# Patient Record
Sex: MALE | Race: AMERICAN INDIAN OR ALASKA NATIVE | URBAN - METROPOLITAN AREA
[De-identification: names, ages, dates, MRNs, and addresses within clinical notes are randomized per-mention and may not be internally consistent; named-entity substitution may affect disease eponyms.]

---

## 2017-06-29 ENCOUNTER — EMERGENCY (EMERGENCY)
Facility: HOSPITAL | Age: 57
LOS: 0 days | Discharge: HOME | End: 2017-06-29

## 2017-06-29 DIAGNOSIS — E11.9 TYPE 2 DIABETES MELLITUS WITHOUT COMPLICATIONS: ICD-10-CM

## 2017-06-29 DIAGNOSIS — Z79.82 LONG TERM (CURRENT) USE OF ASPIRIN: ICD-10-CM

## 2017-06-29 DIAGNOSIS — M62.830 MUSCLE SPASM OF BACK: ICD-10-CM

## 2017-06-29 DIAGNOSIS — M54.41 LUMBAGO WITH SCIATICA, RIGHT SIDE: ICD-10-CM

## 2017-06-29 DIAGNOSIS — I11.0 HYPERTENSIVE HEART DISEASE WITH HEART FAILURE: ICD-10-CM

## 2017-06-29 DIAGNOSIS — Z95.5 PRESENCE OF CORONARY ANGIOPLASTY IMPLANT AND GRAFT: ICD-10-CM

## 2017-06-29 DIAGNOSIS — I51.9 HEART DISEASE, UNSPECIFIED: ICD-10-CM

## 2017-11-16 ENCOUNTER — OUTPATIENT (OUTPATIENT)
Dept: OUTPATIENT SERVICES | Facility: HOSPITAL | Age: 57
LOS: 1 days | Discharge: HOME | End: 2017-11-16

## 2017-11-16 DIAGNOSIS — I25.10 ATHEROSCLEROTIC HEART DISEASE OF NATIVE CORONARY ARTERY WITHOUT ANGINA PECTORIS: ICD-10-CM

## 2017-11-16 DIAGNOSIS — E03.9 HYPOTHYROIDISM, UNSPECIFIED: ICD-10-CM

## 2017-11-16 DIAGNOSIS — Z01.810 ENCOUNTER FOR PREPROCEDURAL CARDIOVASCULAR EXAMINATION: ICD-10-CM

## 2017-11-16 DIAGNOSIS — E78.00 PURE HYPERCHOLESTEROLEMIA, UNSPECIFIED: ICD-10-CM

## 2017-11-16 DIAGNOSIS — E11.9 TYPE 2 DIABETES MELLITUS WITHOUT COMPLICATIONS: ICD-10-CM

## 2020-02-21 ENCOUNTER — APPOINTMENT (OUTPATIENT)
Dept: CARDIOLOGY | Facility: CLINIC | Age: 60
End: 2020-02-21
Payer: COMMERCIAL

## 2020-02-21 PROCEDURE — 93000 ELECTROCARDIOGRAM COMPLETE: CPT

## 2020-02-21 PROCEDURE — 99214 OFFICE O/P EST MOD 30 MIN: CPT

## 2020-02-24 ENCOUNTER — APPOINTMENT (OUTPATIENT)
Dept: CARDIOLOGY | Facility: CLINIC | Age: 60
End: 2020-02-24

## 2020-03-04 ENCOUNTER — OUTPATIENT (OUTPATIENT)
Dept: OUTPATIENT SERVICES | Facility: HOSPITAL | Age: 60
LOS: 1 days | Discharge: HOME | End: 2020-03-04
Payer: COMMERCIAL

## 2020-03-04 DIAGNOSIS — R07.9 CHEST PAIN, UNSPECIFIED: ICD-10-CM

## 2020-03-04 PROCEDURE — 78452 HT MUSCLE IMAGE SPECT MULT: CPT | Mod: 26

## 2020-03-10 ENCOUNTER — APPOINTMENT (OUTPATIENT)
Dept: CARDIOLOGY | Facility: CLINIC | Age: 60
End: 2020-03-10
Payer: COMMERCIAL

## 2020-03-10 PROCEDURE — 99214 OFFICE O/P EST MOD 30 MIN: CPT

## 2020-03-10 PROCEDURE — 93000 ELECTROCARDIOGRAM COMPLETE: CPT

## 2020-03-11 ENCOUNTER — APPOINTMENT (OUTPATIENT)
Dept: CARDIOLOGY | Facility: CLINIC | Age: 60
End: 2020-03-11
Payer: COMMERCIAL

## 2020-03-11 PROCEDURE — 93306 TTE W/DOPPLER COMPLETE: CPT

## 2020-03-13 ENCOUNTER — APPOINTMENT (OUTPATIENT)
Dept: CARDIOLOGY | Facility: CLINIC | Age: 60
End: 2020-03-13

## 2020-03-16 ENCOUNTER — INPATIENT (INPATIENT)
Facility: HOSPITAL | Age: 60
LOS: 6 days | Discharge: HOME HEALTH | End: 2020-03-23
Attending: THORACIC SURGERY (CARDIOTHORACIC VASCULAR SURGERY) | Admitting: THORACIC SURGERY (CARDIOTHORACIC VASCULAR SURGERY)
Payer: COMMERCIAL

## 2020-03-16 ENCOUNTER — APPOINTMENT (OUTPATIENT)
Dept: CARDIOLOGY | Facility: CLINIC | Age: 60
End: 2020-03-16

## 2020-03-16 VITALS
OXYGEN SATURATION: 98 % | TEMPERATURE: 97 F | RESPIRATION RATE: 20 BRPM | HEIGHT: 70 IN | WEIGHT: 182.1 LBS | DIASTOLIC BLOOD PRESSURE: 88 MMHG | SYSTOLIC BLOOD PRESSURE: 150 MMHG | HEART RATE: 79 BPM
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
## 2020-03-16 DIAGNOSIS — I25.2 OLD MYOCARDIAL INFARCTION: ICD-10-CM

## 2020-03-16 DIAGNOSIS — Z87.891 PERSONAL HISTORY OF NICOTINE DEPENDENCE: ICD-10-CM

## 2020-03-16 DIAGNOSIS — Z79.02 LONG TERM (CURRENT) USE OF ANTITHROMBOTICS/ANTIPLATELETS: ICD-10-CM

## 2020-03-16 DIAGNOSIS — Z79.82 LONG TERM (CURRENT) USE OF ASPIRIN: ICD-10-CM

## 2020-03-16 DIAGNOSIS — D62 ACUTE POSTHEMORRHAGIC ANEMIA: ICD-10-CM

## 2020-03-16 DIAGNOSIS — I66.21 OCCLUSION AND STENOSIS OF RIGHT POSTERIOR CEREBRAL ARTERY: ICD-10-CM

## 2020-03-16 DIAGNOSIS — I25.82 CHRONIC TOTAL OCCLUSION OF CORONARY ARTERY: ICD-10-CM

## 2020-03-16 DIAGNOSIS — I95.81 POSTPROCEDURAL HYPOTENSION: ICD-10-CM
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
## 2020-03-16 DIAGNOSIS — G45.9 TRANSIENT CEREBRAL ISCHEMIC ATTACK, UNSPECIFIED: ICD-10-CM

## 2020-03-16 DIAGNOSIS — R29.705 NIHSS SCORE 5: ICD-10-CM

## 2020-03-16 DIAGNOSIS — G89.12 ACUTE POST-THORACOTOMY PAIN: ICD-10-CM
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
## 2020-03-16 DIAGNOSIS — Z90.89 ACQUIRED ABSENCE OF OTHER ORGANS: Chronic | ICD-10-CM

## 2020-03-16 DIAGNOSIS — R94.39 ABNORMAL RESULT OF OTHER CARDIOVASCULAR FUNCTION STUDY: ICD-10-CM
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
## 2020-03-16 DIAGNOSIS — I66.01 OCCLUSION AND STENOSIS OF RIGHT MIDDLE CEREBRAL ARTERY: ICD-10-CM

## 2020-03-16 DIAGNOSIS — I25.110 ATHEROSCLEROTIC HEART DISEASE OF NATIVE CORONARY ARTERY WITH UNSTABLE ANGINA PECTORIS: ICD-10-CM

## 2020-03-16 DIAGNOSIS — E11.65 TYPE 2 DIABETES MELLITUS WITH HYPERGLYCEMIA: ICD-10-CM

## 2020-03-16 DIAGNOSIS — Z79.84 LONG TERM (CURRENT) USE OF ORAL HYPOGLYCEMIC DRUGS: ICD-10-CM

## 2020-03-16 DIAGNOSIS — I10 ESSENTIAL (PRIMARY) HYPERTENSION: ICD-10-CM

## 2020-03-16 DIAGNOSIS — H53.461 HOMONYMOUS BILATERAL FIELD DEFECTS, RIGHT SIDE: ICD-10-CM

## 2020-03-16 DIAGNOSIS — E87.70 FLUID OVERLOAD, UNSPECIFIED: ICD-10-CM

## 2020-03-16 DIAGNOSIS — E78.5 HYPERLIPIDEMIA, UNSPECIFIED: ICD-10-CM

## 2020-03-16 DIAGNOSIS — T82.855A STENOSIS OF CORONARY ARTERY STENT, INITIAL ENCOUNTER: ICD-10-CM

## 2020-03-16 DIAGNOSIS — Z98.890 OTHER SPECIFIED POSTPROCEDURAL STATES: Chronic | ICD-10-CM

## 2020-03-16 LAB
ALBUMIN SERPL ELPH-MCNC: 4.3 G/DL — SIGNIFICANT CHANGE UP (ref 3.5–5.2)
ALP SERPL-CCNC: 83 U/L — SIGNIFICANT CHANGE UP (ref 30–115)
ALT FLD-CCNC: 25 U/L — SIGNIFICANT CHANGE UP (ref 0–41)
ANION GAP SERPL CALC-SCNC: 10 MMOL/L — SIGNIFICANT CHANGE UP (ref 7–14)
ANION GAP SERPL CALC-SCNC: 13 MMOL/L — SIGNIFICANT CHANGE UP (ref 7–14)
APPEARANCE UR: CLEAR — SIGNIFICANT CHANGE UP
APTT BLD: 30.2 SEC — SIGNIFICANT CHANGE UP (ref 27–39.2)
APTT BLD: 61.6 SEC — HIGH (ref 27–39.2)
AST SERPL-CCNC: 19 U/L — SIGNIFICANT CHANGE UP (ref 0–41)
BASOPHILS # BLD AUTO: 0.06 K/UL — SIGNIFICANT CHANGE UP (ref 0–0.2)
BASOPHILS NFR BLD AUTO: 1 % — SIGNIFICANT CHANGE UP (ref 0–1)
BILIRUB SERPL-MCNC: 1.4 MG/DL — HIGH (ref 0.2–1.2)
BILIRUB UR-MCNC: NEGATIVE — SIGNIFICANT CHANGE UP
BUN SERPL-MCNC: 10 MG/DL — SIGNIFICANT CHANGE UP (ref 10–20)
BUN SERPL-MCNC: 11 MG/DL — SIGNIFICANT CHANGE UP (ref 10–20)
CALCIUM SERPL-MCNC: 9.4 MG/DL — SIGNIFICANT CHANGE UP (ref 8.5–10.1)
CALCIUM SERPL-MCNC: 9.8 MG/DL — SIGNIFICANT CHANGE UP (ref 8.5–10.1)
CHLORIDE SERPL-SCNC: 100 MMOL/L — SIGNIFICANT CHANGE UP (ref 98–110)
CHLORIDE SERPL-SCNC: 100 MMOL/L — SIGNIFICANT CHANGE UP (ref 98–110)
CHOLEST SERPL-MCNC: 153 MG/DL — SIGNIFICANT CHANGE UP (ref 100–200)
CO2 SERPL-SCNC: 26 MMOL/L — SIGNIFICANT CHANGE UP (ref 17–32)
CO2 SERPL-SCNC: 27 MMOL/L — SIGNIFICANT CHANGE UP (ref 17–32)
COLOR SPEC: SIGNIFICANT CHANGE UP
CREAT SERPL-MCNC: 0.8 MG/DL — SIGNIFICANT CHANGE UP (ref 0.7–1.5)
CREAT SERPL-MCNC: 0.8 MG/DL — SIGNIFICANT CHANGE UP (ref 0.7–1.5)
DIFF PNL FLD: NEGATIVE — SIGNIFICANT CHANGE UP
EOSINOPHIL # BLD AUTO: 0.13 K/UL — SIGNIFICANT CHANGE UP (ref 0–0.7)
EOSINOPHIL NFR BLD AUTO: 2.1 % — SIGNIFICANT CHANGE UP (ref 0–8)
ESTIMATED AVERAGE GLUCOSE: 214 MG/DL — HIGH (ref 68–114)
GLUCOSE BLDC GLUCOMTR-MCNC: 224 MG/DL — HIGH (ref 70–99)
GLUCOSE BLDC GLUCOMTR-MCNC: 237 MG/DL — HIGH (ref 70–99)
GLUCOSE SERPL-MCNC: 248 MG/DL — HIGH (ref 70–99)
GLUCOSE SERPL-MCNC: 257 MG/DL — HIGH (ref 70–99)
GLUCOSE UR QL: ABNORMAL
HBA1C BLD-MCNC: 9.1 % — HIGH (ref 4–5.6)
HCT VFR BLD CALC: 40.6 % — LOW (ref 42–52)
HCT VFR BLD CALC: 43.3 % — SIGNIFICANT CHANGE UP (ref 42–52)
HDLC SERPL-MCNC: 39 MG/DL — LOW
HGB BLD-MCNC: 14.4 G/DL — SIGNIFICANT CHANGE UP (ref 14–18)
HGB BLD-MCNC: 15.4 G/DL — SIGNIFICANT CHANGE UP (ref 14–18)
IMM GRANULOCYTES NFR BLD AUTO: 0.5 % — HIGH (ref 0.1–0.3)
INR BLD: 0.91 RATIO — SIGNIFICANT CHANGE UP (ref 0.65–1.3)
KETONES UR-MCNC: NEGATIVE — SIGNIFICANT CHANGE UP
LEUKOCYTE ESTERASE UR-ACNC: NEGATIVE — SIGNIFICANT CHANGE UP
LIPID PNL WITH DIRECT LDL SERPL: 85 MG/DL — SIGNIFICANT CHANGE UP (ref 4–129)
LYMPHOCYTES # BLD AUTO: 2.61 K/UL — SIGNIFICANT CHANGE UP (ref 1.2–3.4)
LYMPHOCYTES # BLD AUTO: 41.8 % — SIGNIFICANT CHANGE UP (ref 20.5–51.1)
MCHC RBC-ENTMCNC: 31.1 PG — HIGH (ref 27–31)
MCHC RBC-ENTMCNC: 31.2 PG — HIGH (ref 27–31)
MCHC RBC-ENTMCNC: 35.5 G/DL — SIGNIFICANT CHANGE UP (ref 32–37)
MCHC RBC-ENTMCNC: 35.6 G/DL — SIGNIFICANT CHANGE UP (ref 32–37)
MCV RBC AUTO: 87.5 FL — SIGNIFICANT CHANGE UP (ref 80–94)
MCV RBC AUTO: 87.9 FL — SIGNIFICANT CHANGE UP (ref 80–94)
MONOCYTES # BLD AUTO: 0.42 K/UL — SIGNIFICANT CHANGE UP (ref 0.1–0.6)
MONOCYTES NFR BLD AUTO: 6.7 % — SIGNIFICANT CHANGE UP (ref 1.7–9.3)
MRSA PCR RESULT.: NEGATIVE — SIGNIFICANT CHANGE UP
NEUTROPHILS # BLD AUTO: 2.99 K/UL — SIGNIFICANT CHANGE UP (ref 1.4–6.5)
NEUTROPHILS NFR BLD AUTO: 47.9 % — SIGNIFICANT CHANGE UP (ref 42.2–75.2)
NITRITE UR-MCNC: NEGATIVE — SIGNIFICANT CHANGE UP
NRBC # BLD: 0 /100 WBCS — SIGNIFICANT CHANGE UP (ref 0–0)
NRBC # BLD: 0 /100 WBCS — SIGNIFICANT CHANGE UP (ref 0–0)
NT-PROBNP SERPL-SCNC: 20 PG/ML — SIGNIFICANT CHANGE UP (ref 0–300)
PH UR: 6.5 — SIGNIFICANT CHANGE UP (ref 5–8)
PLATELET # BLD AUTO: 182 K/UL — SIGNIFICANT CHANGE UP (ref 130–400)
PLATELET # BLD AUTO: 193 K/UL — SIGNIFICANT CHANGE UP (ref 130–400)
POTASSIUM SERPL-MCNC: 4.3 MMOL/L — SIGNIFICANT CHANGE UP (ref 3.5–5)
POTASSIUM SERPL-MCNC: 4.4 MMOL/L — SIGNIFICANT CHANGE UP (ref 3.5–5)
POTASSIUM SERPL-SCNC: 4.3 MMOL/L — SIGNIFICANT CHANGE UP (ref 3.5–5)
POTASSIUM SERPL-SCNC: 4.4 MMOL/L — SIGNIFICANT CHANGE UP (ref 3.5–5)
PROT SERPL-MCNC: 6.4 G/DL — SIGNIFICANT CHANGE UP (ref 6–8)
PROT UR-MCNC: NEGATIVE — SIGNIFICANT CHANGE UP
PROTHROM AB SERPL-ACNC: 10.5 SEC — SIGNIFICANT CHANGE UP (ref 9.95–12.87)
RBC # BLD: 4.62 M/UL — LOW (ref 4.7–6.1)
RBC # BLD: 4.95 M/UL — SIGNIFICANT CHANGE UP (ref 4.7–6.1)
RBC # FLD: 12 % — SIGNIFICANT CHANGE UP (ref 11.5–14.5)
RBC # FLD: 12 % — SIGNIFICANT CHANGE UP (ref 11.5–14.5)
SODIUM SERPL-SCNC: 136 MMOL/L — SIGNIFICANT CHANGE UP (ref 135–146)
SODIUM SERPL-SCNC: 140 MMOL/L — SIGNIFICANT CHANGE UP (ref 135–146)
SP GR SPEC: 1.01 — SIGNIFICANT CHANGE UP (ref 1.01–1.02)
TOTAL CHOLESTEROL/HDL RATIO MEASUREMENT: 3.9 RATIO — LOW (ref 4–5.5)
TRIGL SERPL-MCNC: 333 MG/DL — HIGH (ref 10–149)
UROBILINOGEN FLD QL: SIGNIFICANT CHANGE UP
WBC # BLD: 6.24 K/UL — SIGNIFICANT CHANGE UP (ref 4.8–10.8)
WBC # BLD: 8.32 K/UL — SIGNIFICANT CHANGE UP (ref 4.8–10.8)
WBC # FLD AUTO: 6.24 K/UL — SIGNIFICANT CHANGE UP (ref 4.8–10.8)
WBC # FLD AUTO: 8.32 K/UL — SIGNIFICANT CHANGE UP (ref 4.8–10.8)

## 2020-03-16 PROCEDURE — 71045 X-RAY EXAM CHEST 1 VIEW: CPT | Mod: 26

## 2020-03-16 PROCEDURE — 99222 1ST HOSP IP/OBS MODERATE 55: CPT

## 2020-03-16 PROCEDURE — 71250 CT THORAX DX C-: CPT | Mod: 26

## 2020-03-16 PROCEDURE — 93306 TTE W/DOPPLER COMPLETE: CPT | Mod: 26

## 2020-03-16 PROCEDURE — 93454 CORONARY ARTERY ANGIO S&I: CPT | Mod: 26

## 2020-03-16 PROCEDURE — 93880 EXTRACRANIAL BILAT STUDY: CPT | Mod: 26

## 2020-03-16 RX ORDER — INSULIN LISPRO 100/ML
VIAL (ML) SUBCUTANEOUS
Refills: 0 | Status: DISCONTINUED | OUTPATIENT
Start: 2020-03-16 | End: 2020-03-19

## 2020-03-16 RX ORDER — METFORMIN HYDROCHLORIDE 850 MG/1
1 TABLET ORAL
Qty: 0 | Refills: 0 | DISCHARGE

## 2020-03-16 RX ORDER — ATORVASTATIN CALCIUM 80 MG/1
40 TABLET, FILM COATED ORAL AT BEDTIME
Refills: 0 | Status: DISCONTINUED | OUTPATIENT
Start: 2020-03-16 | End: 2020-03-23

## 2020-03-16 RX ORDER — TEMAZEPAM 15 MG/1
15 CAPSULE ORAL ONCE
Refills: 0 | Status: DISCONTINUED | OUTPATIENT
Start: 2020-03-16 | End: 2020-03-17

## 2020-03-16 RX ORDER — DEXTROSE 50 % IN WATER 50 %
12.5 SYRINGE (ML) INTRAVENOUS ONCE
Refills: 0 | Status: DISCONTINUED | OUTPATIENT
Start: 2020-03-16 | End: 2020-03-19

## 2020-03-16 RX ORDER — SODIUM CHLORIDE 9 MG/ML
1000 INJECTION INTRAMUSCULAR; INTRAVENOUS; SUBCUTANEOUS
Refills: 0 | Status: DISCONTINUED | OUTPATIENT
Start: 2020-03-16 | End: 2020-03-17

## 2020-03-16 RX ORDER — GLUCAGON INJECTION, SOLUTION 0.5 MG/.1ML
1 INJECTION, SOLUTION SUBCUTANEOUS ONCE
Refills: 0 | Status: DISCONTINUED | OUTPATIENT
Start: 2020-03-16 | End: 2020-03-23

## 2020-03-16 RX ORDER — INFLUENZA VIRUS VACCINE 15; 15; 15; 15 UG/.5ML; UG/.5ML; UG/.5ML; UG/.5ML
0.5 SUSPENSION INTRAMUSCULAR ONCE
Refills: 0 | Status: COMPLETED | OUTPATIENT
Start: 2020-03-16 | End: 2020-03-23

## 2020-03-16 RX ORDER — DEXTROSE 50 % IN WATER 50 %
25 SYRINGE (ML) INTRAVENOUS ONCE
Refills: 0 | Status: DISCONTINUED | OUTPATIENT
Start: 2020-03-16 | End: 2020-03-19

## 2020-03-16 RX ORDER — HEPARIN SODIUM 5000 [USP'U]/ML
850 INJECTION INTRAVENOUS; SUBCUTANEOUS
Qty: 25000 | Refills: 0 | Status: DISCONTINUED | OUTPATIENT
Start: 2020-03-16 | End: 2020-03-17

## 2020-03-16 RX ORDER — CHLORHEXIDINE GLUCONATE 213 G/1000ML
1 SOLUTION TOPICAL ONCE
Refills: 0 | Status: COMPLETED | OUTPATIENT
Start: 2020-03-16 | End: 2020-03-16

## 2020-03-16 RX ORDER — CHLORHEXIDINE GLUCONATE 213 G/1000ML
15 SOLUTION TOPICAL ONCE
Refills: 0 | Status: DISCONTINUED | OUTPATIENT
Start: 2020-03-16 | End: 2020-03-17

## 2020-03-16 RX ORDER — METOPROLOL TARTRATE 50 MG
25 TABLET ORAL ONCE
Refills: 0 | Status: COMPLETED | OUTPATIENT
Start: 2020-03-16 | End: 2020-03-16

## 2020-03-16 RX ORDER — SODIUM CHLORIDE 9 MG/ML
1000 INJECTION, SOLUTION INTRAVENOUS
Refills: 0 | Status: DISCONTINUED | OUTPATIENT
Start: 2020-03-16 | End: 2020-03-19

## 2020-03-16 RX ORDER — DEXTROSE 50 % IN WATER 50 %
15 SYRINGE (ML) INTRAVENOUS ONCE
Refills: 0 | Status: DISCONTINUED | OUTPATIENT
Start: 2020-03-16 | End: 2020-03-19

## 2020-03-16 RX ORDER — INSULIN HUMAN 100 [IU]/ML
2 INJECTION, SOLUTION SUBCUTANEOUS
Qty: 100 | Refills: 0 | Status: DISCONTINUED | OUTPATIENT
Start: 2020-03-16 | End: 2020-03-19

## 2020-03-16 RX ADMIN — CHLORHEXIDINE GLUCONATE 1 APPLICATION(S): 213 SOLUTION TOPICAL at 20:30

## 2020-03-16 RX ADMIN — ATORVASTATIN CALCIUM 40 MILLIGRAM(S): 80 TABLET, FILM COATED ORAL at 22:13

## 2020-03-16 RX ADMIN — Medication 25 MILLIGRAM(S): at 22:13

## 2020-03-16 RX ADMIN — HEPARIN SODIUM 8.5 UNIT(S)/HR: 5000 INJECTION INTRAVENOUS; SUBCUTANEOUS at 19:05

## 2020-03-16 NOTE — CONSULT NOTE ADULT - ATTENDING COMMENTS
has crtical Cx , LAD and diag stenosis. Agree that CABG is the best option. explained risks of surgery to patient and family including bleeding, death, stroke, infection etc. He is agreeable. will admit and start heparin. echo, carotid duplex etc.

## 2020-03-16 NOTE — PRE-ANESTHESIA EVALUATION ADULT - NSRADCARDRESULTSFT_GEN_ALL_CORE
NM stress test  Impression:  1. Positive stress test for exercise induced ischemia with respect to symptoms at a moderate workload.  2. Negative stress test for exercise induced ischemia with respect to electrocardiographic changes at a moderate workload.  3. Myocardial imaging reveals moderate in size and severity inferolateral defect with near complete reversibility consistent with infarct and superimposed ischemia distribution mid to distal left circumflex coronary artery as described above  4. Gated imaging reveals mild hypokinesia and decreased thickening of the inferolateral wall with ejection fraction within normal limits  Recommendation:  Therapy.  Risk factor modification.  Compared to previous coronary arteriography as well as interventional reports.  If clinically indicated consider angiography    LEFT HEART CATHETERIZATION   3/16/20                               Left main: mil disease  LAD: 80% lesion in prox segment at the bifurcation with Diag  Dia % stenosis in ostium, large vessel  Left Circumflex: in stent total occlusion in OM , supplied distally by colalterals from left and right  Right Coronary Artery: mild disease  RI : severe atherosclerosis      POST-OP DIAGNOSIS  2 vessels CAD. NM stress test  Impression:  1. Positive stress test for exercise induced ischemia with respect to symptoms at a moderate workload.  2. Negative stress test for exercise induced ischemia with respect to electrocardiographic changes at a moderate workload.  3. Myocardial imaging reveals moderate in size and severity inferolateral defect with near complete reversibility consistent with infarct and superimposed ischemia distribution mid to distal left circumflex coronary artery as described above  4. Gated imaging reveals mild hypokinesia and decreased thickening of the inferolateral wall with ejection fraction within normal limits  Recommendation:  Therapy.  Risk factor modification.  Compared to previous coronary arteriography as well as interventional reports.  If clinically indicated consider angiography    LEFT HEART CATHETERIZATION   3/16/20                               Left main: mil disease  LAD: 80% lesion in prox segment at the bifurcation with Diag  Dia % stenosis in ostium, large vessel  Left Circumflex: in stent total occlusion in OM , supplied distally by colalterals from left and right  Right Coronary Artery: mild disease  RI : severe atherosclerosis      POST-OP DIAGNOSIS  2 vessels CAD.    Echo pending.

## 2020-03-16 NOTE — ASU PATIENT PROFILE, ADULT - PMH
CAD (coronary artery disease)  s/p PTCA WITH 2 STENTS  DM2 (diabetes mellitus, type 2)    H/O hyperlipidemia    H/O: HTN (hypertension)

## 2020-03-16 NOTE — CHART NOTE - NSCHARTNOTEFT_GEN_A_CORE
PRE-OP DIAGNOSIS: abnormal stress test    PROCEDURE: [facility] with coronary angiography    Physician: Dr Paez  Assistant: Valeria    ANESTHESIA TYPE:  [  ]General Anesthesia  [ x ] Sedation  [  ] Local/Regional    ESTIMATED BLOOD LOSS:    10   mL    CONDITION  [  ] Critical  [  ] Serious  [  ]Fair  [ x ]Good    IV CONTRAST: 40  mL    FINDINGS  Left Heart Catheterization:  LVEF%:  LVEDP:  [ ] Normal Coronary Arteries  [ ] Luminal Irregularities  [ ] Non-obstructive CAD    ACCESS:    [x ] right radial artery  [ ] right femoral artery    LEFT HEART CATHETERIZATION                                    Left main: mil disease  LAD: 80% lesion in prox segment at the bifurcation with Diag  Dia % stenosis in ostium  Left Circumflex: in stent total occlusion in distal circ , supplied distally by colalterals from left and right  Right Coronary Artery: mild disease  RI : severe atherosclerosis      POST-OP DIAGNOSIS  2 vessels CAD.        PLAN OF CARE  [ x] D/C Home today  revascularisation option will be discussed with the patient. PRE-OP DIAGNOSIS: abnormal stress test    PROCEDURE: [facility] with coronary angiography    Physician: Dr Paez  Assistant: Valeria    ANESTHESIA TYPE:  [  ]General Anesthesia  [ x ] Sedation  [  ] Local/Regional    ESTIMATED BLOOD LOSS:    10   mL    CONDITION  [  ] Critical  [  ] Serious  [  ]Fair  [ x ]Good    IV CONTRAST: 40  mL    FINDINGS  Left Heart Catheterization:  LVEF%:  LVEDP:  [ ] Normal Coronary Arteries  [ ] Luminal Irregularities  [ ] Non-obstructive CAD    ACCESS:    [x ] right radial artery  [ ] right femoral artery    LEFT HEART CATHETERIZATION                                    Left main: mil disease  LAD: 80% lesion in prox segment at the bifurcation with Diag  Dia % stenosis in ostium, large vessel  Left Circumflex: in stent total occlusion in OM , supplied distally by colalterals from left and right  Right Coronary Artery: mild disease  RI : severe atherosclerosis      POST-OP DIAGNOSIS  2 vessels CAD.        PLAN OF CARE  [ x] D/C Home today  CABG.

## 2020-03-16 NOTE — CONSULT NOTE ADULT - SUBJECTIVE AND OBJECTIVE BOX
Surgeon: /Gorge/ Keyur    Consult requesting by: Dr. Paez    Cardio: Dr Paez  PMD: Afshan RIVERA Sultan    HISTORY OF PRESENT ILLNESS:  59y Male with PMH of DM, HTN, CAD, MI 10 years ago, s/p PCI stent x 2, with chest pressure and SOB at night for last 3-4 months, had abnormal stress test, now presents for elective cardiac cath which revealed 2vCAD.   Pt had abnormal stress test.    NYHA functional class    [ ] Class I (no limitation) [ ] Class II (slight limitation) [ ] Class III (marked limitation) [ ] Class IV (symptoms at rest)    PAST MEDICAL & SURGICAL HISTORY:  CAD (coronary artery disease): s/p PTCA WITH 2 STENTS  H/O hyperlipidemia  H/O: HTN (hypertension)  DM2 (diabetes mellitus, type 2)  History of tonsillectomy  H/O knee surgery      MEDICATIONS  (STANDING):    MEDICATIONS  (PRN):    Antiplatelet therapy:                           Last dose/amt:    Allergies    No Known Allergies    Intolerances        SOCIAL HISTORY:  Smoker: [ ] Yes  [ ] No        PACK YEARS:                         WHEN QUIT?  ETOH use: [ ] Yes  [ ] No              FREQUENCY / QUANTITY:  Ilicit Drug use:  [ ] Yes  [ ] No  Occupation:  Lives with:  Assisted device use:  5 meter walk test: 1____sec, 2____sec, 3___sec  FAMILY HISTORY:      Review of Systems  CONSTITUTIONAL:  Fevers[ ] chills[ ] sweats[ ] fatigue[ ] weight loss[ ] weight gain [ ]                                     NEGATIVE [X ]   NEURO:  parathesias[ ] seizures [ ]  syncope [ ]  confusion [ ]                                                                                NEGATIVE[ X]   EYES: glasses[ ]  blurry vision[ ]  discharge[ ] pain[ ] glaucoma [ ]                                                                          NEGATIVE[X ]   ENMT:  difficulty hearing [ ]  vertigo[ ]  dysphagia[ ] epistaxis[ ] recent dental work [ ]                                    NEGATIVE[ X]   CV:  chest pain[ ] palpitations[ ] ASHLEY [ ] diaphoresis [ ]                                                                                           NEGATIVE[ X]   RESPIRATORY:  wheezing[ ] SOB[ ] cough [ ] sputum[ ] hemoptysis[ ]                                                                  NEGATIVE[ ]   GI:  nausea[ ]  vommiting [ ]  diarrhea[ ] constipation [ ] melena [ ]                                                                      NEGATIVE[ X]   : hematuria[ ]  dysuria[ ] urgency[ ] incontinence[ ]                                                                                            NEGATIVE[ X]   MUSKULOSKELETAL:  arthritis[ ]  joint swelling [ ] muscle weakness [ ] Hx vein stripping [ ]                             NEGATIVE[X ]   SKIN/BREAST:  rash[ ] itching [ ]  hair loss[ ] masses[ ]                                                                                              NEGATIVE[ X]   PSYCH:  dementia [ ] depresion [ ] anxiety[ ]                                                                                                               NEGATIVE[X ]   HEME/LYMPH:  bruises easily[ ] enlarged lymph nodes[ ] tender lymph nodes[ ]                                               NEGATIVE[ X]   ENDOCRINE:  cold intolerance[ ] heat intolerance[ ] polydipsia[ ]                                                                          NEGATIVE[ X]     PHYSICAL EXAM  Vital Signs Last 24 Hrs  T(C): --  T(F): --  HR: --  BP: --  BP(mean): --  RR: --  SpO2: --  Right arm bp: Left arm bp;    CONSTITUTIONAL:                                                                          WNL[ ]   Neuro: WNL[ ] Normal exam oriented to person/place & time with no focal motor or sensory  deficits. Other                     Eyes: WNL[ ]   Normal exam of conjunctiva & lids, pupils equally reactive. Other     ENT: WNL[ ]    Normal exam of nasal/oral mucosa with absence of cyanosis. Other  Neck: WNL[ ]  Normal exam of jugular veins, trachea & thyroid. Other  Chest: WNL[ ] Normal lung exam with good air movement absence of wheezes, rales, or rhonchi: Other                                                                                CV:  Auscultation: normal [ ] S3[ ] S4[ ] Irregular [ ] Rub[ ] Clicks[ ]    Murmurs none:[ ]systolic [ ]  diastolic [ ] holosystolic [ ]  Carotids: No Bruits[ ] Other                 Abdominal Aorta: normal [ ] nonpalpable[ ]Other                                                                                      GI:           WNL[ ] Normal exam of abdomen, liver & spleen with no noted masses or tenderness. Other                                                                                                        Extremities: WNL[ ] Normal no evidence of cyanosis or deformity Edema: none[ ]trace[ ]1+[ ]2+[ ]3+[ ]4+[ ]  Lower Extremity Pulses: Right[ ] Left[ ]Varicosities[ ]  SKIN :WNL[ ] Normal exam to inspection & palation. Other:                                                          LABS:                        15.4   8.32  )-----------( 193      ( 16 Mar 2020 10:21 )             43.3     03-16    140  |  100  |  10  ----------------------------<  248<H>  4.4   |  27  |  0.8    Ca    9.8      16 Mar 2020 10:21                  Cardiac Cath:    TTE / MARANDA:    Recommendation: (Procedures/Evaluations)  CT HEAD Nonn-Contrast:[  ]  CT Chest with /without contrast [ ]  Carotid Duplex :[ ]  DYLAN/PVR: [ ]  PFT : Simple PFT [ ]  Full [ ]  Renal Consult [ ]  Pulmonary Consult: [ ]   Vascular Consult [ ]    Dental Consult [ ]   Hem-Onc Consult [ ]   GI Consult [ ]   Other Consultations :    STS Score:     Impression:    CAD [ ]  Valvular  disease [ ]   Aortic Disease [ ]   DWAYNE: Yes[ ] No [ ]   CKD Stage I [ ] , Stage II [ ] , Stage III [ ], Stage IV [ ]   Anemia: Yes [ ], No [ ]  Diabetes :Yes [ ], No [ ]  Acute MI: Yes [ ], No [ ]   Heart Failure: Yes [ ] , No [ ] HFpEF [ ], HFrEF [ ]        Assessment/ Plan: Surgeon: /Gorge/ Keyur    Consult requesting by: Dr. Paez    Cardio: Dr Paez  PMD: Afshan RIVERA Sultan    HISTORY OF PRESENT ILLNESS:  59y Male with PMH of DM, HTN, CAD, MI 10 years ago, s/p PCI stent x 2, with chest pressure and SOB at night for last 3-4 months, had abnormal stress test, now presents for elective cardiac cath which revealed 2vCAD.       NYHA functional class    [ ] Class I (no limitation) [ ] Class II (slight limitation) [ ] Class III (marked limitation) [ ] Class IV (symptoms at rest)    PAST MEDICAL & SURGICAL HISTORY:  CAD (coronary artery disease): s/p PTCA WITH 2 STENTS  H/O hyperlipidemia  H/O: HTN (hypertension)  DM2 (diabetes mellitus, type 2)  History of tonsillectomy  H/O knee surgery    Home Medications:  aspirin 81 mg oral tablet: 1 tab(s) orally once a day (16 Mar 2020 10:33)  Lipitor 40 mg oral tablet: 1 tab(s) orally once a day (16 Mar 2020 10:33)  metFORMIN 500 mg oral tablet: 1 tab(s) orally 2 times a day (16 Mar 2020 10:33)  metoprolol succinate 25 mg oral tablet, extended release: 1 tab(s) orally once a day (16 Mar 2020 10:33)  Plavix 75 mg oral tablet: 1 tab(s) orally once a day (16 Mar 2020 10:33)    MEDICATIONS  (STANDING):    MEDICATIONS  (PRN):    Antiplatelet therapy:    plavix                       Last dose/amt: 75mg 3/15/2020    Allergies    No Known Allergies    Intolerances        SOCIAL HISTORY:  Smoker: [ ] Yes  [x ] No        PACK YEARS:                         WHEN QUIT?  ETOH use: [ ] Yes  [ x] No              FREQUENCY / QUANTITY:  Ilicit Drug use:  [ ] Yes  [ x] No  Occupation: business  Lives with: wife and kids  Assisted device use: none  5 meter walk test: 1____sec, 2____sec, 3___sec, just cath  FAMILY HISTORY:  No known family hx of CAD    Review of Systems  CONSTITUTIONAL:  Fevers[ ] chills[ ] sweats[ ] fatigue[ ] weight loss[ ] weight gain [ ]                                     NEGATIVE [X ]   NEURO:  parathesias[ ] seizures [ ]  syncope [ ]  confusion [ ]                                                                                NEGATIVE[ X]   EYES: glasses[ ]  blurry vision[ ]  discharge[ ] pain[ ] glaucoma [ ]                                                                          NEGATIVE[X ]   ENMT:  difficulty hearing [ ]  vertigo[ ]  dysphagia[ ] epistaxis[ ] recent dental work [ ]                                    NEGATIVE[ X]   CV:  chest pain[ ] palpitations[ ] ASHLEY [ ] diaphoresis [ ]                                                                                           NEGATIVE[ X]   RESPIRATORY:  wheezing[ ] SOB[ ] cough [ ] sputum[ ] hemoptysis[ ]                                                                  NEGATIVE[ ]   GI:  nausea[ ]  vommiting [ ]  diarrhea[ ] constipation [ ] melena [ ]                                                                      NEGATIVE[ X]   : hematuria[ ]  dysuria[ ] urgency[ ] incontinence[ ]                                                                                            NEGATIVE[ X]   MUSKULOSKELETAL:  arthritis[ ]  joint swelling [ ] muscle weakness [ ] Hx vein stripping [ ]                             NEGATIVE[X ]   SKIN/BREAST:  rash[ ] itching [ ]  hair loss[ ] masses[ ]                                                                                              NEGATIVE[ X]   PSYCH:  dementia [ ] depresion [ ] anxiety[ ]                                                                                                               NEGATIVE[X ]   HEME/LYMPH:  bruises easily[ ] enlarged lymph nodes[ ] tender lymph nodes[ ]                                               NEGATIVE[ X]   ENDOCRINE:  cold intolerance[ ] heat intolerance[ ] polydipsia[ ]                                                                          NEGATIVE[ X]     PHYSICAL EXAM  Vital Signs Last 24 Hrs  T(C): --  T(F): --  HR: --  BP: --  BP(mean): --  RR: --  SpO2: --  Right arm bp: Left arm bp;    CONSTITUTIONAL:                                                                          WNL[ x]   Neuro: WNL[x ] Normal exam oriented to person/place & time with no focal motor or sensory  deficits. Other                     Eyes: WNL[x ]   Normal exam of conjunctiva & lids, pupils equally reactive. Other     ENT: WNL[ x]    Normal exam of nasal/oral mucosa with absence of cyanosis. Other  Neck: WNL[ x]  Normal exam of jugular veins, trachea & thyroid. Other  Chest: WNL[ x] Normal lung exam with good air movement absence of wheezes, rales, or rhonchi: Other                                                                                CV:  Auscultation: normal [x ] S3[ ] S4[ ] Irregular [ ] Rub[ ] Clicks[ ]    Murmurs none:[ x]systolic [ ]  diastolic [ ] holosystolic [ ]  Carotids: No Bruits[ x] Other                 Abdominal Aorta: normal [ ] nonpalpable[ ]Other                                                                                      GI:           WNL[x ] Normal exam of abdomen, liver & spleen with no noted masses or tenderness. Other                                                                                                        Extremities: WNL[ x] Normal no evidence of cyanosis or deformity Edema: none[ ]trace[ ]1+[ ]2+[ ]3+[ ]4+[ ]  Lower Extremity Pulses: Right[ 2+] Left[ 2+]Varicosities[ ]  SKIN :WNL[ x] Normal exam to inspection & palation. Other:                                                          LABS:                        15.4   8.32  )-----------( 193      ( 16 Mar 2020 10:21 )             43.3     03-16    140  |  100  |  10  ----------------------------<  248<H>  4.4   |  27  |  0.8    Ca    9.8      16 Mar 2020 10:21      Cardiac Cath: Left main: mil disease  LAD: 80% lesion in prox segment at the bifurcation with Diag  Dia % stenosis in ostium, large vessel  Left Circumflex: in stent total occlusion in OM , supplied distally by colalterals from left and right  Right Coronary Artery: mild disease  RI : severe atherosclerosis      TTE / MARANDA:    Recommendation: (Procedures/Evaluations)  CT HEAD Nonn-Contrast:[  ]  CT Chest without contrast [ x]  Carotid Duplex :[ x]  DYLAN/PVR: [ ]  PFT : Simple PFT [x ]  Full [ ]  Renal Consult [ ]  Pulmonary Consult: [ ]   Vascular Consult [ ]    Dental Consult [ ]   Hem-Onc Consult [ ]   GI Consult [ ]   Other Consultations :    STS Score: Risk of Mortality:  0.290%  Renal Failure:  0.331%  Permanent Stroke:  0.583%  Prolonged Ventilation:  2.181%  DSW Infection:  0.081%  Reoperation:  3.069%  Morbidity or Mortality:  3.963%  Short Length of Stay:  73.798%  Long Length of Stay:  0.893%      Impression:    CAD [x ]  Valvular  disease [ ]   Aortic Disease [ ]   DWAYNE: Yes[ ] No [ ]   CKD Stage I [ ] , Stage II [ ] , Stage III [ ], Stage IV [ ]   Anemia: Yes [ ], No [ ]  Diabetes :Yes [ ], No [ ]  Acute MI: Yes [ ], No [ ]   Heart Failure: Yes [ ] , No [ ] HFpEF [ ], HFrEF [ ]        Assessment/ Plan:  59 year-old male with PMH as above presented for elective cath after having abnormal stress test, has 2vCAD  -pre-op for cabg  -will check verify now being patient was on plavix  -pre-op work up in progress

## 2020-03-16 NOTE — PRE-ANESTHESIA EVALUATION ADULT - NSANTHPMHFT_GEN_ALL_CORE
59y Male with PMH of DM, HTN, CAD, MI 10 years ago, s/p PCI stent x 2, with chest pressure and SOB at night for last 3-4 months, had abnormal stress test, now presents for elective cardiac cath which revealed 2vCAD.

## 2020-03-16 NOTE — H&P CARDIOLOGY - HISTORY OF PRESENT ILLNESS
59 yrs old with Hx of CAD , DM , DL , HTN is here for elective lHC.  pt had abnormal stress test. 59 yrs old with Hx of CAD, D , DL, HTN is here for elective LHC. Patient c/o progressive chest pain on exertion.  Pt had abnormal stress test.

## 2020-03-17 LAB
A1C WITH ESTIMATED AVERAGE GLUCOSE RESULT: 9.1 % — HIGH (ref 4–5.6)
ABO RH CONFIRMATION: SIGNIFICANT CHANGE UP
ALBUMIN SERPL ELPH-MCNC: 4.4 G/DL — SIGNIFICANT CHANGE UP (ref 3.5–5.2)
ALP SERPL-CCNC: 80 U/L — SIGNIFICANT CHANGE UP (ref 30–115)
ALT FLD-CCNC: 26 U/L — SIGNIFICANT CHANGE UP (ref 0–41)
ANION GAP SERPL CALC-SCNC: 13 MMOL/L — SIGNIFICANT CHANGE UP (ref 7–14)
APTT BLD: 44.8 SEC — HIGH (ref 27–39.2)
APTT BLD: 48.6 SEC — HIGH (ref 27–39.2)
APTT BLD: 55.1 SEC — HIGH (ref 27–39.2)
AST SERPL-CCNC: 25 U/L — SIGNIFICANT CHANGE UP (ref 0–41)
BILIRUB SERPL-MCNC: 1.3 MG/DL — HIGH (ref 0.2–1.2)
BLD GP AB SCN SERPL QL: SIGNIFICANT CHANGE UP
BUN SERPL-MCNC: 12 MG/DL — SIGNIFICANT CHANGE UP (ref 10–20)
CALCIUM SERPL-MCNC: 9.1 MG/DL — SIGNIFICANT CHANGE UP (ref 8.5–10.1)
CHLORIDE SERPL-SCNC: 104 MMOL/L — SIGNIFICANT CHANGE UP (ref 98–110)
CO2 SERPL-SCNC: 25 MMOL/L — SIGNIFICANT CHANGE UP (ref 17–32)
CREAT SERPL-MCNC: 0.7 MG/DL — SIGNIFICANT CHANGE UP (ref 0.7–1.5)
ESTIMATED AVERAGE GLUCOSE: 214 MG/DL — HIGH (ref 68–114)
GLUCOSE BLDC GLUCOMTR-MCNC: 108 MG/DL — HIGH (ref 70–99)
GLUCOSE BLDC GLUCOMTR-MCNC: 113 MG/DL — HIGH (ref 70–99)
GLUCOSE BLDC GLUCOMTR-MCNC: 114 MG/DL — HIGH (ref 70–99)
GLUCOSE BLDC GLUCOMTR-MCNC: 165 MG/DL — HIGH (ref 70–99)
GLUCOSE BLDC GLUCOMTR-MCNC: 165 MG/DL — HIGH (ref 70–99)
GLUCOSE BLDC GLUCOMTR-MCNC: 167 MG/DL — HIGH (ref 70–99)
GLUCOSE BLDC GLUCOMTR-MCNC: 169 MG/DL — HIGH (ref 70–99)
GLUCOSE BLDC GLUCOMTR-MCNC: 170 MG/DL — HIGH (ref 70–99)
GLUCOSE BLDC GLUCOMTR-MCNC: 172 MG/DL — HIGH (ref 70–99)
GLUCOSE BLDC GLUCOMTR-MCNC: 185 MG/DL — HIGH (ref 70–99)
GLUCOSE BLDC GLUCOMTR-MCNC: 198 MG/DL — HIGH (ref 70–99)
GLUCOSE BLDC GLUCOMTR-MCNC: 211 MG/DL — HIGH (ref 70–99)
GLUCOSE BLDC GLUCOMTR-MCNC: 225 MG/DL — HIGH (ref 70–99)
GLUCOSE BLDC GLUCOMTR-MCNC: 230 MG/DL — HIGH (ref 70–99)
GLUCOSE BLDC GLUCOMTR-MCNC: 234 MG/DL — HIGH (ref 70–99)
GLUCOSE BLDC GLUCOMTR-MCNC: 243 MG/DL — HIGH (ref 70–99)
GLUCOSE BLDC GLUCOMTR-MCNC: 290 MG/DL — HIGH (ref 70–99)
GLUCOSE BLDC GLUCOMTR-MCNC: 94 MG/DL — SIGNIFICANT CHANGE UP (ref 70–99)
GLUCOSE BLDC GLUCOMTR-MCNC: 97 MG/DL — SIGNIFICANT CHANGE UP (ref 70–99)
GLUCOSE BLDC GLUCOMTR-MCNC: 98 MG/DL — SIGNIFICANT CHANGE UP (ref 70–99)
GLUCOSE SERPL-MCNC: 202 MG/DL — HIGH (ref 70–99)
POTASSIUM SERPL-MCNC: 3.6 MMOL/L — SIGNIFICANT CHANGE UP (ref 3.5–5)
POTASSIUM SERPL-SCNC: 3.6 MMOL/L — SIGNIFICANT CHANGE UP (ref 3.5–5)
PROT SERPL-MCNC: 6.3 G/DL — SIGNIFICANT CHANGE UP (ref 6–8)
SODIUM SERPL-SCNC: 142 MMOL/L — SIGNIFICANT CHANGE UP (ref 135–146)

## 2020-03-17 PROCEDURE — 99232 SBSQ HOSP IP/OBS MODERATE 35: CPT | Mod: 57

## 2020-03-17 PROCEDURE — 99232 SBSQ HOSP IP/OBS MODERATE 35: CPT

## 2020-03-17 RX ORDER — POTASSIUM CHLORIDE 20 MEQ
10 PACKET (EA) ORAL
Refills: 0 | Status: DISCONTINUED | OUTPATIENT
Start: 2020-03-17 | End: 2020-03-17

## 2020-03-17 RX ORDER — ALBUMIN HUMAN 25 %
3000 VIAL (ML) INTRAVENOUS ONCE
Refills: 0 | Status: COMPLETED | OUTPATIENT
Start: 2020-03-18 | End: 2020-03-18

## 2020-03-17 RX ORDER — METOPROLOL TARTRATE 50 MG
25 TABLET ORAL
Refills: 0 | Status: COMPLETED | OUTPATIENT
Start: 2020-03-17 | End: 2020-03-17

## 2020-03-17 RX ORDER — CHLORHEXIDINE GLUCONATE 213 G/1000ML
1 SOLUTION TOPICAL ONCE
Refills: 0 | Status: COMPLETED | OUTPATIENT
Start: 2020-03-17 | End: 2020-03-17

## 2020-03-17 RX ORDER — HEPARIN SODIUM 5000 [USP'U]/ML
900 INJECTION INTRAVENOUS; SUBCUTANEOUS
Qty: 25000 | Refills: 0 | Status: DISCONTINUED | OUTPATIENT
Start: 2020-03-17 | End: 2020-03-19

## 2020-03-17 RX ORDER — CHLORHEXIDINE GLUCONATE 213 G/1000ML
15 SOLUTION TOPICAL ONCE
Refills: 0 | Status: COMPLETED | OUTPATIENT
Start: 2020-03-17 | End: 2020-03-18

## 2020-03-17 RX ORDER — TEMAZEPAM 15 MG/1
15 CAPSULE ORAL ONCE
Refills: 0 | Status: DISCONTINUED | OUTPATIENT
Start: 2020-03-17 | End: 2020-03-17

## 2020-03-17 RX ADMIN — ATORVASTATIN CALCIUM 40 MILLIGRAM(S): 80 TABLET, FILM COATED ORAL at 21:51

## 2020-03-17 RX ADMIN — Medication 25 MILLIGRAM(S): at 18:19

## 2020-03-17 RX ADMIN — TEMAZEPAM 15 MILLIGRAM(S): 15 CAPSULE ORAL at 23:49

## 2020-03-17 RX ADMIN — TEMAZEPAM 15 MILLIGRAM(S): 15 CAPSULE ORAL at 01:15

## 2020-03-17 RX ADMIN — CHLORHEXIDINE GLUCONATE 1 APPLICATION(S): 213 SOLUTION TOPICAL at 21:45

## 2020-03-17 RX ADMIN — Medication 25 MILLIGRAM(S): at 11:03

## 2020-03-17 RX ADMIN — HEPARIN SODIUM 9 UNIT(S)/HR: 5000 INJECTION INTRAVENOUS; SUBCUTANEOUS at 21:45

## 2020-03-17 RX ADMIN — INSULIN HUMAN 2 UNIT(S)/HR: 100 INJECTION, SOLUTION SUBCUTANEOUS at 01:12

## 2020-03-17 RX ADMIN — INSULIN HUMAN 2 UNIT(S)/HR: 100 INJECTION, SOLUTION SUBCUTANEOUS at 21:45

## 2020-03-17 NOTE — PROGRESS NOTE ADULT - SUBJECTIVE AND OBJECTIVE BOX
CTU Attending Progress Daily Note     17 Mar 2020 10:31    Patient seen as pre-op critical care follow-up    HPI:  59 yrs old with Hx of CAD, D , DL, HTN is here for elective LHC. Patient c/o progressive chest pain on exertion.  Pt had abnormal stress test. (16 Mar 2020 10:28)    See preop testing chart H&P    Interval event for past 24 hr:  FATIMAH EASTMAN  59y had no event.     Current Complains:  FATIMAH EASTMAN has no new complaints    REVIEW OF SYSTEMS:  CONSTITUTIONAL:  [-] weakness, [-] fevers, [-] chills  EYES/ENT: [-] visual changes, [-] vertigo, [-] throat pain   NECK: [-] pain, [-] stiffness  RESPIRATORY: [-] cough, [-] wheezing, [-] hemoptysis, [-] shortness of breath  CARDIOVASCULAR: [-] chest pain, [-] palpitations, [-] orthopnea  GASTROINTESTINAL:    [-]abdominal pain, [-] nausea, [-] vomiting, [-] hematemesis, [-] diarrhea, [-] constipation, [-] melena, [-] hematochezia.  GENITOURINARY: [-] dysuria, [-] frequency, [-] hematuria  NEUROLOGICAL: [-] numbness, [-] weakness  SKIN: [-] itching, [-] burning, [-] rashes, [-] lesions   All other review of systems is negative unless indicated above.    [  ] Unable to assess ROS because :    OBJECTIVE:  ICU Vital Signs Last 24 Hrs  T(C): 36.8 (17 Mar 2020 08:00), Max: 36.8 (17 Mar 2020 08:00)  T(F): 98.2 (17 Mar 2020 08:00), Max: 98.2 (17 Mar 2020 08:00)  HR: 75 (17 Mar 2020 09:00) (65 - 79)  BP: 124/75 (17 Mar 2020 09:00) (124/75 - 158/85)  BP(mean): 94 (17 Mar 2020 09:00) (94 - 120)  ABP: --  ABP(mean): --  RR: 17 (17 Mar 2020 09:00) (14 - 29)  SpO2: 96% (17 Mar 2020 09:00) (96% - 99%)      I&O's Summary    16 Mar 2020 07:  -  17 Mar 2020 07:00  --------------------------------------------------------  IN: 299.5 mL / OUT: 1150 mL / NET: -850.5 mL    17 Mar 2020 07:01  -  17 Mar 2020 10:31  --------------------------------------------------------  IN: 240 mL / OUT: 500 mL / NET: -260 mL      PHYSICAL EXAM:  General: WN/WD NAD    HEENT:     [+] NCAT  [+] EOMI  [-] Conjuctival edema   [-] Icterus   [-] Thrush   [-] ETT  [-] NGT/OGT    Neck:         [+] FROM   [-] JVD     [-] Nodes     [-] Masses    [+] Mid-line trachea    [-] Tracheostomy    Chest:        [-] SubQ emphysema    Lungs:          [+] CTA   [-] Rhonchi   [-] Rales    [-] Wheezing    [-] Decreased BS    [-] Dullness R L    Cardiac:       [+] S1 [+] S2    [+] RRR   [-] Irregular   [-] S3   [-] S4    [-] Murmurs    [-] Rub    Abdomen:    [+] BS    [+] Soft    [+] Non-tender     [-] Distended    [-] Organomegaly  [-] PEG    Extremities:   [-] Cyanosis U/L   [-] Clubbing  U/L  [-] LE/UE Edema   [+] Capillary refill    [+] Pulses     Neuro:        [+] Awake   [+]  Alert   [-] Confused   [-] Lethargic   [-] Sedated   [-] Generalized Weakness    Skin:        [-] Rashes    [-] Erythema    [+] IV sites intact   [-] Sacral decubitus      LINES:    CAPILLARY BLOOD GLUCOSE      POCT Blood Glucose.: 290 mg/dL (17 Mar 2020 10:23)    CAPILLARY BLOOD GLUCOSE      POCT Blood Glucose.: 290 mg/dL (17 Mar 2020 10:23)  POCT Blood Glucose.: 98 mg/dL (17 Mar 2020 08:10)  POCT Blood Glucose.: 114 mg/dL (17 Mar 2020 06:26)  POCT Blood Glucose.: 172 mg/dL (17 Mar 2020 05:25)  POCT Blood Glucose.: 170 mg/dL (17 Mar 2020 04:25)  POCT Blood Glucose.: 165 mg/dL (17 Mar 2020 03:22)  POCT Blood Glucose.: 185 mg/dL (17 Mar 2020 01:55)  POCT Blood Glucose.: 198 mg/dL (17 Mar 2020 01:05)  POCT Blood Glucose.: 237 mg/dL (16 Mar 2020 23:17)      HOSPITAL MEDICATIONS:  MEDICATIONS  (STANDING):  atorvastatin 40 milliGRAM(s) Oral at bedtime  chlorhexidine 0.12% Liquid 15 milliLiter(s) Swish and Spit once  dextrose 5%. 1000 milliLiter(s) (50 mL/Hr) IV Continuous <Continuous>  dextrose 50% Injectable 12.5 Gram(s) IV Push once  dextrose 50% Injectable 25 Gram(s) IV Push once  dextrose 50% Injectable 25 Gram(s) IV Push once  influenza   Vaccine 0.5 milliLiter(s) IntraMuscular once  insulin lispro (HumaLOG) corrective regimen sliding scale   SubCutaneous three times a day before meals  insulin regular Infusion 2 Unit(s)/Hr (2 mL/Hr) IV Continuous <Continuous>  sodium chloride 0.9%. 1000 milliLiter(s) (55 mL/Hr) IV Continuous <Continuous>    MEDICATIONS  (PRN):  dextrose 40% Gel 15 Gram(s) Oral once PRN Blood Glucose LESS THAN 70 milliGRAM(s)/deciliter  glucagon  Injectable 1 milliGRAM(s) IntraMuscular once PRN Glucose LESS THAN 70 milligrams/deciliter      LABS:                          14.4   6.24  )-----------( 182      ( 16 Mar 2020 14:55 )             40.6     03-17    142  |  104  |  12  ----------------------------<  202<H>  3.6   |  25  |  0.7    Ca    9.1      17 Mar 2020 01:30    TPro  6.3  /  Alb  4.4  /  TBili  1.3<H>  /  DBili  x   /  AST  25  /  ALT  26  /  AlkPhos  80      PT/INR - ( 16 Mar 2020 14:55 )   PT: 10.50 sec;   INR: 0.91 ratio         PTT - ( 17 Mar 2020 01:30 )  PTT:55.1 sec  Urinalysis Basic - ( 16 Mar 2020 22:50 )    Color: Light Yellow / Appearance: Clear / S.014 / pH: x  Gluc: x / Ketone: Negative  / Bili: Negative / Urobili: <2 mg/dL   Blood: x / Protein: Negative / Nitrite: Negative   Leuk Esterase: Negative / RBC: x / WBC x   Sq Epi: x / Non Sq Epi: x / Bacteria: x          RADIOLOGY:  Reviewed and interpreted by me  CXR from 20 shows [+] mild congestion, [-] pneumothorax, [-] R/L effusion, [-] cardiomegaly,       ECG:  Reviewed and interpreted by me:   QTC:    preop workup results    echo:  < from: Transthoracic Echocardiogram (20 @ 14:48) >  Summary:   1. Mid and apical anterior septum and mid and apical inferior septum are abnormal as described above.   2. LV Ejection Fraction by Melendrez's Method with a biplane EF of 57 %.   3. Normal left ventricular size and wall thicknesses, with normal systolic and diastolic function.   4. The mean global longitudinal peak strain by speckle tracking is-17.6% which is borderline reduced.   5. LA volume Index is 14.3 ml/m² ml/m2.    < end of copied text >    carotids:  < from: VA Duplex Carotid, Bilat (20 @ 16:46) >  Impression:    20-39% stenosis of the right internal carotid artery.  20-39% stenosis of the left internal carotid artery.    < end of copied text >    CT chest:  < from: CT Chest No Cont (20 @ 23:51) >  IMPRESSION:      No CT evidence for acute intrathoracic pathology.    < end of copied text >    UA:  Urinalysis (20 @ 22:50)    pH Urine: 6.5    Glucose Qualitative, Urine: >= 1000 mg/dL    Blood, Urine: Negative    Color: Light Yellow    Urine Appearance: Clear    Bilirubin: Negative    Ketone - Urine: Negative    Specific Gravity: 1.014    Protein, Urine: Negative    Urobilinogen: <2 mg/dL    Nitrite: Negative    Leukocyte Esterase Concentration: Negative      MRSA nasal swab:  MRSA/MSSA PCR (20 @ 21:00)    MRSA PCR Result.: Negative: By: Real-Time PCR (Polymerase Reaction Method)      A1C:  Hemoglobin A1C with Mean Plasma Glucose (20 @ 14:55)    Hemoglobin A1C, Whole Blood: 9.1: Method: Immunoassay       Reference Range                4.0-5.6%       High risk (prediabetic)        5.7-6.4%       Diabetic, diagnostic             >=6.5%       ADA diabetic treatment goal       <7.0%  The Hemoglobin A1c testing is NGSP-certified.Reference ranges are based  upon the 2010 recommendations of  the American Diabetes Association.  Interpretation may vary for children  and adolescents. %    Mean Plasma Glucose: 214 mg/dL      Verify Now:  153    Assessment:  CAD Preop CABG  DM with hyperglycemia    PAST MEDICAL & SURGICAL HISTORY:  CAD (coronary artery disease): s/p PTCA WITH 2 STENTS  H/O hyperlipidemia  H/O: HTN (hypertension)  DM2 (diabetes mellitus, type 2)  History of tonsillectomy  H/O knee surgery      PLAN:  OR planning per CTS  Pulm: Encourage coughing, deep breathing and use of incentive spirometry.   Cardio: Monitor telemetry/alarms. Continue cardiac meds  GI: Tolerating diet. Continue stool softeners. Continue GI prophylaxis  Renal: monitor urine output, supplement electrolytes as needed  Vasc: Heparin SC/SCDs for DVT prophylaxis  Heme: Monitor H/H.   ID: Off antibiotics. Stable.  Endocrine: Monitor finger stick blood sugar and control hyperglycemia with insulin  Physical Therapy: OOB/ambulate        Discussed with Cardiothoracic Team at AM rounds.

## 2020-03-17 NOTE — PROGRESS NOTE ADULT - SUBJECTIVE AND OBJECTIVE BOX
OPERATIVE PROCEDURE(s):                pre-op CABG                       59yMale  SURGEON(s): TONY Paez  SUBJECTIVE ASSESSMENT: waiting for surgery  Vital Signs Last 24 Hrs  T(F): 98.3 (17 Mar 2020 12:00), Max: 98.3 (17 Mar 2020 12:00)  HR: 67 (17 Mar 2020 15:00) (65 - 82)  BP: 109/67 (17 Mar 2020 15:00) (99/63 - 158/85)  BP(mean): 83 (17 Mar 2020 15:00) (76 - 120)  RR: 19 (17 Mar 2020 15:00) (14 - 29)  SpO2: 97% (17 Mar 2020 15:00) (96% - 99%)    I&O's Detail    16 Mar 2020 07:01  -  17 Mar 2020 07:00  --------------------------------------------------------  IN:    heparin Infusion: 76.5 mL    insulin regular Infusion: 23 mL    Oral Fluid: 200 mL  Total IN: 299.5 mL    OUT:    Voided: 1150 mL  Total OUT: 1150 mL    Net:   I&O's Detail    16 Mar 2020 07:01  -  17 Mar 2020 07:00  --------------------------------------------------------  Total NET: -850.5 mL    CAPILLARY BLOOD GLUCOSE      POCT Blood Glucose.: 94 mg/dL (17 Mar 2020 15:12)  POCT Blood Glucose.: 97 mg/dL (17 Mar 2020 14:14)  POCT Blood Glucose.: 225 mg/dL (17 Mar 2020 13:20)  POCT Blood Glucose.: 165 mg/dL (17 Mar 2020 12:23)  POCT Blood Glucose.: 234 mg/dL (17 Mar 2020 11:05)  POCT Blood Glucose.: 290 mg/dL (17 Mar 2020 10:23)  POCT Blood Glucose.: 98 mg/dL (17 Mar 2020 08:10)  POCT Blood Glucose.: 114 mg/dL (17 Mar 2020 06:26)  POCT Blood Glucose.: 172 mg/dL (17 Mar 2020 05:25)  POCT Blood Glucose.: 170 mg/dL (17 Mar 2020 04:25)  POCT Blood Glucose.: 165 mg/dL (17 Mar 2020 03:22)  POCT Blood Glucose.: 185 mg/dL (17 Mar 2020 01:55)  POCT Blood Glucose.: 198 mg/dL (17 Mar 2020 01:05)  POCT Blood Glucose.: 237 mg/dL (16 Mar 2020 23:17)      LABS:                        14.4   6.24  )-----------( 182      ( 16 Mar 2020 14:55 )             40.6<L>                        15.4   8.32  )-----------( 193      ( 16 Mar 2020 10:21 )             43.3     03-17    142  |  104  |  12  ----------------------------<  202<H>  3.6   |  25  |  0.7  -16    136  |  100  |  11  ----------------------------<  257<H>  4.3   |  26  |  0.8    Ca    9.1      17 Mar 2020 01:30    TPro  6.3 [6.0 - 8.0]  /  Alb  4.4 [3.5 - 5.2]  /  TBili  1.3<H> [0.2 - 1.2]  /  DBili  x   /  AST  25 [0 - 41]  /  ALT  26 [0 - 41]  /  AlkPhos  80 [30 - 115]  -17    PT/INR - ( 16 Mar 2020 14:55 )   PT: ;   INR: 0.91 ratio         PTT - ( 17 Mar 2020 14:40 )  PTT:44.8 sec, PTT - ( 17 Mar 2020 01:30 )  PTT:55.1 sec  Urinalysis Basic - ( 16 Mar 2020 22:50 )    Color: Light Yellow / Appearance: Clear / S.014 / pH: x  Gluc: x / Ketone: Negative  / Bili: Negative / Urobili: <2 mg/dL   Blood: x / Protein: Negative / Nitrite: Negative   Leuk Esterase: Negative / RBC: x / WBC x   Sq Epi: x / Non Sq Epi: x / Bacteria: x    MEDICATIONS  (STANDING):  atorvastatin 40 milliGRAM(s) Oral at bedtime  chlorhexidine 0.12% Liquid 15 milliLiter(s) Swish and Spit once  chlorhexidine 0.12% Liquid 15 milliLiter(s) Swish and Spit once  chlorhexidine 4% Liquid 1 Application(s) Topical once  dextrose 5%. 1000 milliLiter(s) (50 mL/Hr) IV Continuous <Continuous>  dextrose 50% Injectable 12.5 Gram(s) IV Push once  dextrose 50% Injectable 25 Gram(s) IV Push once  dextrose 50% Injectable 25 Gram(s) IV Push once  heparin  Infusion 900 Unit(s)/Hr (9 mL/Hr) IV Continuous <Continuous>  influenza   Vaccine 0.5 milliLiter(s) IntraMuscular once  insulin lispro (HumaLOG) corrective regimen sliding scale   SubCutaneous three times a day before meals  insulin regular Infusion 2 Unit(s)/Hr (2 mL/Hr) IV Continuous <Continuous>  metoprolol tartrate 25 milliGRAM(s) Oral two times a day    MEDICATIONS  (PRN):  dextrose 40% Gel 15 Gram(s) Oral once PRN Blood Glucose LESS THAN 70 milliGRAM(s)/deciliter  glucagon  Injectable 1 milliGRAM(s) IntraMuscular once PRN Glucose LESS THAN 70 milligrams/deciliter    Allergies    No Known Allergies    Intolerances      Ambulation/Activity Status:    Assessment/Plan:  59y Male pre-op CABG  - Case and plan discussed with CTU Intensivist and CT Surgeon - Dr. Yadav/Keyur/Gorge   - Continue CTU supportive care    - Continue DVT prophylaxis  - Incentive Spirometry 10 times an hour  - Verify today lower than yesterday, surgery postponed today  - pre-op for 2020  - stop heparin at 5 AM  - NPO after MN    Social Service Disposition:  for OR tomorrow OPERATIVE PROCEDURE(s):                pre-op CABG                       59yMale  SURGEON(s): TONY Paez  SUBJECTIVE ASSESSMENT: waiting for surgery  Vital Signs Last 24 Hrs  T(F): 98.3 (17 Mar 2020 12:00), Max: 98.3 (17 Mar 2020 12:00)  HR: 67 (17 Mar 2020 15:00) (65 - 82)  BP: 109/67 (17 Mar 2020 15:00) (99/63 - 158/85)  BP(mean): 83 (17 Mar 2020 15:00) (76 - 120)  RR: 19 (17 Mar 2020 15:00) (14 - 29)  SpO2: 97% (17 Mar 2020 15:00) (96% - 99%)    PE- alert and oriented x 3  chest cta bl  heart s1s2  abd pos bs soft nt  extrem- no edema            I&O's Detail    16 Mar 2020 07:01  -  17 Mar 2020 07:00  --------------------------------------------------------  IN:    heparin Infusion: 76.5 mL    insulin regular Infusion: 23 mL    Oral Fluid: 200 mL  Total IN: 299.5 mL    OUT:    Voided: 1150 mL  Total OUT: 1150 mL    Net:   I&O's Detail    16 Mar 2020 07:01  -  17 Mar 2020 07:00  --------------------------------------------------------  Total NET: -850.5 mL    CAPILLARY BLOOD GLUCOSE      POCT Blood Glucose.: 94 mg/dL (17 Mar 2020 15:12)  POCT Blood Glucose.: 97 mg/dL (17 Mar 2020 14:14)  POCT Blood Glucose.: 225 mg/dL (17 Mar 2020 13:20)  POCT Blood Glucose.: 165 mg/dL (17 Mar 2020 12:23)  POCT Blood Glucose.: 234 mg/dL (17 Mar 2020 11:05)  POCT Blood Glucose.: 290 mg/dL (17 Mar 2020 10:23)  POCT Blood Glucose.: 98 mg/dL (17 Mar 2020 08:10)  POCT Blood Glucose.: 114 mg/dL (17 Mar 2020 06:26)  POCT Blood Glucose.: 172 mg/dL (17 Mar 2020 05:25)  POCT Blood Glucose.: 170 mg/dL (17 Mar 2020 04:25)  POCT Blood Glucose.: 165 mg/dL (17 Mar 2020 03:22)  POCT Blood Glucose.: 185 mg/dL (17 Mar 2020 01:55)  POCT Blood Glucose.: 198 mg/dL (17 Mar 2020 01:05)  POCT Blood Glucose.: 237 mg/dL (16 Mar 2020 23:17)      LABS:                        14.4   6.24  )-----------( 182      ( 16 Mar 2020 14:55 )             40.6<L>                        15.4   8.32  )-----------( 193      ( 16 Mar 2020 10:21 )             43.3     03-17    142  |  104  |  12  ----------------------------<  202<H>  3.6   |  25  |  0.7  03-16    136  |  100  |  11  ----------------------------<  257<H>  4.3   |  26  |  0.8    Ca    9.1      17 Mar 2020 01:30    TPro  6.3 [6.0 - 8.0]  /  Alb  4.4 [3.5 - 5.2]  /  TBili  1.3<H> [0.2 - 1.2]  /  DBili  x   /  AST  25 [0 - 41]  /  ALT  26 [0 - 41]  /  AlkPhos  80 [30 - 115]  -17    PT/INR - ( 16 Mar 2020 14:55 )   PT: ;   INR: 0.91 ratio         PTT - ( 17 Mar 2020 14:40 )  PTT:44.8 sec, PTT - ( 17 Mar 2020 01:30 )  PTT:55.1 sec  Urinalysis Basic - ( 16 Mar 2020 22:50 )    Color: Light Yellow / Appearance: Clear / S.014 / pH: x  Gluc: x / Ketone: Negative  / Bili: Negative / Urobili: <2 mg/dL   Blood: x / Protein: Negative / Nitrite: Negative   Leuk Esterase: Negative / RBC: x / WBC x   Sq Epi: x / Non Sq Epi: x / Bacteria: x    MEDICATIONS  (STANDING):  atorvastatin 40 milliGRAM(s) Oral at bedtime  chlorhexidine 0.12% Liquid 15 milliLiter(s) Swish and Spit once  chlorhexidine 0.12% Liquid 15 milliLiter(s) Swish and Spit once  chlorhexidine 4% Liquid 1 Application(s) Topical once  dextrose 5%. 1000 milliLiter(s) (50 mL/Hr) IV Continuous <Continuous>  dextrose 50% Injectable 12.5 Gram(s) IV Push once  dextrose 50% Injectable 25 Gram(s) IV Push once  dextrose 50% Injectable 25 Gram(s) IV Push once  heparin  Infusion 900 Unit(s)/Hr (9 mL/Hr) IV Continuous <Continuous>  influenza   Vaccine 0.5 milliLiter(s) IntraMuscular once  insulin lispro (HumaLOG) corrective regimen sliding scale   SubCutaneous three times a day before meals  insulin regular Infusion 2 Unit(s)/Hr (2 mL/Hr) IV Continuous <Continuous>  metoprolol tartrate 25 milliGRAM(s) Oral two times a day    MEDICATIONS  (PRN):  dextrose 40% Gel 15 Gram(s) Oral once PRN Blood Glucose LESS THAN 70 milliGRAM(s)/deciliter  glucagon  Injectable 1 milliGRAM(s) IntraMuscular once PRN Glucose LESS THAN 70 milligrams/deciliter    Allergies    No Known Allergies    Intolerances      Ambulation/Activity Status:    Assessment/Plan:  59y Male pre-op CABG  - Case and plan discussed with CTU Intensivist and CT Surgeon - Dr. Yadav/Keyur/Gorge   - Continue CTU supportive care    - Continue DVT prophylaxis  - Incentive Spirometry 10 times an hour  - Verify today lower than yesterday, surgery postponed today  - pre-op for 2020  - stop heparin at 5 AM  - NPO after MN    Social Service Disposition:  for OR tomorrow OPERATIVE PROCEDURE(s):                pre-op CABG                       59yMale  SURGEON(s): TONY Paez  SUBJECTIVE ASSESSMENT: waiting for surgery  Vital Signs Last 24 Hrs  T(F): 98.3 (17 Mar 2020 12:00), Max: 98.3 (17 Mar 2020 12:00)  HR: 67 (17 Mar 2020 15:00) (65 - 82)  BP: 109/67 (17 Mar 2020 15:00) (99/63 - 158/85)  BP: RUE: 109/67    LUE: 110/72     RR: 19 (17 Mar 2020 15:00) (14 - 29)  SpO2: 97% (17 Mar 2020 15:00) (96% - 99%)      PE- alert and oriented x 3  chest cta bl  heart s1s2  abd pos bs soft nt  extrem- no edema            I&O's Detail    16 Mar 2020 07:01  -  17 Mar 2020 07:00  --------------------------------------------------------  IN:    heparin Infusion: 76.5 mL    insulin regular Infusion: 23 mL    Oral Fluid: 200 mL  Total IN: 299.5 mL    OUT:    Voided: 1150 mL  Total OUT: 1150 mL    Net:   I&O's Detail    16 Mar 2020 07:01  -  17 Mar 2020 07:00  --------------------------------------------------------  Total NET: -850.5 mL    CAPILLARY BLOOD GLUCOSE      POCT Blood Glucose.: 94 mg/dL (17 Mar 2020 15:12)  POCT Blood Glucose.: 97 mg/dL (17 Mar 2020 14:14)  POCT Blood Glucose.: 225 mg/dL (17 Mar 2020 13:20)  POCT Blood Glucose.: 165 mg/dL (17 Mar 2020 12:23)  POCT Blood Glucose.: 234 mg/dL (17 Mar 2020 11:05)  POCT Blood Glucose.: 290 mg/dL (17 Mar 2020 10:23)  POCT Blood Glucose.: 98 mg/dL (17 Mar 2020 08:10)  POCT Blood Glucose.: 114 mg/dL (17 Mar 2020 06:26)  POCT Blood Glucose.: 172 mg/dL (17 Mar 2020 05:25)  POCT Blood Glucose.: 170 mg/dL (17 Mar 2020 04:25)  POCT Blood Glucose.: 165 mg/dL (17 Mar 2020 03:22)  POCT Blood Glucose.: 185 mg/dL (17 Mar 2020 01:55)  POCT Blood Glucose.: 198 mg/dL (17 Mar 2020 01:05)  POCT Blood Glucose.: 237 mg/dL (16 Mar 2020 23:17)      LABS:                        14.4   6.24  )-----------( 182      ( 16 Mar 2020 14:55 )             40.6<L>                        15.4   8.32  )-----------( 193      ( 16 Mar 2020 10:21 )             43.3     03-17    142  |  104  |  12  ----------------------------<  202<H>  3.6   |  25  |  0.7  03-16    136  |  100  |  11  ----------------------------<  257<H>  4.3   |  26  |  0.8    Ca    9.1      17 Mar 2020 01:30    TPro  6.3 [6.0 - 8.0]  /  Alb  4.4 [3.5 - 5.2]  /  TBili  1.3<H> [0.2 - 1.2]  /  DBili  x   /  AST  25 [0 - 41]  /  ALT  26 [0 - 41]  /  AlkPhos  80 [30 - 115]  -17    PT/INR - ( 16 Mar 2020 14:55 )   PT: ;   INR: 0.91 ratio         PTT - ( 17 Mar 2020 14:40 )  PTT:44.8 sec, PTT - ( 17 Mar 2020 01:30 )  PTT:55.1 sec  Urinalysis Basic - ( 16 Mar 2020 22:50 )    Color: Light Yellow / Appearance: Clear / S.014 / pH: x  Gluc: x / Ketone: Negative  / Bili: Negative / Urobili: <2 mg/dL   Blood: x / Protein: Negative / Nitrite: Negative   Leuk Esterase: Negative / RBC: x / WBC x   Sq Epi: x / Non Sq Epi: x / Bacteria: x    MEDICATIONS  (STANDING):  atorvastatin 40 milliGRAM(s) Oral at bedtime  chlorhexidine 0.12% Liquid 15 milliLiter(s) Swish and Spit once  chlorhexidine 0.12% Liquid 15 milliLiter(s) Swish and Spit once  chlorhexidine 4% Liquid 1 Application(s) Topical once  dextrose 5%. 1000 milliLiter(s) (50 mL/Hr) IV Continuous <Continuous>  dextrose 50% Injectable 12.5 Gram(s) IV Push once  dextrose 50% Injectable 25 Gram(s) IV Push once  dextrose 50% Injectable 25 Gram(s) IV Push once  heparin  Infusion 900 Unit(s)/Hr (9 mL/Hr) IV Continuous <Continuous>  influenza   Vaccine 0.5 milliLiter(s) IntraMuscular once  insulin lispro (HumaLOG) corrective regimen sliding scale   SubCutaneous three times a day before meals  insulin regular Infusion 2 Unit(s)/Hr (2 mL/Hr) IV Continuous <Continuous>  metoprolol tartrate 25 milliGRAM(s) Oral two times a day    MEDICATIONS  (PRN):  dextrose 40% Gel 15 Gram(s) Oral once PRN Blood Glucose LESS THAN 70 milliGRAM(s)/deciliter  glucagon  Injectable 1 milliGRAM(s) IntraMuscular once PRN Glucose LESS THAN 70 milligrams/deciliter    Allergies    No Known Allergies    Intolerances      Ambulation/Activity Status:    Assessment/Plan:  59y Male pre-op CABG  - Case and plan discussed with CTU Intensivist and CT Surgeon - Dr. Yadav/Keyur/Gorge   - Continue CTU supportive care    - Continue DVT prophylaxis  - Incentive Spirometry 10 times an hour  - Verify today lower than yesterday, surgery postponed today  - pre-op for 2020  - stop heparin at 5 AM  - NPO after MN    Social Service Disposition:  for OR tomorrow

## 2020-03-17 NOTE — CHART NOTE - NSCHARTNOTEFT_GEN_A_CORE
Feels well. Denies chest pain. Lungs clear. Heart RRR. Verify now markedly abnormal today. Will hold for CABG today because risk of bleeding very high. Will ollow closely in CTU. Check verify now xander am and if better CABG tomorrow or else the following day.

## 2020-03-18 ENCOUNTER — TRANSCRIPTION ENCOUNTER (OUTPATIENT)
Age: 60
End: 2020-03-18

## 2020-03-18 LAB
ALBUMIN SERPL ELPH-MCNC: 4.5 G/DL — SIGNIFICANT CHANGE UP (ref 3.5–5.2)
ALP SERPL-CCNC: 38 U/L — SIGNIFICANT CHANGE UP (ref 30–115)
ALT FLD-CCNC: 23 U/L — SIGNIFICANT CHANGE UP (ref 0–41)
ANION GAP SERPL CALC-SCNC: 12 MMOL/L — SIGNIFICANT CHANGE UP (ref 7–14)
APTT BLD: 25 SEC — LOW (ref 27–39.2)
AST SERPL-CCNC: 34 U/L — SIGNIFICANT CHANGE UP (ref 0–41)
BASOPHILS # BLD AUTO: 0.04 K/UL — SIGNIFICANT CHANGE UP (ref 0–0.2)
BASOPHILS # BLD AUTO: 0.05 K/UL — SIGNIFICANT CHANGE UP (ref 0–0.2)
BASOPHILS NFR BLD AUTO: 0.4 % — SIGNIFICANT CHANGE UP (ref 0–1)
BASOPHILS NFR BLD AUTO: 0.6 % — SIGNIFICANT CHANGE UP (ref 0–1)
BILIRUB SERPL-MCNC: 1.2 MG/DL — SIGNIFICANT CHANGE UP (ref 0.2–1.2)
BUN SERPL-MCNC: 9 MG/DL — LOW (ref 10–20)
CALCIUM SERPL-MCNC: 8.3 MG/DL — LOW (ref 8.5–10.1)
CHLORIDE SERPL-SCNC: 109 MMOL/L — SIGNIFICANT CHANGE UP (ref 98–110)
CO2 SERPL-SCNC: 24 MMOL/L — SIGNIFICANT CHANGE UP (ref 17–32)
CREAT SERPL-MCNC: 0.9 MG/DL — SIGNIFICANT CHANGE UP (ref 0.7–1.5)
EOSINOPHIL # BLD AUTO: 0.12 K/UL — SIGNIFICANT CHANGE UP (ref 0–0.7)
EOSINOPHIL # BLD AUTO: 0.15 K/UL — SIGNIFICANT CHANGE UP (ref 0–0.7)
EOSINOPHIL NFR BLD AUTO: 1.3 % — SIGNIFICANT CHANGE UP (ref 0–8)
EOSINOPHIL NFR BLD AUTO: 1.7 % — SIGNIFICANT CHANGE UP (ref 0–8)
GAS PNL BLDA: SIGNIFICANT CHANGE UP
GAS PNL BLDA: SIGNIFICANT CHANGE UP
GLUCOSE BLDC GLUCOMTR-MCNC: 104 MG/DL — HIGH (ref 70–99)
GLUCOSE BLDC GLUCOMTR-MCNC: 105 MG/DL — HIGH (ref 70–99)
GLUCOSE BLDC GLUCOMTR-MCNC: 116 MG/DL — HIGH (ref 70–99)
GLUCOSE BLDC GLUCOMTR-MCNC: 118 MG/DL — HIGH (ref 70–99)
GLUCOSE BLDC GLUCOMTR-MCNC: 118 MG/DL — HIGH (ref 70–99)
GLUCOSE BLDC GLUCOMTR-MCNC: 121 MG/DL — HIGH (ref 70–99)
GLUCOSE BLDC GLUCOMTR-MCNC: 163 MG/DL — HIGH (ref 70–99)
GLUCOSE BLDC GLUCOMTR-MCNC: 58 MG/DL — LOW (ref 70–99)
GLUCOSE BLDC GLUCOMTR-MCNC: 79 MG/DL — SIGNIFICANT CHANGE UP (ref 70–99)
GLUCOSE SERPL-MCNC: 133 MG/DL — HIGH (ref 70–99)
HCT VFR BLD CALC: 30 % — LOW (ref 42–52)
HCT VFR BLD CALC: 30.5 % — LOW (ref 42–52)
HGB BLD-MCNC: 10.5 G/DL — LOW (ref 14–18)
HGB BLD-MCNC: 10.9 G/DL — LOW (ref 14–18)
IMM GRANULOCYTES NFR BLD AUTO: 0.9 % — HIGH (ref 0.1–0.3)
IMM GRANULOCYTES NFR BLD AUTO: 1.1 % — HIGH (ref 0.1–0.3)
INR BLD: 1.16 RATIO — SIGNIFICANT CHANGE UP (ref 0.65–1.3)
LYMPHOCYTES # BLD AUTO: 1.78 K/UL — SIGNIFICANT CHANGE UP (ref 1.2–3.4)
LYMPHOCYTES # BLD AUTO: 20 % — LOW (ref 20.5–51.1)
LYMPHOCYTES # BLD AUTO: 3.19 K/UL — SIGNIFICANT CHANGE UP (ref 1.2–3.4)
LYMPHOCYTES # BLD AUTO: 35.1 % — SIGNIFICANT CHANGE UP (ref 20.5–51.1)
MAGNESIUM SERPL-MCNC: 3.1 MG/DL — CRITICAL HIGH (ref 1.8–2.4)
MCHC RBC-ENTMCNC: 31.1 PG — HIGH (ref 27–31)
MCHC RBC-ENTMCNC: 31.4 PG — HIGH (ref 27–31)
MCHC RBC-ENTMCNC: 35 G/DL — SIGNIFICANT CHANGE UP (ref 32–37)
MCHC RBC-ENTMCNC: 35.7 G/DL — SIGNIFICANT CHANGE UP (ref 32–37)
MCV RBC AUTO: 87.9 FL — SIGNIFICANT CHANGE UP (ref 80–94)
MCV RBC AUTO: 88.8 FL — SIGNIFICANT CHANGE UP (ref 80–94)
MONOCYTES # BLD AUTO: 0.37 K/UL — SIGNIFICANT CHANGE UP (ref 0.1–0.6)
MONOCYTES # BLD AUTO: 0.47 K/UL — SIGNIFICANT CHANGE UP (ref 0.1–0.6)
MONOCYTES NFR BLD AUTO: 4.1 % — SIGNIFICANT CHANGE UP (ref 1.7–9.3)
MONOCYTES NFR BLD AUTO: 5.3 % — SIGNIFICANT CHANGE UP (ref 1.7–9.3)
NEUTROPHILS # BLD AUTO: 5.22 K/UL — SIGNIFICANT CHANGE UP (ref 1.4–6.5)
NEUTROPHILS # BLD AUTO: 6.43 K/UL — SIGNIFICANT CHANGE UP (ref 1.4–6.5)
NEUTROPHILS NFR BLD AUTO: 57.4 % — SIGNIFICANT CHANGE UP (ref 42.2–75.2)
NEUTROPHILS NFR BLD AUTO: 72.1 % — SIGNIFICANT CHANGE UP (ref 42.2–75.2)
NRBC # BLD: 0 /100 WBCS — SIGNIFICANT CHANGE UP (ref 0–0)
NRBC # BLD: 0 /100 WBCS — SIGNIFICANT CHANGE UP (ref 0–0)
PLATELET # BLD AUTO: 101 K/UL — LOW (ref 130–400)
PLATELET # BLD AUTO: 157 K/UL — SIGNIFICANT CHANGE UP (ref 130–400)
POTASSIUM SERPL-MCNC: 3.5 MMOL/L — SIGNIFICANT CHANGE UP (ref 3.5–5)
POTASSIUM SERPL-SCNC: 3.5 MMOL/L — SIGNIFICANT CHANGE UP (ref 3.5–5)
PROT SERPL-MCNC: 5.6 G/DL — LOW (ref 6–8)
PROTHROM AB SERPL-ACNC: 13.3 SEC — HIGH (ref 9.95–12.87)
RBC # BLD: 3.38 M/UL — LOW (ref 4.7–6.1)
RBC # BLD: 3.47 M/UL — LOW (ref 4.7–6.1)
RBC # FLD: 12 % — SIGNIFICANT CHANGE UP (ref 11.5–14.5)
RBC # FLD: 12.1 % — SIGNIFICANT CHANGE UP (ref 11.5–14.5)
SODIUM SERPL-SCNC: 145 MMOL/L — SIGNIFICANT CHANGE UP (ref 135–146)
TSH RECEP AB FLD-ACNC: <1.1 IU/L — SIGNIFICANT CHANGE UP (ref 0–1.75)
WBC # BLD: 8.92 K/UL — SIGNIFICANT CHANGE UP (ref 4.8–10.8)
WBC # BLD: 9.08 K/UL — SIGNIFICANT CHANGE UP (ref 4.8–10.8)
WBC # FLD AUTO: 8.92 K/UL — SIGNIFICANT CHANGE UP (ref 4.8–10.8)
WBC # FLD AUTO: 9.08 K/UL — SIGNIFICANT CHANGE UP (ref 4.8–10.8)

## 2020-03-18 PROCEDURE — 33517 CABG ARTERY-VEIN SINGLE: CPT | Mod: AS

## 2020-03-18 PROCEDURE — 35600 OPEN HRV UXTR ART 1 SGM CAB: CPT | Mod: AS

## 2020-03-18 PROCEDURE — 33508 ENDOSCOPIC VEIN HARVEST: CPT | Mod: AS

## 2020-03-18 PROCEDURE — 33534 CABG ARTERIAL TWO: CPT | Mod: AS

## 2020-03-18 PROCEDURE — 93010 ELECTROCARDIOGRAM REPORT: CPT

## 2020-03-18 PROCEDURE — 71045 X-RAY EXAM CHEST 1 VIEW: CPT | Mod: 26

## 2020-03-18 PROCEDURE — 33534 CABG ARTERIAL TWO: CPT

## 2020-03-18 PROCEDURE — 33508 ENDOSCOPIC VEIN HARVEST: CPT

## 2020-03-18 PROCEDURE — 35600 OPEN HRV UXTR ART 1 SGM CAB: CPT

## 2020-03-18 PROCEDURE — 33517 CABG ARTERY-VEIN SINGLE: CPT

## 2020-03-18 RX ORDER — DEXTROSE 50 % IN WATER 50 %
25 SYRINGE (ML) INTRAVENOUS
Refills: 0 | Status: DISCONTINUED | OUTPATIENT
Start: 2020-03-18 | End: 2020-03-23

## 2020-03-18 RX ORDER — OXYCODONE HYDROCHLORIDE 5 MG/1
10 TABLET ORAL EVERY 4 HOURS
Refills: 0 | Status: DISCONTINUED | OUTPATIENT
Start: 2020-03-18 | End: 2020-03-23

## 2020-03-18 RX ORDER — ONDANSETRON 8 MG/1
4 TABLET, FILM COATED ORAL ONCE
Refills: 0 | Status: DISCONTINUED | OUTPATIENT
Start: 2020-03-18 | End: 2020-03-22

## 2020-03-18 RX ORDER — POTASSIUM CHLORIDE 20 MEQ
20 PACKET (EA) ORAL ONCE
Refills: 0 | Status: COMPLETED | OUTPATIENT
Start: 2020-03-18 | End: 2020-03-18

## 2020-03-18 RX ORDER — MEPERIDINE HYDROCHLORIDE 50 MG/ML
25 INJECTION INTRAMUSCULAR; INTRAVENOUS; SUBCUTANEOUS ONCE
Refills: 0 | Status: DISCONTINUED | OUTPATIENT
Start: 2020-03-18 | End: 2020-03-19

## 2020-03-18 RX ORDER — KETOROLAC TROMETHAMINE 30 MG/ML
30 SYRINGE (ML) INJECTION ONCE
Refills: 0 | Status: DISCONTINUED | OUTPATIENT
Start: 2020-03-18 | End: 2020-03-18

## 2020-03-18 RX ORDER — DEXMEDETOMIDINE HYDROCHLORIDE IN 0.9% SODIUM CHLORIDE 4 UG/ML
0.25 INJECTION INTRAVENOUS
Qty: 200 | Refills: 0 | Status: DISCONTINUED | OUTPATIENT
Start: 2020-03-18 | End: 2020-03-19

## 2020-03-18 RX ORDER — NOREPINEPHRINE BITARTRATE/D5W 8 MG/250ML
0.05 PLASTIC BAG, INJECTION (ML) INTRAVENOUS
Qty: 8 | Refills: 0 | Status: DISCONTINUED | OUTPATIENT
Start: 2020-03-18 | End: 2020-03-20

## 2020-03-18 RX ORDER — KETOROLAC TROMETHAMINE 30 MG/ML
30 SYRINGE (ML) INJECTION EVERY 8 HOURS
Refills: 0 | Status: DISCONTINUED | OUTPATIENT
Start: 2020-03-18 | End: 2020-03-19

## 2020-03-18 RX ORDER — ACETAMINOPHEN 500 MG
1000 TABLET ORAL ONCE
Refills: 0 | Status: COMPLETED | OUTPATIENT
Start: 2020-03-18 | End: 2020-03-18

## 2020-03-18 RX ORDER — POLYETHYLENE GLYCOL 3350 17 G/17G
17 POWDER, FOR SOLUTION ORAL DAILY
Refills: 0 | Status: DISCONTINUED | OUTPATIENT
Start: 2020-03-18 | End: 2020-03-21

## 2020-03-18 RX ORDER — OXYCODONE HYDROCHLORIDE 5 MG/1
5 TABLET ORAL EVERY 4 HOURS
Refills: 0 | Status: DISCONTINUED | OUTPATIENT
Start: 2020-03-18 | End: 2020-03-23

## 2020-03-18 RX ORDER — FENTANYL CITRATE 50 UG/ML
25 INJECTION INTRAVENOUS ONCE
Refills: 0 | Status: DISCONTINUED | OUTPATIENT
Start: 2020-03-18 | End: 2020-03-18

## 2020-03-18 RX ORDER — MAGNESIUM SULFATE 500 MG/ML
1 VIAL (ML) INJECTION EVERY 12 HOURS
Refills: 0 | Status: DISCONTINUED | OUTPATIENT
Start: 2020-03-18 | End: 2020-03-23

## 2020-03-18 RX ORDER — ALBUMIN HUMAN 25 %
500 VIAL (ML) INTRAVENOUS ONCE
Refills: 0 | Status: COMPLETED | OUTPATIENT
Start: 2020-03-18 | End: 2020-03-18

## 2020-03-18 RX ORDER — NICARDIPINE HYDROCHLORIDE 30 MG/1
5 CAPSULE, EXTENDED RELEASE ORAL
Qty: 40 | Refills: 0 | Status: DISCONTINUED | OUTPATIENT
Start: 2020-03-18 | End: 2020-03-19

## 2020-03-18 RX ORDER — FAMOTIDINE 10 MG/ML
20 INJECTION INTRAVENOUS EVERY 12 HOURS
Refills: 0 | Status: DISCONTINUED | OUTPATIENT
Start: 2020-03-18 | End: 2020-03-19

## 2020-03-18 RX ORDER — ALBUTEROL 90 UG/1
2 AEROSOL, METERED ORAL EVERY 6 HOURS
Refills: 0 | Status: DISCONTINUED | OUTPATIENT
Start: 2020-03-18 | End: 2020-03-19

## 2020-03-18 RX ORDER — CHLORHEXIDINE GLUCONATE 213 G/1000ML
1 SOLUTION TOPICAL
Refills: 0 | Status: DISCONTINUED | OUTPATIENT
Start: 2020-03-18 | End: 2020-03-23

## 2020-03-18 RX ORDER — CHLORHEXIDINE GLUCONATE 213 G/1000ML
5 SOLUTION TOPICAL EVERY 4 HOURS
Refills: 0 | Status: DISCONTINUED | OUTPATIENT
Start: 2020-03-18 | End: 2020-03-19

## 2020-03-18 RX ORDER — CEFAZOLIN SODIUM 1 G
1000 VIAL (EA) INJECTION EVERY 8 HOURS
Refills: 0 | Status: COMPLETED | OUTPATIENT
Start: 2020-03-18 | End: 2020-03-19

## 2020-03-18 RX ORDER — NITROGLYCERIN 6.5 MG
5 CAPSULE, EXTENDED RELEASE ORAL
Qty: 50 | Refills: 0 | Status: DISCONTINUED | OUTPATIENT
Start: 2020-03-18 | End: 2020-03-19

## 2020-03-18 RX ORDER — PROPOFOL 10 MG/ML
10 INJECTION, EMULSION INTRAVENOUS
Qty: 1000 | Refills: 0 | Status: DISCONTINUED | OUTPATIENT
Start: 2020-03-18 | End: 2020-03-19

## 2020-03-18 RX ORDER — SODIUM CHLORIDE 9 MG/ML
1000 INJECTION INTRAMUSCULAR; INTRAVENOUS; SUBCUTANEOUS
Refills: 0 | Status: DISCONTINUED | OUTPATIENT
Start: 2020-03-18 | End: 2020-03-20

## 2020-03-18 RX ORDER — INSULIN HUMAN 100 [IU]/ML
1 INJECTION, SOLUTION SUBCUTANEOUS
Qty: 100 | Refills: 0 | Status: DISCONTINUED | OUTPATIENT
Start: 2020-03-18 | End: 2020-03-21

## 2020-03-18 RX ORDER — DEXTROSE 50 % IN WATER 50 %
50 SYRINGE (ML) INTRAVENOUS
Refills: 0 | Status: DISCONTINUED | OUTPATIENT
Start: 2020-03-18 | End: 2020-03-23

## 2020-03-18 RX ORDER — IPRATROPIUM BROMIDE 0.2 MG/ML
2 SOLUTION, NON-ORAL INHALATION EVERY 6 HOURS
Refills: 0 | Status: DISCONTINUED | OUTPATIENT
Start: 2020-03-18 | End: 2020-03-19

## 2020-03-18 RX ORDER — CHLORHEXIDINE GLUCONATE 213 G/1000ML
15 SOLUTION TOPICAL EVERY 12 HOURS
Refills: 0 | Status: DISCONTINUED | OUTPATIENT
Start: 2020-03-18 | End: 2020-03-19

## 2020-03-18 RX ADMIN — FENTANYL CITRATE 25 MICROGRAM(S): 50 INJECTION INTRAVENOUS at 17:50

## 2020-03-18 RX ADMIN — Medication 30 MILLIGRAM(S): at 22:20

## 2020-03-18 RX ADMIN — Medication 30 MILLIGRAM(S): at 14:23

## 2020-03-18 RX ADMIN — Medication 30 MILLIGRAM(S): at 23:44

## 2020-03-18 RX ADMIN — ATORVASTATIN CALCIUM 40 MILLIGRAM(S): 80 TABLET, FILM COATED ORAL at 22:20

## 2020-03-18 RX ADMIN — Medication 1500 MILLILITER(S): at 14:54

## 2020-03-18 RX ADMIN — Medication 250 MILLILITER(S): at 23:45

## 2020-03-18 RX ADMIN — CHLORHEXIDINE GLUCONATE 1 APPLICATION(S): 213 SOLUTION TOPICAL at 22:20

## 2020-03-18 RX ADMIN — Medication 30 MILLIGRAM(S): at 14:40

## 2020-03-18 RX ADMIN — Medication 100 MILLIEQUIVALENT(S): at 14:15

## 2020-03-18 RX ADMIN — Medication 100 MILLIGRAM(S): at 23:44

## 2020-03-18 RX ADMIN — FAMOTIDINE 20 MILLIGRAM(S): 10 INJECTION INTRAVENOUS at 17:42

## 2020-03-18 RX ADMIN — Medication 1000 MILLIGRAM(S): at 20:05

## 2020-03-18 RX ADMIN — CHLORHEXIDINE GLUCONATE 5 MILLILITER(S): 213 SOLUTION TOPICAL at 17:43

## 2020-03-18 RX ADMIN — FENTANYL CITRATE 25 MICROGRAM(S): 50 INJECTION INTRAVENOUS at 17:20

## 2020-03-18 RX ADMIN — Medication 400 MILLIGRAM(S): at 18:30

## 2020-03-18 RX ADMIN — CHLORHEXIDINE GLUCONATE 5 MILLILITER(S): 213 SOLUTION TOPICAL at 17:26

## 2020-03-18 RX ADMIN — OXYCODONE HYDROCHLORIDE 10 MILLIGRAM(S): 5 TABLET ORAL at 23:45

## 2020-03-18 RX ADMIN — Medication 250 MILLILITER(S): at 17:00

## 2020-03-18 RX ADMIN — Medication 100 MILLIGRAM(S): at 17:38

## 2020-03-18 RX ADMIN — CHLORHEXIDINE GLUCONATE 5 MILLILITER(S): 213 SOLUTION TOPICAL at 22:21

## 2020-03-18 RX ADMIN — CHLORHEXIDINE GLUCONATE 15 MILLILITER(S): 213 SOLUTION TOPICAL at 17:43

## 2020-03-18 RX ADMIN — CHLORHEXIDINE GLUCONATE 15 MILLILITER(S): 213 SOLUTION TOPICAL at 04:50

## 2020-03-18 RX ADMIN — INSULIN HUMAN 1 UNIT(S)/HR: 100 INJECTION, SOLUTION SUBCUTANEOUS at 20:06

## 2020-03-18 NOTE — DISCHARGE NOTE PROVIDER - CARE PROVIDERS DIRECT ADDRESSES
,DirectAddress_Unknown,[email or web address],DirectAddress_Unknown

## 2020-03-18 NOTE — BRIEF OPERATIVE NOTE - NSICDXBRIEFPROCEDURE_GEN_ALL_CORE_FT
PROCEDURES:  CABG, using radial artery graft 18-Mar-2020 14:27:51 LIMA to LAD, RSVG to DIAG, LRAD to August Lara

## 2020-03-18 NOTE — DISCHARGE NOTE PROVIDER - HOSPITAL COURSE
59y Male with PMH of DM, HTN, CAD, MI 10 years ago, s/p PCI stent x 2, with chest pressure and SOB at night for last 3-4 months, and an abnormal stress test, now presented for elective cardiac cath which revealed 2vCAD. He was admitted post catheterization for myocardial revascularization via CABG, which was performed on 3/18/20. His post op course was 59y Male with PMH of DM, HTN, CAD, MI 10 years ago, s/p PCI stent x 2, with chest pressure and SOB at night for last 3-4 months, and an abnormal stress test, now presented for elective cardiac cath which revealed 2vCAD. He was admitted post catheterization for myocardial revascularization via CABG, which was performed on 3/18/20. Post-operatively the patient had headache, altered mental status, and right homonymous hemianopsia. A Stroke Code was called and initial NIHSS 5 STAT CT head reports of no intracranial pathology and the patient's symptoms resolved.  The patient had an otherwise uncomplicated course.  The patient was started on DAPT as per Neuro recommendations for Mild stenosis of the right MCA proximal M1 segment and Moderate stenosis of the right PCA distal P2 segment as seen on CT head with contrast.  The patient was also started on Imdur for radial artery graft patency.  The patient will follow-up with Neurology as an outpatient with possible MRI Brain as an outpatient if recommended by Neuro.

## 2020-03-18 NOTE — DISCHARGE NOTE PROVIDER - NSDCCPCAREPLAN_GEN_ALL_CORE_FT
PRINCIPAL DISCHARGE DIAGNOSIS  Diagnosis: Coronary artery disease involving native heart with unstable angina pectoris  Assessment and Plan of Treatment:

## 2020-03-18 NOTE — DISCHARGE NOTE PROVIDER - NSDCFUADDAPPT_GEN_ALL_CORE_FT
follow up with Dr. Yadav on March   at Follow-up with Opthomology as an outpatient for further visual field testing

## 2020-03-18 NOTE — DISCHARGE NOTE PROVIDER - NSDCACTIVITY_GEN_ALL_CORE
Walking - Indoors allowed/Showering allowed/Do not make important decisions/Do not drive or operate machinery/Stairs allowed/No heavy lifting/straining/Walking - Outdoors allowed

## 2020-03-18 NOTE — PACU DISCHARGE NOTE - COMMENTS
s/p CABG x3 on pump. Post-op vitals bp 128/64, hr 93, rr 12, 100% O2, temp 97.4. PA 37/23, CVP 19, CO- 8.8, CI 4.4

## 2020-03-18 NOTE — DISCHARGE NOTE PROVIDER - PROVIDER TOKENS
FREE:[LAST:[[place]],FIRST:[[place]],PHONE:[phone]],FAX:[(   )    -],ADDRESS:[[address] [place] [address]],FOLLOWUP:[1 month]],PROVIDER:[TOKEN:[[id number]],FOLLOWUP:[1 month]],PROVIDER:[TOKEN:[[id number]],FOLLOWUP:[1 week]] PROVIDER:[TOKEN:[[id number]],FOLLOWUP:[2 weeks]],PROVIDER:[TOKEN:[[id number]],SCHEDULEDAPPT:[03/27/2020],SCHEDULEDAPPTTIME:[01:00 PM]],PROVIDER:[TOKEN:[[id number]],FOLLOWUP:[2 weeks]],FREE:[LAST:[[place]],FIRST:[[place]],PHONE:[phone]],FAX:[(   )    -],ADDRESS:[[address] [place] [address]  [place] [address]],FOLLOWUP:[1 month]]

## 2020-03-18 NOTE — DISCHARGE NOTE PROVIDER - NSDCMRMEDTOKEN_GEN_ALL_CORE_FT
aspirin 81 mg oral tablet: 1 tab(s) orally once a day  Lipitor 40 mg oral tablet: 1 tab(s) orally once a day  metFORMIN 500 mg oral tablet: 1 tab(s) orally 2 times a day  metoprolol succinate 25 mg oral tablet, extended release: 1 tab(s) orally once a day  Plavix 75 mg oral tablet: 1 tab(s) orally once a day acetaminophen 325 mg oral tablet: 2 tab(s) orally every 6 hours, As needed, Temp greater or equal to 38C (100.4F), Moderate Pain (4 - 6)  alcohol swabs : Apply topically to affected area 4 times a day   aspirin 325 mg oral tablet: 1 tab(s) orally once a day  atorvastatin 40 mg oral tablet: 1 tab(s) orally once a day (at bedtime)  famotidine 20 mg oral tablet: 1 tab(s) orally 2 times a day  glucometer (per patient&#x27;s insurance): Test blood sugars four times a day. Dispense #1 glucometer.  glucose tablets: 1 tab(s) chewed once a day, As Needed for low blood sugar  Insulin Pen Needles, 4mm: 1 application subcutaneously 4 times a day. ** Use with insulin pen **   isosorbide mononitrate 30 mg oral tablet, extended release: 1 tab(s) orally once a day  K-Tab 20 mEq oral tablet, extended release: 1 tab(s) orally once a day   lancets: 1 application subcutaneously 4 times a day   Lantus Solostar Pen 100 units/mL subcutaneous solution: 30 unit(s) subcutaneous once a day   Lasix 40 mg oral tablet: 1 tab(s) orally once a day   melatonin 3 mg oral tablet: 1 tab(s) orally once a day (at bedtime)  metFORMIN 500 mg oral tablet: 1 tab(s) orally 2 times a day  metoprolol tartrate 25 mg oral tablet: 1 tab(s) orally 2 times a day  NovoLOG FlexPen 100 units/mL injectable solution: 10 unit(s) subcutaneous 3 times a day (with meals)   oxycodone-acetaminophen 5 mg-325 mg oral tablet: 2 tab(s) orally every 6 hours, As Needed -for moderate pain MDD:8   Plavix 75 mg oral tablet: 1 tab(s) orally once a day  tamsulosin 0.4 mg oral capsule: 1 cap(s) orally once a day (at bedtime)  test strips (per patient&#x27;s insurance): 1 application subcutaneously 4 times a day. ** Compatible with patient&#x27;s glucometer **

## 2020-03-18 NOTE — DISCHARGE NOTE PROVIDER - CARE PROVIDER_API CALL
Sultan Afshan  [address] [facility]  [facility] [address]  Phone: [phone]  Fax: (   )    -  Follow Up Time: 1 month    Edward Paez)  Cardiovascular Disease; Nuclear Cardiology  [address] [place] [address], [address]  [place] [address]  Phone: [phone]  Fax: [phone]  Follow Up Time: 1 month    Fredi Yadav)  Thoracic and Cardiac Surgery  [address] [place] [address], [address]  [place] [address]  Phone: [phone]  Fax: [phone]  Follow Up Time: 1 week Edward Paez)  Cardiovascular Disease; Nuclear Cardiology  [address] [facility], [address]  [place] [address]  Phone: [phone]  Fax: [phone]  Follow Up Time: 2 weeks    Fredi Yadav)  Thoracic and Cardiac Surgery  [address] [facility], [address]  [place] [address]  Phone: [phone]  Fax: [phone]  Scheduled Appointment: 03/27/2020 01:00 PM    Nikhil Ann)  Neurology  [address] [place] [address], [address]  [place] [address]  Phone: [phone]  Fax: [phone]  Follow Up Time: 2 weeks    Sultan Afshan  [address] [place] [address]  [facility] [address]  Phone: [phone]  Fax: (   )    -  Follow Up Time: 1 month

## 2020-03-19 LAB
ALBUMIN SERPL ELPH-MCNC: 4.8 G/DL — SIGNIFICANT CHANGE UP (ref 3.5–5.2)
ALP SERPL-CCNC: 28 U/L — LOW (ref 30–115)
ALT FLD-CCNC: 30 U/L — SIGNIFICANT CHANGE UP (ref 0–41)
ANION GAP SERPL CALC-SCNC: 13 MMOL/L — SIGNIFICANT CHANGE UP (ref 7–14)
APTT BLD: 32.6 SEC — SIGNIFICANT CHANGE UP (ref 27–39.2)
AST SERPL-CCNC: 49 U/L — HIGH (ref 0–41)
BASOPHILS # BLD AUTO: 0.01 K/UL — SIGNIFICANT CHANGE UP (ref 0–0.2)
BASOPHILS NFR BLD AUTO: 0.1 % — SIGNIFICANT CHANGE UP (ref 0–1)
BILIRUB SERPL-MCNC: 2.9 MG/DL — HIGH (ref 0.2–1.2)
BUN SERPL-MCNC: 10 MG/DL — SIGNIFICANT CHANGE UP (ref 10–20)
CALCIUM SERPL-MCNC: 8 MG/DL — LOW (ref 8.5–10.1)
CHLORIDE SERPL-SCNC: 108 MMOL/L — SIGNIFICANT CHANGE UP (ref 98–110)
CO2 SERPL-SCNC: 20 MMOL/L — SIGNIFICANT CHANGE UP (ref 17–32)
CREAT SERPL-MCNC: 0.9 MG/DL — SIGNIFICANT CHANGE UP (ref 0.7–1.5)
EOSINOPHIL # BLD AUTO: 0.02 K/UL — SIGNIFICANT CHANGE UP (ref 0–0.7)
EOSINOPHIL NFR BLD AUTO: 0.3 % — SIGNIFICANT CHANGE UP (ref 0–8)
GLUCOSE BLDC GLUCOMTR-MCNC: 119 MG/DL — HIGH (ref 70–99)
GLUCOSE BLDC GLUCOMTR-MCNC: 119 MG/DL — HIGH (ref 70–99)
GLUCOSE BLDC GLUCOMTR-MCNC: 121 MG/DL — HIGH (ref 70–99)
GLUCOSE BLDC GLUCOMTR-MCNC: 129 MG/DL — HIGH (ref 70–99)
GLUCOSE BLDC GLUCOMTR-MCNC: 129 MG/DL — HIGH (ref 70–99)
GLUCOSE BLDC GLUCOMTR-MCNC: 136 MG/DL — HIGH (ref 70–99)
GLUCOSE BLDC GLUCOMTR-MCNC: 137 MG/DL — HIGH (ref 70–99)
GLUCOSE BLDC GLUCOMTR-MCNC: 158 MG/DL — HIGH (ref 70–99)
GLUCOSE BLDC GLUCOMTR-MCNC: 162 MG/DL — HIGH (ref 70–99)
GLUCOSE BLDC GLUCOMTR-MCNC: 170 MG/DL — HIGH (ref 70–99)
GLUCOSE BLDC GLUCOMTR-MCNC: 97 MG/DL — SIGNIFICANT CHANGE UP (ref 70–99)
GLUCOSE SERPL-MCNC: 130 MG/DL — HIGH (ref 70–99)
HCT VFR BLD CALC: 27.7 % — LOW (ref 42–52)
HGB BLD-MCNC: 9.3 G/DL — LOW (ref 14–18)
IMM GRANULOCYTES NFR BLD AUTO: 0.4 % — HIGH (ref 0.1–0.3)
INR BLD: 1.22 RATIO — SIGNIFICANT CHANGE UP (ref 0.65–1.3)
LYMPHOCYTES # BLD AUTO: 1.32 K/UL — SIGNIFICANT CHANGE UP (ref 1.2–3.4)
LYMPHOCYTES # BLD AUTO: 18.5 % — LOW (ref 20.5–51.1)
MAGNESIUM SERPL-MCNC: 2.1 MG/DL — SIGNIFICANT CHANGE UP (ref 1.8–2.4)
MCHC RBC-ENTMCNC: 30.1 PG — SIGNIFICANT CHANGE UP (ref 27–31)
MCHC RBC-ENTMCNC: 33.6 G/DL — SIGNIFICANT CHANGE UP (ref 32–37)
MCV RBC AUTO: 89.6 FL — SIGNIFICANT CHANGE UP (ref 80–94)
MONOCYTES # BLD AUTO: 0.57 K/UL — SIGNIFICANT CHANGE UP (ref 0.1–0.6)
MONOCYTES NFR BLD AUTO: 8 % — SIGNIFICANT CHANGE UP (ref 1.7–9.3)
NEUTROPHILS # BLD AUTO: 5.19 K/UL — SIGNIFICANT CHANGE UP (ref 1.4–6.5)
NEUTROPHILS NFR BLD AUTO: 72.7 % — SIGNIFICANT CHANGE UP (ref 42.2–75.2)
NRBC # BLD: 0 /100 WBCS — SIGNIFICANT CHANGE UP (ref 0–0)
PLATELET # BLD AUTO: 94 K/UL — LOW (ref 130–400)
POTASSIUM SERPL-MCNC: 4.4 MMOL/L — SIGNIFICANT CHANGE UP (ref 3.5–5)
POTASSIUM SERPL-SCNC: 4.4 MMOL/L — SIGNIFICANT CHANGE UP (ref 3.5–5)
PROT SERPL-MCNC: 5.8 G/DL — LOW (ref 6–8)
PROTHROM AB SERPL-ACNC: 14 SEC — HIGH (ref 9.95–12.87)
RBC # BLD: 3.09 M/UL — LOW (ref 4.7–6.1)
RBC # FLD: 12.8 % — SIGNIFICANT CHANGE UP (ref 11.5–14.5)
SODIUM SERPL-SCNC: 141 MMOL/L — SIGNIFICANT CHANGE UP (ref 135–146)
WBC # BLD: 7.14 K/UL — SIGNIFICANT CHANGE UP (ref 4.8–10.8)
WBC # FLD AUTO: 7.14 K/UL — SIGNIFICANT CHANGE UP (ref 4.8–10.8)

## 2020-03-19 PROCEDURE — 71045 X-RAY EXAM CHEST 1 VIEW: CPT | Mod: 26,77

## 2020-03-19 PROCEDURE — 99291 CRITICAL CARE FIRST HOUR: CPT

## 2020-03-19 PROCEDURE — 93010 ELECTROCARDIOGRAM REPORT: CPT

## 2020-03-19 PROCEDURE — 71045 X-RAY EXAM CHEST 1 VIEW: CPT | Mod: 26

## 2020-03-19 RX ORDER — FUROSEMIDE 40 MG
40 TABLET ORAL ONCE
Refills: 0 | Status: COMPLETED | OUTPATIENT
Start: 2020-03-19 | End: 2020-03-19

## 2020-03-19 RX ORDER — IPRATROPIUM/ALBUTEROL SULFATE 18-103MCG
3 AEROSOL WITH ADAPTER (GRAM) INHALATION EVERY 6 HOURS
Refills: 0 | Status: DISCONTINUED | OUTPATIENT
Start: 2020-03-19 | End: 2020-03-23

## 2020-03-19 RX ORDER — ALBUMIN HUMAN 25 %
500 VIAL (ML) INTRAVENOUS ONCE
Refills: 0 | Status: COMPLETED | OUTPATIENT
Start: 2020-03-19 | End: 2020-03-19

## 2020-03-19 RX ORDER — FAMOTIDINE 10 MG/ML
20 INJECTION INTRAVENOUS
Refills: 0 | Status: DISCONTINUED | OUTPATIENT
Start: 2020-03-19 | End: 2020-03-23

## 2020-03-19 RX ORDER — VASOPRESSIN 20 [USP'U]/ML
0.03 INJECTION INTRAVENOUS
Qty: 50 | Refills: 0 | Status: DISCONTINUED | OUTPATIENT
Start: 2020-03-19 | End: 2020-03-20

## 2020-03-19 RX ORDER — FENTANYL CITRATE 50 UG/ML
25 INJECTION INTRAVENOUS ONCE
Refills: 0 | Status: DISCONTINUED | OUTPATIENT
Start: 2020-03-19 | End: 2020-03-19

## 2020-03-19 RX ORDER — ASPIRIN/CALCIUM CARB/MAGNESIUM 324 MG
325 TABLET ORAL DAILY
Refills: 0 | Status: DISCONTINUED | OUTPATIENT
Start: 2020-03-19 | End: 2020-03-20

## 2020-03-19 RX ORDER — HEPARIN SODIUM 5000 [USP'U]/ML
5000 INJECTION INTRAVENOUS; SUBCUTANEOUS EVERY 12 HOURS
Refills: 0 | Status: DISCONTINUED | OUTPATIENT
Start: 2020-03-19 | End: 2020-03-22

## 2020-03-19 RX ORDER — BENZOCAINE 10 %
1 GEL (GRAM) MUCOUS MEMBRANE EVERY 4 HOURS
Refills: 0 | Status: DISCONTINUED | OUTPATIENT
Start: 2020-03-19 | End: 2020-03-21

## 2020-03-19 RX ORDER — ACETAMINOPHEN 500 MG
650 TABLET ORAL ONCE
Refills: 0 | Status: COMPLETED | OUTPATIENT
Start: 2020-03-19 | End: 2020-03-19

## 2020-03-19 RX ADMIN — OXYCODONE HYDROCHLORIDE 10 MILLIGRAM(S): 5 TABLET ORAL at 23:00

## 2020-03-19 RX ADMIN — FENTANYL CITRATE 25 MICROGRAM(S): 50 INJECTION INTRAVENOUS at 11:35

## 2020-03-19 RX ADMIN — OXYCODONE HYDROCHLORIDE 10 MILLIGRAM(S): 5 TABLET ORAL at 08:30

## 2020-03-19 RX ADMIN — Medication 30 MILLIGRAM(S): at 05:53

## 2020-03-19 RX ADMIN — FENTANYL CITRATE 25 MICROGRAM(S): 50 INJECTION INTRAVENOUS at 11:20

## 2020-03-19 RX ADMIN — FAMOTIDINE 20 MILLIGRAM(S): 10 INJECTION INTRAVENOUS at 05:53

## 2020-03-19 RX ADMIN — INSULIN HUMAN 1 UNIT(S)/HR: 100 INJECTION, SOLUTION SUBCUTANEOUS at 05:37

## 2020-03-19 RX ADMIN — OXYCODONE HYDROCHLORIDE 10 MILLIGRAM(S): 5 TABLET ORAL at 00:41

## 2020-03-19 RX ADMIN — Medication 30 MILLIGRAM(S): at 13:10

## 2020-03-19 RX ADMIN — Medication 650 MILLIGRAM(S): at 21:00

## 2020-03-19 RX ADMIN — Medication 3 MILLILITER(S): at 14:09

## 2020-03-19 RX ADMIN — CHLORHEXIDINE GLUCONATE 15 MILLILITER(S): 213 SOLUTION TOPICAL at 05:38

## 2020-03-19 RX ADMIN — Medication 100 GRAM(S): at 05:53

## 2020-03-19 RX ADMIN — HEPARIN SODIUM 5000 UNIT(S): 5000 INJECTION INTRAVENOUS; SUBCUTANEOUS at 17:31

## 2020-03-19 RX ADMIN — OXYCODONE HYDROCHLORIDE 10 MILLIGRAM(S): 5 TABLET ORAL at 03:56

## 2020-03-19 RX ADMIN — Medication 600 MILLIGRAM(S): at 17:33

## 2020-03-19 RX ADMIN — Medication 100 GRAM(S): at 17:32

## 2020-03-19 RX ADMIN — FAMOTIDINE 20 MILLIGRAM(S): 10 INJECTION INTRAVENOUS at 17:31

## 2020-03-19 RX ADMIN — ATORVASTATIN CALCIUM 40 MILLIGRAM(S): 80 TABLET, FILM COATED ORAL at 22:01

## 2020-03-19 RX ADMIN — Medication 30 MILLIGRAM(S): at 13:25

## 2020-03-19 RX ADMIN — OXYCODONE HYDROCHLORIDE 10 MILLIGRAM(S): 5 TABLET ORAL at 18:00

## 2020-03-19 RX ADMIN — Medication 1 SPRAY(S): at 20:52

## 2020-03-19 RX ADMIN — OXYCODONE HYDROCHLORIDE 10 MILLIGRAM(S): 5 TABLET ORAL at 22:01

## 2020-03-19 RX ADMIN — Medication 30 MILLIGRAM(S): at 06:30

## 2020-03-19 RX ADMIN — POLYETHYLENE GLYCOL 3350 17 GRAM(S): 17 POWDER, FOR SOLUTION ORAL at 11:23

## 2020-03-19 RX ADMIN — OXYCODONE HYDROCHLORIDE 10 MILLIGRAM(S): 5 TABLET ORAL at 08:00

## 2020-03-19 RX ADMIN — OXYCODONE HYDROCHLORIDE 10 MILLIGRAM(S): 5 TABLET ORAL at 17:31

## 2020-03-19 RX ADMIN — Medication 40 MILLIGRAM(S): at 09:00

## 2020-03-19 RX ADMIN — CHLORHEXIDINE GLUCONATE 1 APPLICATION(S): 213 SOLUTION TOPICAL at 23:00

## 2020-03-19 RX ADMIN — Medication 1 SPRAY(S): at 23:24

## 2020-03-19 RX ADMIN — OXYCODONE HYDROCHLORIDE 10 MILLIGRAM(S): 5 TABLET ORAL at 04:30

## 2020-03-19 RX ADMIN — Medication 650 MILLIGRAM(S): at 20:00

## 2020-03-19 RX ADMIN — Medication 325 MILLIGRAM(S): at 11:23

## 2020-03-19 RX ADMIN — Medication 100 MILLIGRAM(S): at 08:43

## 2020-03-19 RX ADMIN — Medication 250 MILLILITER(S): at 03:57

## 2020-03-19 RX ADMIN — VASOPRESSIN 2 UNIT(S)/MIN: 20 INJECTION INTRAVENOUS at 05:37

## 2020-03-19 NOTE — PHYSICAL THERAPY INITIAL EVALUATION ADULT - GENERAL OBSERVATIONS, REHAB EVAL
pt encountered supine in bed in NAD, nursing staff helping, pt agreeable to PT IE, + telemetry, webber, 2 chest tubes, pulse ox, 3 Lo2, IV

## 2020-03-19 NOTE — PHYSICAL THERAPY INITIAL EVALUATION ADULT - LEVEL OF INDEPENDENCE: GAIT, REHAB EVAL
pt ambulates with a slow, shuffling, anxious, gait pattern - verbal cues for proper performance/contact guard

## 2020-03-19 NOTE — PROGRESS NOTE ADULT - SUBJECTIVE AND OBJECTIVE BOX
OPERATIVE PROCEDURE(s):     CABG3 left radial harvest           POD #  1                     SURGEON(s): TONY Paez  SUBJECTIVE ASSESSMENT: 59y Male patient seen and examined at bedside. Patient denies any acute complaints at this time.     Vital Signs Last 24 Hrs  T(F): 99.1 (19 Mar 2020 12:00), Max: 100.7 (18 Mar 2020 20:00)  HR: 84 (19 Mar 2020 13:00) (77 - 93)  BP: 127/74 (19 Mar 2020 07:00) (100/61 - 134/82)  BP(mean): 95 (19 Mar 2020 07:00) (74 - 103)  ABP: 100/53 (19 Mar 2020 13:00) (85/47 - 137/80)  ABP(mean): 70 (19 Mar 2020 13:00)  RR: 19 (19 Mar 2020 13:00) (0 - 46)  SpO2: 99% (19 Mar 2020 13:00) (94% - 100%)  CVP(mm Hg): 15 (19 Mar 2020 09:00)  CVP(cm H2O): --  CO: 6.9 (19 Mar 2020 08:00)  CI: 3.4 (19 Mar 2020 08:00)  PA: 45/13 (19 Mar 2020 09:00)  SVR: 752 (19 Mar 2020 08:00)  Mode: CPAP with PS  FiO2: 40  PEEP: 5  PS: 5  MAP: 7.4    I&O's Detail    18 Mar 2020 07:01  -  19 Mar 2020 07:00  --------------------------------------------------------  IN:    Albumin 5%  - 500 mL: 2500 mL    dexmedetomidine Infusion: 41 mL    insulin regular Infusion: 40 mL    IV PiggyBack: 300 mL    niCARdipine Infusion: 30 mL    nitroglycerin  Infusion: 84 mL    norepinephrine Infusion: 45 mL    Oral Fluid: 1200 mL    sodium chloride 0.9%.: 340 mL    vasopressin Infusion: 6 mL  Total IN: 4586 mL    OUT:    Chest Tube: 83 mL    Chest Tube: 530 mL    Indwelling Catheter - Urethral: 1160 mL  Total OUT: 1773 mL    Net: I&O's Detail    17 Mar 2020 07:01  -  18 Mar 2020 07:00  --------------------------------------------------------  Total NET: -259 mL    18 Mar 2020 07:01  -  19 Mar 2020 07:00  --------------------------------------------------------  Total NET: 2813 mL      CAPILLARY BLOOD GLUCOSE  162 (19 Mar 2020 12:00)  170 (19 Mar 2020 10:00)  136 (19 Mar 2020 08:00)  124 (18 Mar 2020 20:00)  POCT Blood Glucose.: 162 mg/dL (19 Mar 2020 12:21)  POCT Blood Glucose.: 170 mg/dL (19 Mar 2020 10:11)  POCT Blood Glucose.: 136 mg/dL (19 Mar 2020 07:58)  POCT Blood Glucose.: 137 mg/dL (19 Mar 2020 06:03)  POCT Blood Glucose.: 119 mg/dL (19 Mar 2020 04:00)  POCT Blood Glucose.: 129 mg/dL (19 Mar 2020 02:08)  POCT Blood Glucose.: 105 mg/dL (18 Mar 2020 23:52)  POCT Blood Glucose.: 118 mg/dL (18 Mar 2020 21:41)  POCT Blood Glucose.: 121 mg/dL (18 Mar 2020 18:02)  POCT Blood Glucose.: 79 mg/dL (18 Mar 2020 15:50)  POCT Blood Glucose.: 58 mg/dL (18 Mar 2020 15:08)  POCT Blood Glucose.: 104 mg/dL (18 Mar 2020 13:46)      Physical Exam:  General: NAD; A&Ox3  Cardiac: S1/S2, RRR, no murmur, no rubs  Lungs: unlabored respirations, CTA b/l, no wheeze, no rales, no crackles  Abdomen: Soft/NT/ND; positive bowel sounds x 4  Sternum: Intact, no click, incision healing well with no drainage  Incisions: Incisions clean/dry/intact  Extremities: No edema b/l lower extremities; good capillary refill; no cyanosis; palpable 1+ pedal pulses b/l    Central Venous Catheter: Yes[x]  No[] , If Yes indication:  IV Access                   Day # 1  Spencer Catheter: Yes  [x] , No  [] , If yes indication: Monitor strict in/out              Day # 1  EPICARDIAL WIRES:  [x] YES [] NO                                                            Day #  BOWEL MOVEMENT:  [] YES [x] NO, If No, Timing since last BM Day #  CHEST TUBE(Left/Right):  [x] YES [] NO, If yes -  AIR LEAKS:  [] YES [x] NO        LABS:                        9.3<L>  7.14  )-----------( 94<L>    ( 19 Mar 2020 02:00 )             27.7<L>                        10.5<L>  8.92  )-----------( 101<L>    ( 18 Mar 2020 13:25 )             30.0<L>    03-19    141  |  108  |  10  ----------------------------<  130<H>  4.4   |  20  |  0.9  03-18    145  |  109  |  9<L>  ----------------------------<  133<H>  3.5   |  24  |  0.9    Ca    8.0<L>      19 Mar 2020 02:00  Mg     2.1     03-19    TPro  5.8<L> [6.0 - 8.0]  /  Alb  4.8 [3.5 - 5.2]  /  TBili  2.9<H> [0.2 - 1.2]  /  DBili  x   /  AST  49<H> [0 - 41]  /  ALT  30 [0 - 41]  /  AlkPhos  28<L> [30 - 115]  03-19    PT/INR - ( 19 Mar 2020 02:00 )   PT: ;   INR: 1.22 ratio       PT/INR - ( 18 Mar 2020 13:25 )   PT: ;   INR: 1.16 ratio       PTT - ( 19 Mar 2020 02:00 )  PTT:32.6 sec, PTT - ( 18 Mar 2020 13:25 )  PTT:25.0 sec    ABG - ( 19 Mar 2020 04:27 )  pH: 7.36  /  pCO2: 38    /  pO2: 86    / HCO3: 22    / Base Excess: -3.5  /  SaO2: 98    /  LA: 0.7        RADIOLOGY & ADDITIONAL TESTS:  CXR: < from: Xray Chest 1 View- PORTABLE-Routine (03.19.20 @ 05:46) >  Impression:  Left basilar opacity/effusion  < end of copied text >    EKG: < from: 12 Lead ECG (03.19.20 @ 08:14) >  Ventricular Rate 84 BPM  Atrial Rate 84 BPM  P-R Interval 146 ms  QRS Duration 140 ms  Q-T Interval 376 ms  QTC Calculation(Bezet) 444 ms  P Axis 46 degrees  R Axis 69 degrees  T Axis 8 degrees  Diagnosis Line Normal sinus rhythm  Right bundle branch block  Abnormal ECG  Confirmed by Isaías Miller ([id number]) on 3/19/2020 9:20:29 AM  < end of copied text >      Allergies  No Known Allergies  Intolerances      MEDICATIONS  (STANDING):  albuterol/ipratropium for Nebulization 3 milliLiter(s) Nebulizer every 6 hours  aspirin 325 milliGRAM(s) Oral daily  atorvastatin 40 milliGRAM(s) Oral at bedtime  chlorhexidine 4% Liquid 1 Application(s) Topical <User Schedule>  dextrose 50% Injectable 50 milliLiter(s) IV Push every 15 minutes  dextrose 50% Injectable 25 milliLiter(s) IV Push every 15 minutes  famotidine    Tablet 20 milliGRAM(s) Oral two times a day  guaiFENesin  milliGRAM(s) Oral every 12 hours  heparin  Injectable 5000 Unit(s) SubCutaneous every 12 hours  influenza   Vaccine 0.5 milliLiter(s) IntraMuscular once  insulin regular Infusion 1 Unit(s)/Hr (1 mL/Hr) IV Continuous <Continuous>  ketorolac   Injectable 30 milliGRAM(s) IV Push every 8 hours  magnesium sulfate  IVPB 1 Gram(s) IV Intermittent every 12 hours  norepinephrine Infusion 0.05 MICROgram(s)/kG/Min (7.74 mL/Hr) IV Continuous <Continuous>  polyethylene glycol 3350 17 Gram(s) Oral daily  sodium chloride 0.9%. 1000 milliLiter(s) (20 mL/Hr) IV Continuous <Continuous>  vasopressin Infusion 0.033 Unit(s)/Min (2 mL/Hr) IV Continuous <Continuous>    MEDICATIONS  (PRN):  glucagon  Injectable 1 milliGRAM(s) IntraMuscular once PRN Glucose LESS THAN 70 milligrams/deciliter  ondansetron  IVPB 4 milliGRAM(s) IV Intermittent once PRN Nausea and/or Vomiting  oxyCODONE    IR 10 milliGRAM(s) Oral every 4 hours PRN Severe Pain (7 - 10)  oxyCODONE    IR 5 milliGRAM(s) Oral every 4 hours PRN Moderate Pain (4 - 6)    Home Medications:  aspirin 81 mg oral tablet: 1 tab(s) orally once a day (16 Mar 2020 10:33)  Lipitor 40 mg oral tablet: 1 tab(s) orally once a day (16 Mar 2020 10:33)  metFORMIN 500 mg oral tablet: 1 tab(s) orally 2 times a day (16 Mar 2020 10:33)  metoprolol succinate 25 mg oral tablet, extended release: 1 tab(s) orally once a day (16 Mar 2020 10:33)  Plavix 75 mg oral tablet: 1 tab(s) orally once a day (16 Mar 2020 10:33)    Post-Op Beta-Blockers: [] Yes, [x] No: contraindication: symptomatic hypotension on levo/vaso  Post-Op Nitrate: [] Yes, [x] No: contraindication: symptomatic hypotension on levo/vaso  Post-Op Aspirin:  [x] Yes, [] No: contraindication:  Post-Op Statin:  [x] Yes, [] No: contraindication:    Ambulation/Activity Status: OOB/AMB    Assessment/Plan:  59y Male status-post  - Case and plan discussed with CTU Intensivist and CT Surgeon - Dr. Yadav/Keyur/Gorge   - Continue CTU supportive care and ongoing plan of care as per continuing CTU rounds.   - Continue DVT/GI prophylaxis  - Incentive Spirometry 10 times an hour  - Continue to advance physical activity as tolerated and continue PT/OT as directed  1. CAD: Continue ASA and statin. BB and nitrate on hold for symptomatic hypotension on levo/vaso    Family member (sister) spoken to at length about patient's improving condition and plan of care by CT surgeon.

## 2020-03-19 NOTE — PHYSICAL THERAPY INITIAL EVALUATION ADULT - DID THE PATIENT HAVE SURGERY?
yes/CABG, using radial artery graft 18-Mar-2020 14:27:51 LIMA to LAD, RSVG to DIAG, LRAD to OM August Crump

## 2020-03-19 NOTE — PROGRESS NOTE ADULT - SUBJECTIVE AND OBJECTIVE BOX
CTU Attending Progress Daily Note     19 Mar 2020 08:39    Procedure:               CABG                                   POD#               2    Patient seen as post-op critical care follow-up    HPI:  59 yrs old with Hx of CAD, D , DL, HTN is here for elective LHC. Patient c/o progressive chest pain on exertion.  Pt had abnormal stress test. (16 Mar 2020 10:28)    See preop testing chart H&P    Interval event for past 24 hr:  FATIMAH EASTMAN  59y had hypotension on pressors    Current Complains:  FATIMAH EASTMAN has no new complaints    REVIEW OF SYSTEMS:  CONSTITUTIONAL:  [-] weakness, [-] fevers, [-] chills  EYES/ENT: [-] visual changes, [-] vertigo, [-] throat pain   NECK: [-] pain, [-] stiffness  RESPIRATORY: [-] cough, [-] wheezing, [-] hemoptysis, [-] shortness of breath  CARDIOVASCULAR: [-] chest pain, [-] palpitations, [-] orthopnea  GASTROINTESTINAL:    [-]abdominal pain, [-] nausea, [-] vomiting, [-] hematemesis, [-] diarrhea, [-] constipation, [-] melena, [-] hematochezia.  GENITOURINARY: [-] dysuria, [-] frequency, [-] hematuria  NEUROLOGICAL: [-] numbness, [-] weakness  SKIN: [-] itching, [-] burning, [-] rashes, [-] lesions   All other review of systems is negative unless indicated above.    [  ] Unable to assess ROS because :    OBJECTIVE:  ICU Vital Signs Last 24 Hrs  T(C): 37.7 (19 Mar 2020 08:00), Max: 38.2 (18 Mar 2020 20:00)  T(F): 99.9 (19 Mar 2020 08:00), Max: 100.7 (18 Mar 2020 20:00)  HR: 81 (19 Mar 2020 08:30) (77 - 93)  BP: 127/74 (19 Mar 2020 07:00) (100/61 - 134/82)  BP(mean): 95 (19 Mar 2020 07:00) (74 - 103)  ABP: 104/60 (19 Mar 2020 08:30) (85/47 - 141/67)  ABP(mean): 78 (19 Mar 2020 08:30) (60 - 103)  RR: 29 (19 Mar 2020 08:15) (0 - 46)  SpO2: 96% (19 Mar 2020 08:30) (94% - 100%)      I&O's Summary    18 Mar 2020 07:01  -  19 Mar 2020 07:00  --------------------------------------------------------  IN: 4586 mL / OUT: 1773 mL / NET: 2813 mL    19 Mar 2020 07:01  -  19 Mar 2020 08:39  --------------------------------------------------------  IN: 27 mL / OUT: 34 mL / NET: -7 mL      Adult Advanced Hemodynamics Last 24 Hrs  CVP(mm Hg): 15 (19 Mar 2020 08:30) (9 - 26)  CVP(cm H2O): --  CO: 6.9 (19 Mar 2020 08:00) (5.5 - 9.2)  CI: 3.4 (19 Mar 2020 08:00) (2.7 - 4.5)  PA: 40/17 (19 Mar 2020 08:30) (28/17 - 64/31)  PA(mean): 27 (19 Mar 2020 08:30) (21 - 66)  PCWP: --  SVR: 752 (19 Mar 2020 08:00) (626 - 1053)  SVRI: 1527 (19 Mar 2020 08:00) (1252 - 2172)  PVR: --  PVRI: --  Mode: CPAP with PS  FiO2: 40  PEEP: 5  PS: 5  ITime: 1  MAP: 7.4  PIP: 11      PHYSICAL EXAM:  General: WN/WD NAD    HEENT:     [+] NCAT  [+] EOMI  [-] Conjuctival edema   [-] Icterus   [-] Thrush   [-] ETT  [-] NGT/OGT    Neck:         [+] FROM   [-] JVD     [-] Nodes     [-] Masses    [+] Mid-line trachea    [-] Tracheostomy    Chest:         [-] Sternal click   [-] Sternal drainage   [+] Pacing wires   [+] Chest tubes   [-] SubQ emphysema    Lungs:          [+] CTA   [-] Rhonchi   [-] Rales    [-] Wheezing    [-] Decreased BS    [-] Dullness R L    Cardiac:       [+] S1 [+] S2    [+] RRR   [-] Irregular   [-] S3   [-] S4    [-] Murmurs    [-] Rub    Abdomen:    [+] BS    [+] Soft    [+] Non-tender     [-] Distended    [-] Organomegaly  [-] PEG    Extremities:   [-] Cyanosis U/L   [-] Clubbing  U/L  [-] LE/UE Edema   [+] Capillary refill    [+] Pulses     Neuro:        [+] Awake   [+]  Alert   [-] Confused   [-] Lethargic   [-] Sedated   [-] Generalized Weakness    Skin:        [-] Rashes    [-] Erythema   [+] Normal incisions   [+] IV sites intact   [-] Sacral decubitus    Tubes:  LINES:    CAPILLARY BLOOD GLUCOSE  136 (19 Mar 2020 08:00)      POCT Blood Glucose.: 136 mg/dL (19 Mar 2020 07:58)    CAPILLARY BLOOD GLUCOSE  136 (19 Mar 2020 08:00)  124 (18 Mar 2020 20:00)      POCT Blood Glucose.: 136 mg/dL (19 Mar 2020 07:58)  POCT Blood Glucose.: 137 mg/dL (19 Mar 2020 06:03)  POCT Blood Glucose.: 119 mg/dL (19 Mar 2020 04:00)  POCT Blood Glucose.: 129 mg/dL (19 Mar 2020 02:08)  POCT Blood Glucose.: 105 mg/dL (18 Mar 2020 23:52)  POCT Blood Glucose.: 118 mg/dL (18 Mar 2020 21:41)  POCT Blood Glucose.: 121 mg/dL (18 Mar 2020 18:02)  POCT Blood Glucose.: 79 mg/dL (18 Mar 2020 15:50)  POCT Blood Glucose.: 58 mg/dL (18 Mar 2020 15:08)  POCT Blood Glucose.: 104 mg/dL (18 Mar 2020 13:46)      HOSPITAL MEDICATIONS:  MEDICATIONS  (STANDING):  ALBUTerol    90 MICROgram(s) HFA Inhaler 2 Puff(s) Inhalation every 6 hours  atorvastatin 40 milliGRAM(s) Oral at bedtime  ceFAZolin   IVPB 1000 milliGRAM(s) IV Intermittent every 8 hours  chlorhexidine 0.12% Liquid 5 milliLiter(s) Oral Mucosa every 4 hours  chlorhexidine 0.12% Liquid 15 milliLiter(s) Oral Mucosa every 12 hours  chlorhexidine 4% Liquid 1 Application(s) Topical <User Schedule>  dexMEDEtomidine Infusion 0.25 MICROgram(s)/kG/Hr (5.16 mL/Hr) IV Continuous <Continuous>  dextrose 5%. 1000 milliLiter(s) (50 mL/Hr) IV Continuous <Continuous>  dextrose 50% Injectable 12.5 Gram(s) IV Push once  dextrose 50% Injectable 25 Gram(s) IV Push once  dextrose 50% Injectable 25 Gram(s) IV Push once  dextrose 50% Injectable 50 milliLiter(s) IV Push every 15 minutes  dextrose 50% Injectable 25 milliLiter(s) IV Push every 15 minutes  famotidine Injectable 20 milliGRAM(s) IV Push every 12 hours  heparin  Infusion 900 Unit(s)/Hr (9 mL/Hr) IV Continuous <Continuous>  influenza   Vaccine 0.5 milliLiter(s) IntraMuscular once  insulin lispro (HumaLOG) corrective regimen sliding scale   SubCutaneous three times a day before meals  insulin regular Infusion 2 Unit(s)/Hr (2 mL/Hr) IV Continuous <Continuous>  insulin regular Infusion 1 Unit(s)/Hr (1 mL/Hr) IV Continuous <Continuous>  ipratropium 17 MICROgram(s) HFA Inhaler 2 Puff(s) Inhalation every 6 hours  ketorolac   Injectable 30 milliGRAM(s) IV Push every 8 hours  magnesium sulfate  IVPB 1 Gram(s) IV Intermittent every 12 hours  meperidine     Injectable 25 milliGRAM(s) IV Push once  niCARdipine Infusion 5 mG/Hr (25 mL/Hr) IV Continuous <Continuous>  nitroglycerin  Infusion 5 MICROgram(s)/Min (1.5 mL/Hr) IV Continuous <Continuous>  norepinephrine Infusion 0.05 MICROgram(s)/kG/Min (7.74 mL/Hr) IV Continuous <Continuous>  polyethylene glycol 3350 17 Gram(s) Oral daily  propofol Infusion 10 MICROgram(s)/kG/Min (4.96 mL/Hr) IV Continuous <Continuous>  sodium chloride 0.9%. 1000 milliLiter(s) (20 mL/Hr) IV Continuous <Continuous>  vasopressin Infusion 0.033 Unit(s)/Min (2 mL/Hr) IV Continuous <Continuous>    MEDICATIONS  (PRN):  dextrose 40% Gel 15 Gram(s) Oral once PRN Blood Glucose LESS THAN 70 milliGRAM(s)/deciliter  glucagon  Injectable 1 milliGRAM(s) IntraMuscular once PRN Glucose LESS THAN 70 milligrams/deciliter  ondansetron  IVPB 4 milliGRAM(s) IV Intermittent once PRN Nausea and/or Vomiting  oxyCODONE    IR 10 milliGRAM(s) Oral every 4 hours PRN Severe Pain (7 - 10)  oxyCODONE    IR 5 milliGRAM(s) Oral every 4 hours PRN Moderate Pain (4 - 6)      LABS:  ABG - ( 19 Mar 2020 04:27 )  pH, Arterial: 7.36  pH, Blood: x     /  pCO2: 38    /  pO2: 86    / HCO3: 22    / Base Excess: -3.5  /  SaO2: 98                                      9.3    7.14  )-----------( 94       ( 19 Mar 2020 02:00 )             27.7     03-19    141  |  108  |  10  ----------------------------<  130<H>  4.4   |  20  |  0.9    Ca    8.0<L>      19 Mar 2020 02:00  Mg     2.1     03-19    TPro  5.8<L>  /  Alb  4.8  /  TBili  2.9<H>  /  DBili  x   /  AST  49<H>  /  ALT  30  /  AlkPhos  28<L>  03-19    PT/INR - ( 19 Mar 2020 02:00 )   PT: 14.00 sec;   INR: 1.22 ratio         PTT - ( 19 Mar 2020 02:00 )  PTT:32.6 sec        RADIOLOGY:  Reviewed and interpreted by me  CXR from 03-19-20 shows [+] mild congestion, [-] pneumothorax, [-] R/L effusion, [-] cardiomegaly,   S-G Catheter in place, R/L TLC in place, R/L Chest Tubes in place    ECG:  Reviewed and interpreted by me:   QTC:    Assessment:  CAD SP CABG  Acute blood loss anemia  post-op hypotension on pressors    PAST MEDICAL & SURGICAL HISTORY:  CAD (coronary artery disease): s/p PTCA WITH 2 STENTS  H/O hyperlipidemia  H/O: HTN (hypertension)  DM2 (diabetes mellitus, type 2)  History of tonsillectomy  H/O knee surgery      PLAN:  Neuro: Pain control  Pulm: Encourage coughing, deep breathing and use of incentive spirometry. Wean off supplemental oxygen as able. Daily CXR.   Cardio: Monitor telemetry/alarms. Continue cardiac meds, wean pressors  GI: Tolerating diet. Continue stool softeners. Continue GI prophylaxis  Renal: monitor urine output, supplement electrolytes as needed  Vasc: Heparin SC/SCDs for DVT prophylaxis  Heme: Monitor H/H.   ID: Off antibiotics. Stable.  Endocrine: Monitor finger stick blood sugar and control hyperglycemia with insulin  Physical Therapy: OOB/ambulate  Tubes: Monitor Chest tube output      Discussed with Cardiothoracic Team at AM rounds.    45 minutes of critical care time spent providing medical care for patient's acute illness/conditions that impairs at least one vital organ system and/or poses a high risk of imminent or life threatening deterioration in the patient's condition. It includes time spent evaluating and treating the patient's acute illness as well as time spent reviewing labs, radiology, discussing goals of care with patient and/or patient's family, and discussing the case with a multidisciplinary team in an effort to prevent further life threatening deterioration or end organ damage. This time is independent of any procedures performed.

## 2020-03-19 NOTE — PHYSICAL THERAPY INITIAL EVALUATION ADULT - PERTINENT HX OF CURRENT PROBLEM, REHAB EVAL
59 yrs old with Hx of CAD, D , DL, HTN is here for elective LHC. Patient c/o progressive chest pain on exertion.

## 2020-03-20 LAB
ALBUMIN SERPL ELPH-MCNC: 4.7 G/DL — SIGNIFICANT CHANGE UP (ref 3.5–5.2)
ALP SERPL-CCNC: 34 U/L — SIGNIFICANT CHANGE UP (ref 30–115)
ALT FLD-CCNC: 27 U/L — SIGNIFICANT CHANGE UP (ref 0–41)
ANION GAP SERPL CALC-SCNC: 13 MMOL/L — SIGNIFICANT CHANGE UP (ref 7–14)
ANION GAP SERPL CALC-SCNC: 14 MMOL/L — SIGNIFICANT CHANGE UP (ref 7–14)
ANION GAP SERPL CALC-SCNC: 14 MMOL/L — SIGNIFICANT CHANGE UP (ref 7–14)
APTT BLD: 32.7 SEC — SIGNIFICANT CHANGE UP (ref 27–39.2)
AST SERPL-CCNC: 40 U/L — SIGNIFICANT CHANGE UP (ref 0–41)
BASE EXCESS BLDA CALC-SCNC: -3.8 MMOL/L — LOW (ref -2–2)
BASOPHILS # BLD AUTO: 0.06 K/UL — SIGNIFICANT CHANGE UP (ref 0–0.2)
BASOPHILS NFR BLD AUTO: 0.6 % — SIGNIFICANT CHANGE UP (ref 0–1)
BILIRUB SERPL-MCNC: 4.2 MG/DL — HIGH (ref 0.2–1.2)
BUN SERPL-MCNC: 10 MG/DL — SIGNIFICANT CHANGE UP (ref 10–20)
BUN SERPL-MCNC: 12 MG/DL — SIGNIFICANT CHANGE UP (ref 10–20)
BUN SERPL-MCNC: 14 MG/DL — SIGNIFICANT CHANGE UP (ref 10–20)
CALCIUM SERPL-MCNC: 7.3 MG/DL — LOW (ref 8.5–10.1)
CALCIUM SERPL-MCNC: 8 MG/DL — LOW (ref 8.5–10.1)
CALCIUM SERPL-MCNC: 8.1 MG/DL — LOW (ref 8.5–10.1)
CHLORIDE SERPL-SCNC: 100 MMOL/L — SIGNIFICANT CHANGE UP (ref 98–110)
CHLORIDE SERPL-SCNC: 101 MMOL/L — SIGNIFICANT CHANGE UP (ref 98–110)
CHLORIDE SERPL-SCNC: 103 MMOL/L — SIGNIFICANT CHANGE UP (ref 98–110)
CO2 SERPL-SCNC: 18 MMOL/L — SIGNIFICANT CHANGE UP (ref 17–32)
CO2 SERPL-SCNC: 22 MMOL/L — SIGNIFICANT CHANGE UP (ref 17–32)
CO2 SERPL-SCNC: 23 MMOL/L — SIGNIFICANT CHANGE UP (ref 17–32)
CREAT SERPL-MCNC: 0.7 MG/DL — SIGNIFICANT CHANGE UP (ref 0.7–1.5)
CREAT SERPL-MCNC: 0.8 MG/DL — SIGNIFICANT CHANGE UP (ref 0.7–1.5)
CREAT SERPL-MCNC: 0.8 MG/DL — SIGNIFICANT CHANGE UP (ref 0.7–1.5)
EOSINOPHIL # BLD AUTO: 0.09 K/UL — SIGNIFICANT CHANGE UP (ref 0–0.7)
EOSINOPHIL NFR BLD AUTO: 1 % — SIGNIFICANT CHANGE UP (ref 0–8)
GAS PNL BLDA: SIGNIFICANT CHANGE UP
GLUCOSE BLDC GLUCOMTR-MCNC: 105 MG/DL — HIGH (ref 70–99)
GLUCOSE BLDC GLUCOMTR-MCNC: 110 MG/DL — HIGH (ref 70–99)
GLUCOSE BLDC GLUCOMTR-MCNC: 111 MG/DL — HIGH (ref 70–99)
GLUCOSE BLDC GLUCOMTR-MCNC: 128 MG/DL — HIGH (ref 70–99)
GLUCOSE BLDC GLUCOMTR-MCNC: 130 MG/DL — HIGH (ref 70–99)
GLUCOSE BLDC GLUCOMTR-MCNC: 170 MG/DL — HIGH (ref 70–99)
GLUCOSE BLDC GLUCOMTR-MCNC: 196 MG/DL — HIGH (ref 70–99)
GLUCOSE BLDC GLUCOMTR-MCNC: 201 MG/DL — HIGH (ref 70–99)
GLUCOSE BLDC GLUCOMTR-MCNC: 76 MG/DL — SIGNIFICANT CHANGE UP (ref 70–99)
GLUCOSE BLDC GLUCOMTR-MCNC: 92 MG/DL — SIGNIFICANT CHANGE UP (ref 70–99)
GLUCOSE SERPL-MCNC: 149 MG/DL — HIGH (ref 70–99)
GLUCOSE SERPL-MCNC: 211 MG/DL — HIGH (ref 70–99)
GLUCOSE SERPL-MCNC: 84 MG/DL — SIGNIFICANT CHANGE UP (ref 70–99)
HCO3 BLDA-SCNC: 20 MMOL/L — LOW (ref 23–27)
HCT VFR BLD CALC: 29.4 % — LOW (ref 42–52)
HGB BLD-MCNC: 9.9 G/DL — LOW (ref 14–18)
HOROWITZ INDEX BLDA+IHG-RTO: 36 — SIGNIFICANT CHANGE UP
IMM GRANULOCYTES NFR BLD AUTO: 0.2 % — SIGNIFICANT CHANGE UP (ref 0.1–0.3)
INR BLD: 1.28 RATIO — SIGNIFICANT CHANGE UP (ref 0.65–1.3)
LYMPHOCYTES # BLD AUTO: 2.49 K/UL — SIGNIFICANT CHANGE UP (ref 1.2–3.4)
LYMPHOCYTES # BLD AUTO: 26.3 % — SIGNIFICANT CHANGE UP (ref 20.5–51.1)
MAGNESIUM SERPL-MCNC: 2.3 MG/DL — SIGNIFICANT CHANGE UP (ref 1.8–2.4)
MAGNESIUM SERPL-MCNC: 2.4 MG/DL — SIGNIFICANT CHANGE UP (ref 1.8–2.4)
MCHC RBC-ENTMCNC: 30.7 PG — SIGNIFICANT CHANGE UP (ref 27–31)
MCHC RBC-ENTMCNC: 33.7 G/DL — SIGNIFICANT CHANGE UP (ref 32–37)
MCV RBC AUTO: 91.3 FL — SIGNIFICANT CHANGE UP (ref 80–94)
MONOCYTES # BLD AUTO: 0.92 K/UL — HIGH (ref 0.1–0.6)
MONOCYTES NFR BLD AUTO: 9.7 % — HIGH (ref 1.7–9.3)
NEUTROPHILS # BLD AUTO: 5.88 K/UL — SIGNIFICANT CHANGE UP (ref 1.4–6.5)
NEUTROPHILS NFR BLD AUTO: 62.2 % — SIGNIFICANT CHANGE UP (ref 42.2–75.2)
NRBC # BLD: 0 /100 WBCS — SIGNIFICANT CHANGE UP (ref 0–0)
PCO2 BLDA: 32 MMHG — LOW (ref 38–42)
PH BLDA: 7.41 — SIGNIFICANT CHANGE UP (ref 7.38–7.42)
PLATELET # BLD AUTO: 98 K/UL — LOW (ref 130–400)
PO2 BLDA: 77 MMHG — LOW (ref 78–95)
POTASSIUM SERPL-MCNC: 3.7 MMOL/L — SIGNIFICANT CHANGE UP (ref 3.5–5)
POTASSIUM SERPL-MCNC: 3.9 MMOL/L — SIGNIFICANT CHANGE UP (ref 3.5–5)
POTASSIUM SERPL-MCNC: 3.9 MMOL/L — SIGNIFICANT CHANGE UP (ref 3.5–5)
POTASSIUM SERPL-SCNC: 3.7 MMOL/L — SIGNIFICANT CHANGE UP (ref 3.5–5)
POTASSIUM SERPL-SCNC: 3.9 MMOL/L — SIGNIFICANT CHANGE UP (ref 3.5–5)
POTASSIUM SERPL-SCNC: 3.9 MMOL/L — SIGNIFICANT CHANGE UP (ref 3.5–5)
PROT SERPL-MCNC: 5.7 G/DL — LOW (ref 6–8)
PROTHROM AB SERPL-ACNC: 14.7 SEC — HIGH (ref 9.95–12.87)
RBC # BLD: 3.22 M/UL — LOW (ref 4.7–6.1)
RBC # FLD: 12.9 % — SIGNIFICANT CHANGE UP (ref 11.5–14.5)
SAO2 % BLDA: 96 % — SIGNIFICANT CHANGE UP (ref 94–98)
SODIUM SERPL-SCNC: 133 MMOL/L — LOW (ref 135–146)
SODIUM SERPL-SCNC: 136 MMOL/L — SIGNIFICANT CHANGE UP (ref 135–146)
SODIUM SERPL-SCNC: 139 MMOL/L — SIGNIFICANT CHANGE UP (ref 135–146)
WBC # BLD: 9.46 K/UL — SIGNIFICANT CHANGE UP (ref 4.8–10.8)
WBC # FLD AUTO: 9.46 K/UL — SIGNIFICANT CHANGE UP (ref 4.8–10.8)

## 2020-03-20 PROCEDURE — 99291 CRITICAL CARE FIRST HOUR: CPT

## 2020-03-20 PROCEDURE — 99252 IP/OBS CONSLTJ NEW/EST SF 35: CPT

## 2020-03-20 PROCEDURE — 70498 CT ANGIOGRAPHY NECK: CPT | Mod: 26

## 2020-03-20 PROCEDURE — 99233 SBSQ HOSP IP/OBS HIGH 50: CPT

## 2020-03-20 PROCEDURE — 70450 CT HEAD/BRAIN W/O DYE: CPT | Mod: 26,59

## 2020-03-20 PROCEDURE — 71045 X-RAY EXAM CHEST 1 VIEW: CPT | Mod: 26

## 2020-03-20 PROCEDURE — 76705 ECHO EXAM OF ABDOMEN: CPT | Mod: 26

## 2020-03-20 PROCEDURE — 70496 CT ANGIOGRAPHY HEAD: CPT | Mod: 26

## 2020-03-20 RX ORDER — NITROGLYCERIN 6.5 MG
5 CAPSULE, EXTENDED RELEASE ORAL
Qty: 50 | Refills: 0 | Status: DISCONTINUED | OUTPATIENT
Start: 2020-03-20 | End: 2020-03-20

## 2020-03-20 RX ORDER — ACETAMINOPHEN 500 MG
1000 TABLET ORAL ONCE
Refills: 0 | Status: COMPLETED | OUTPATIENT
Start: 2020-03-20 | End: 2020-03-20

## 2020-03-20 RX ORDER — METOPROLOL TARTRATE 50 MG
12.5 TABLET ORAL
Refills: 0 | Status: DISCONTINUED | OUTPATIENT
Start: 2020-03-20 | End: 2020-03-20

## 2020-03-20 RX ORDER — ASPIRIN/CALCIUM CARB/MAGNESIUM 324 MG
325 TABLET ORAL DAILY
Refills: 0 | Status: DISCONTINUED | OUTPATIENT
Start: 2020-03-20 | End: 2020-03-23

## 2020-03-20 RX ORDER — POTASSIUM CHLORIDE 20 MEQ
20 PACKET (EA) ORAL ONCE
Refills: 0 | Status: COMPLETED | OUTPATIENT
Start: 2020-03-20 | End: 2020-03-20

## 2020-03-20 RX ORDER — SODIUM CHLORIDE 9 MG/ML
1000 INJECTION INTRAMUSCULAR; INTRAVENOUS; SUBCUTANEOUS
Refills: 0 | Status: DISCONTINUED | OUTPATIENT
Start: 2020-03-20 | End: 2020-03-20

## 2020-03-20 RX ORDER — METOPROLOL TARTRATE 50 MG
5 TABLET ORAL ONCE
Refills: 0 | Status: COMPLETED | OUTPATIENT
Start: 2020-03-20 | End: 2020-03-20

## 2020-03-20 RX ORDER — KETOROLAC TROMETHAMINE 30 MG/ML
30 SYRINGE (ML) INJECTION ONCE
Refills: 0 | Status: DISCONTINUED | OUTPATIENT
Start: 2020-03-20 | End: 2020-03-20

## 2020-03-20 RX ORDER — ISOSORBIDE DINITRATE 5 MG/1
5 TABLET ORAL THREE TIMES A DAY
Refills: 0 | Status: DISCONTINUED | OUTPATIENT
Start: 2020-03-20 | End: 2020-03-22

## 2020-03-20 RX ORDER — POTASSIUM CHLORIDE 20 MEQ
20 PACKET (EA) ORAL
Refills: 0 | Status: COMPLETED | OUTPATIENT
Start: 2020-03-20 | End: 2020-03-20

## 2020-03-20 RX ORDER — SODIUM BICARBONATE 1 MEQ/ML
50 SYRINGE (ML) INTRAVENOUS ONCE
Refills: 0 | Status: COMPLETED | OUTPATIENT
Start: 2020-03-20 | End: 2020-03-20

## 2020-03-20 RX ORDER — CLOPIDOGREL BISULFATE 75 MG/1
75 TABLET, FILM COATED ORAL DAILY
Refills: 0 | Status: DISCONTINUED | OUTPATIENT
Start: 2020-03-20 | End: 2020-03-23

## 2020-03-20 RX ORDER — NICARDIPINE HYDROCHLORIDE 30 MG/1
5 CAPSULE, EXTENDED RELEASE ORAL
Qty: 40 | Refills: 0 | Status: DISCONTINUED | OUTPATIENT
Start: 2020-03-20 | End: 2020-03-21

## 2020-03-20 RX ORDER — ALBUMIN HUMAN 25 %
1000 VIAL (ML) INTRAVENOUS ONCE
Refills: 0 | Status: COMPLETED | OUTPATIENT
Start: 2020-03-20 | End: 2020-03-20

## 2020-03-20 RX ORDER — ALBUMIN HUMAN 25 %
500 VIAL (ML) INTRAVENOUS ONCE
Refills: 0 | Status: COMPLETED | OUTPATIENT
Start: 2020-03-20 | End: 2020-03-20

## 2020-03-20 RX ORDER — COLCHICINE 0.6 MG
0.6 TABLET ORAL EVERY 12 HOURS
Refills: 0 | Status: DISCONTINUED | OUTPATIENT
Start: 2020-03-20 | End: 2020-03-21

## 2020-03-20 RX ORDER — FUROSEMIDE 40 MG
40 TABLET ORAL ONCE
Refills: 0 | Status: COMPLETED | OUTPATIENT
Start: 2020-03-20 | End: 2020-03-20

## 2020-03-20 RX ORDER — METOPROLOL TARTRATE 50 MG
25 TABLET ORAL
Refills: 0 | Status: DISCONTINUED | OUTPATIENT
Start: 2020-03-20 | End: 2020-03-23

## 2020-03-20 RX ORDER — ALBUMIN HUMAN 25 %
500 VIAL (ML) INTRAVENOUS ONCE
Refills: 0 | Status: DISCONTINUED | OUTPATIENT
Start: 2020-03-20 | End: 2020-03-22

## 2020-03-20 RX ORDER — VASOPRESSIN 20 [USP'U]/ML
0.02 INJECTION INTRAVENOUS
Qty: 50 | Refills: 0 | Status: DISCONTINUED | OUTPATIENT
Start: 2020-03-20 | End: 2020-03-20

## 2020-03-20 RX ORDER — SODIUM CHLORIDE 9 MG/ML
1000 INJECTION INTRAMUSCULAR; INTRAVENOUS; SUBCUTANEOUS
Refills: 0 | Status: DISCONTINUED | OUTPATIENT
Start: 2020-03-20 | End: 2020-03-21

## 2020-03-20 RX ORDER — INSULIN GLARGINE 100 [IU]/ML
20 INJECTION, SOLUTION SUBCUTANEOUS EVERY MORNING
Refills: 0 | Status: DISCONTINUED | OUTPATIENT
Start: 2020-03-20 | End: 2020-03-21

## 2020-03-20 RX ADMIN — OXYCODONE HYDROCHLORIDE 5 MILLIGRAM(S): 5 TABLET ORAL at 13:10

## 2020-03-20 RX ADMIN — HEPARIN SODIUM 5000 UNIT(S): 5000 INJECTION INTRAVENOUS; SUBCUTANEOUS at 17:35

## 2020-03-20 RX ADMIN — Medication 100 MILLIEQUIVALENT(S): at 11:14

## 2020-03-20 RX ADMIN — Medication 100 GRAM(S): at 05:18

## 2020-03-20 RX ADMIN — HEPARIN SODIUM 5000 UNIT(S): 5000 INJECTION INTRAVENOUS; SUBCUTANEOUS at 05:18

## 2020-03-20 RX ADMIN — Medication 1000 MILLIGRAM(S): at 22:45

## 2020-03-20 RX ADMIN — Medication 12.5 MILLIGRAM(S): at 10:10

## 2020-03-20 RX ADMIN — FAMOTIDINE 20 MILLIGRAM(S): 10 INJECTION INTRAVENOUS at 05:18

## 2020-03-20 RX ADMIN — Medication 600 MILLIGRAM(S): at 17:35

## 2020-03-20 RX ADMIN — ATORVASTATIN CALCIUM 40 MILLIGRAM(S): 80 TABLET, FILM COATED ORAL at 21:28

## 2020-03-20 RX ADMIN — Medication 600 MILLIGRAM(S): at 05:18

## 2020-03-20 RX ADMIN — Medication 25 MILLIGRAM(S): at 17:35

## 2020-03-20 RX ADMIN — Medication 400 MILLIGRAM(S): at 22:03

## 2020-03-20 RX ADMIN — Medication 250 MILLILITER(S): at 11:16

## 2020-03-20 RX ADMIN — Medication 30 MILLIGRAM(S): at 09:15

## 2020-03-20 RX ADMIN — Medication 1 SPRAY(S): at 07:49

## 2020-03-20 RX ADMIN — Medication 500 MILLILITER(S): at 12:00

## 2020-03-20 RX ADMIN — Medication 30 MILLIGRAM(S): at 09:30

## 2020-03-20 RX ADMIN — Medication 50 MILLIEQUIVALENT(S): at 10:45

## 2020-03-20 RX ADMIN — Medication 30 MILLIGRAM(S): at 16:00

## 2020-03-20 RX ADMIN — Medication 3 MILLILITER(S): at 14:16

## 2020-03-20 RX ADMIN — OXYCODONE HYDROCHLORIDE 10 MILLIGRAM(S): 5 TABLET ORAL at 03:30

## 2020-03-20 RX ADMIN — Medication 100 GRAM(S): at 18:07

## 2020-03-20 RX ADMIN — Medication 3 MILLILITER(S): at 20:34

## 2020-03-20 RX ADMIN — CLOPIDOGREL BISULFATE 75 MILLIGRAM(S): 75 TABLET, FILM COATED ORAL at 13:02

## 2020-03-20 RX ADMIN — Medication 3 MILLILITER(S): at 08:45

## 2020-03-20 RX ADMIN — OXYCODONE HYDROCHLORIDE 5 MILLIGRAM(S): 5 TABLET ORAL at 12:40

## 2020-03-20 RX ADMIN — Medication 50 MILLIEQUIVALENT(S): at 16:00

## 2020-03-20 RX ADMIN — OXYCODONE HYDROCHLORIDE 10 MILLIGRAM(S): 5 TABLET ORAL at 03:22

## 2020-03-20 RX ADMIN — Medication 40 MILLIGRAM(S): at 09:00

## 2020-03-20 RX ADMIN — Medication 325 MILLIGRAM(S): at 15:00

## 2020-03-20 RX ADMIN — Medication 100 MILLIEQUIVALENT(S): at 06:39

## 2020-03-20 RX ADMIN — ISOSORBIDE DINITRATE 5 MILLIGRAM(S): 5 TABLET ORAL at 21:28

## 2020-03-20 RX ADMIN — ISOSORBIDE DINITRATE 5 MILLIGRAM(S): 5 TABLET ORAL at 14:59

## 2020-03-20 RX ADMIN — Medication 0.6 MILLIGRAM(S): at 17:35

## 2020-03-20 RX ADMIN — FAMOTIDINE 20 MILLIGRAM(S): 10 INJECTION INTRAVENOUS at 17:35

## 2020-03-20 RX ADMIN — INSULIN GLARGINE 20 UNIT(S): 100 INJECTION, SOLUTION SUBCUTANEOUS at 15:40

## 2020-03-20 RX ADMIN — Medication 5 MILLIGRAM(S): at 21:35

## 2020-03-20 RX ADMIN — Medication 50 MILLIEQUIVALENT(S): at 17:18

## 2020-03-20 NOTE — STROKE CODE NOTE - DETAILS:
Patient seen and examined with NP Fung during stroke code and exam consistent with stroke.  NOt candidate for IV TPA NIHSS 5 however suggest posterior circulation large vessel territory so would obtain CTA/CTP to assess for occlusion and penumbra.  Increase neurochecks to q1 hour  If no CTA CTP done for  then please recall stat for acute worsening of exam and then once again consider CTA CTP

## 2020-03-20 NOTE — PROGRESS NOTE ADULT - SUBJECTIVE AND OBJECTIVE BOX
OPERATIVE PROCEDURE(s):                POD #                       59yMale  SURGEON(s): PRECIOUS Yadav  SUBJECTIVE ASSESSMENT:   Vital Signs Last 24 Hrs  T(F): 98.7 (20 Mar 2020 06:00), Max: 99.5 (20 Mar 2020 01:00)  HR: 93 (20 Mar 2020 07:00) (81 - 101)  BP: 156/91 (20 Mar 2020 07:00) (119/77 - 156/91)  BP(mean): 118 (20 Mar 2020 07:00) (93 - 118)  ABP: 161/77 (20 Mar 2020 07:00) (34/34 - 226/179)  ABP(mean): 112 (20 Mar 2020 07:00)  RR: 36 (20 Mar 2020 05:30) (15 - 58)  SpO2: 97% (20 Mar 2020 07:00) (94% - 100%)  CVP(mm Hg): 15 (19 Mar 2020 09:00)  CVP(cm H2O): --  CO: --  CI: --  PA: 45/13 (19 Mar 2020 09:00)  SVR: --    I&O's Detail    19 Mar 2020 07:01  -  20 Mar 2020 07:00  --------------------------------------------------------  IN:    insulin regular Infusion: 1 mL    insulin regular Infusion: 55 mL    IV PiggyBack: 350 mL    norepinephrine Infusion: 14 mL    Oral Fluid: 990 mL    sodium chloride 0.9%.: 480 mL    vasopressin Infusion: 38 mL  Total IN: 1928 mL    OUT:    Chest Tube: 110 mL    Chest Tube: 157 mL    Indwelling Catheter - Urethral: 1300 mL  Total OUT: 1567 mL        Net:   I&O's Detail    18 Mar 2020 07:01  -  19 Mar 2020 07:00  --------------------------------------------------------  Total NET: 2813 mL      19 Mar 2020 07:01  -  20 Mar 2020 07:00  --------------------------------------------------------  Total NET: 361 mL        CAPILLARY BLOOD GLUCOSE  104 (20 Mar 2020 04:45)  126 (19 Mar 2020 18:00)  119 (19 Mar 2020 16:00)  158 (19 Mar 2020 14:00)  162 (19 Mar 2020 12:00)  170 (19 Mar 2020 10:00)  136 (19 Mar 2020 08:00)      POCT Blood Glucose.: 196 mg/dL (20 Mar 2020 08:38)  POCT Blood Glucose.: 128 mg/dL (20 Mar 2020 06:22)  POCT Blood Glucose.: 92 mg/dL (20 Mar 2020 01:47)  POCT Blood Glucose.: 97 mg/dL (19 Mar 2020 21:58)  POCT Blood Glucose.: 121 mg/dL (19 Mar 2020 19:59)  POCT Blood Glucose.: 129 mg/dL (19 Mar 2020 18:02)  POCT Blood Glucose.: 119 mg/dL (19 Mar 2020 15:50)  POCT Blood Glucose.: 158 mg/dL (19 Mar 2020 13:45)  POCT Blood Glucose.: 162 mg/dL (19 Mar 2020 12:21)  POCT Blood Glucose.: 170 mg/dL (19 Mar 2020 10:11)    Physical Exam:  General: NAD; A&Ox3  Cardiac: S1/S2, RRR, no murmur, no rubs  Lungs: unlabored respirations, CTA b/l, no wheeze, no rales, no crackles  Abdomen: Soft/NT/ND; positive bowel sounds x 4  Sternum: Intact, no click, incision healing well with no drainage  Incisions: Incisions clean/dry/intact  Extremities: No edema b/l lower extremities; good capillary refill; no cyanosis; palpable 1+ pedal pulses b/l    Central Venous Catheter: Yes[]  No[] , If Yes indication:           Day #  Spencer Catheter: Yes  [] , No  [] , If yes indication:                      Day #  NGT: Yes [] No [] ,    If Yes Placement:                                     Day #  EPICARDIAL WIRES:  [] YES [] NO                                              Day #  BOWEL MOVEMENT:  [] YES [] NO, If No, Timing since last BM:      Day #  CHEST TUBE(Left/Right):  [] YES [] NO, If yes -  AIR LEAKS:  [] YES [] NO        LABS:                        9.9<L>  9.46  )-----------( 98<L>    ( 20 Mar 2020 02:00 )             29.4<L>                        9.3<L>  7.14  )-----------( 94<L>    ( 19 Mar 2020 02:00 )             27.7<L>    03-20    136  |  100  |  14  ----------------------------<  84  3.9   |  22  |  0.8  03-19    141  |  108  |  10  ----------------------------<  130<H>  4.4   |  20  |  0.9    Ca    8.1<L>      20 Mar 2020 02:00  Mg     2.4     03-20    TPro  5.7<L> [6.0 - 8.0]  /  Alb  4.7 [3.5 - 5.2]  /  TBili  4.2<H> [0.2 - 1.2]  /  DBili  x   /  AST  40 [0 - 41]  /  ALT  27 [0 - 41]  /  AlkPhos  34 [30 - 115]  03-20    PT/INR - ( 20 Mar 2020 02:00 )   PT: ;   INR: 1.28 ratio         PT/INR - ( 19 Mar 2020 02:00 )   PT: ;   INR: 1.22 ratio         PTT - ( 20 Mar 2020 02:00 )  PTT:32.7 sec, PTT - ( 19 Mar 2020 02:00 )  PTT:32.6 sec    ABG - ( 20 Mar 2020 04:54 )  pH: 7.37  /  pCO2: 38    /  pO2: 75    / HCO3: 22    / Base Excess: -3.0  /  SaO2: 95    /  LA: 1.0              RADIOLOGY & ADDITIONAL TESTS:  CXR:  EKG:  MEDICATIONS  (STANDING):  albuterol/ipratropium for Nebulization 3 milliLiter(s) Nebulizer every 6 hours  aspirin 325 milliGRAM(s) Oral daily  atorvastatin 40 milliGRAM(s) Oral at bedtime  benzocaine 20% Spray 1 Spray(s) Topical every 4 hours  chlorhexidine 4% Liquid 1 Application(s) Topical <User Schedule>  dextrose 50% Injectable 50 milliLiter(s) IV Push every 15 minutes  dextrose 50% Injectable 25 milliLiter(s) IV Push every 15 minutes  famotidine    Tablet 20 milliGRAM(s) Oral two times a day  guaiFENesin  milliGRAM(s) Oral every 12 hours  heparin  Injectable 5000 Unit(s) SubCutaneous every 12 hours  influenza   Vaccine 0.5 milliLiter(s) IntraMuscular once  insulin regular Infusion 1 Unit(s)/Hr (1 mL/Hr) IV Continuous <Continuous>  magnesium sulfate  IVPB 1 Gram(s) IV Intermittent every 12 hours  norepinephrine Infusion 0.05 MICROgram(s)/kG/Min (7.74 mL/Hr) IV Continuous <Continuous>  polyethylene glycol 3350 17 Gram(s) Oral daily  sodium chloride 0.9%. 1000 milliLiter(s) (20 mL/Hr) IV Continuous <Continuous>  vasopressin Infusion 0.02 Unit(s)/Min (1.2 mL/Hr) IV Continuous <Continuous>    MEDICATIONS  (PRN):  glucagon  Injectable 1 milliGRAM(s) IntraMuscular once PRN Glucose LESS THAN 70 milligrams/deciliter  ondansetron  IVPB 4 milliGRAM(s) IV Intermittent once PRN Nausea and/or Vomiting  oxyCODONE    IR 10 milliGRAM(s) Oral every 4 hours PRN Severe Pain (7 - 10)  oxyCODONE    IR 5 milliGRAM(s) Oral every 4 hours PRN Moderate Pain (4 - 6)    HEPARIN:  [] YES [] NO  Dose: XX UNITS/HR UNITS Q8H  LOVENOX:[] YES [] NO  Dose: XX mg Q24H  COUMADIN: []  YES [] NO  Dose: XX mg  Q24H  SCD's: YES b/l  GI Prophylaxis: Protonix [], Pepcid [], None [], (Contra-indication:.....)    Post-Op Beta-Blockers: Yes [], No[], If No, then contraindication:  Post-Op Aspirin: Yes [],  No [], If No, then contraindication:  Post-Op Statin: Yes [], No[], If No, then contraindication:  Allergies    No Known Allergies    Intolerances      Ambulation/Activity Status:    Assessment/Plan:  59y Male status-post .....  - Case and plan discussed with CTU Intensivist and CT Surgeon - Dr. Yadav/Keyur/Gorge   - Continue CTU supportive care    - Continue DVT/GI prophylaxis  - Incentive Spirometry 10 times an hour  - Continue to advance physical activity as tolerated and continue PT/OT as directed  1. CAD: Continue ASA, statin, BB  2. HTN:   3. A. Fib:   4. COPD/Hypoxia:   5. DM/Glucose Control:     Social Service Disposition: OPERATIVE PROCEDURE(s):                POD #  2 s/p CABg w                     59yMale  SURGEON(s): PRECIOUS Yadav  SUBJECTIVE ASSESSMENT: developed hemiopsia this AM of right eye  Vital Signs Last 24 Hrs  T(F): 98.7 (20 Mar 2020 06:00), Max: 99.5 (20 Mar 2020 01:00)  HR: 93 (20 Mar 2020 07:00) (81 - 101)  BP: 156/91 (20 Mar 2020 07:00) (119/77 - 156/91)  BP(mean): 118 (20 Mar 2020 07:00) (93 - 118)  ABP: 161/77 (20 Mar 2020 07:00) (34/34 - 226/179)  ABP(mean): 112 (20 Mar 2020 07:00)  RR: 36 (20 Mar 2020 05:30) (15 - 58)  SpO2: 97% (20 Mar 2020 07:00) (94% - 100%)  CVP(mm Hg): 15 (19 Mar 2020 09:00)  PA: 45/13 (19 Mar 2020 09:00)      I&O's Detail    19 Mar 2020 07:01  -  20 Mar 2020 07:00  --------------------------------------------------------  IN:    insulin regular Infusion: 1 mL    insulin regular Infusion: 55 mL    IV PiggyBack: 350 mL    norepinephrine Infusion: 14 mL    Oral Fluid: 990 mL    sodium chloride 0.9%.: 480 mL    vasopressin Infusion: 38 mL  Total IN: 1928 mL    OUT:    Chest Tube: 110 mL    Chest Tube: 157 mL    Indwelling Catheter - Urethral: 1300 mL  Total OUT: 1567 mL    Net:   I&O's Detail    18 Mar 2020 07:01  -  19 Mar 2020 07:00  --------------------------------------------------------  Total NET: 2813 mL      19 Mar 2020 07:01  -  20 Mar 2020 07:00  --------------------------------------------------------  Total NET: 361 mL  CAPILLARY BLOOD GLUCOSE  104 (20 Mar 2020 04:45)  126 (19 Mar 2020 18:00)  119 (19 Mar 2020 16:00)  158 (19 Mar 2020 14:00)  162 (19 Mar 2020 12:00)  170 (19 Mar 2020 10:00)  136 (19 Mar 2020 08:00)    POCT Blood Glucose.: 196 mg/dL (20 Mar 2020 08:38)  POCT Blood Glucose.: 128 mg/dL (20 Mar 2020 06:22)  POCT Blood Glucose.: 92 mg/dL (20 Mar 2020 01:47)  POCT Blood Glucose.: 97 mg/dL (19 Mar 2020 21:58)  POCT Blood Glucose.: 121 mg/dL (19 Mar 2020 19:59)  POCT Blood Glucose.: 129 mg/dL (19 Mar 2020 18:02)  POCT Blood Glucose.: 119 mg/dL (19 Mar 2020 15:50)  POCT Blood Glucose.: 158 mg/dL (19 Mar 2020 13:45)  POCT Blood Glucose.: 162 mg/dL (19 Mar 2020 12:21)  POCT Blood Glucose.: 170 mg/dL (19 Mar 2020 10:11)    Physical Exam:  General: NAD; A&Ox3, + hemiopsia   Cardiac: S1/S2, RRR, no murmur, no rubs  Lungs: unlabored shallow respirations, b/l bs decreased at bases  Abdomen: Soft/NT/ND;   Sternum: Intact, no click, incision healing well with no drainage  Incisions: Incisions clean/dry/intact  Extremities: No edema b/l lower extremities;   Neuro: moves all extremities    Central Venous Catheter: Yes[]  fresh post op with recent CVA  Webber Catheter: Yes  [] , strict I and o  EPICARDIAL WIRES:  [] YES [  BOWEL MOVEMENT: [] NO,         LABS:                        9.9<L>  9.46  )-----------( 98<L>    ( 20 Mar 2020 02:00 )             29.4<L>                        9.3<L>  7.14  )-----------( 94<L>    ( 19 Mar 2020 02:00 )             27.7<L>    03-20    136  |  100  |  14  ----------------------------<  84  3.9   |  22  |  0.8  03-19    141  |  108  |  10  ----------------------------<  130<H>  4.4   |  20  |  0.9    Ca    8.1<L>      20 Mar 2020 02:00  Mg     2.4     03-20    TPro  5.7<L> [6.0 - 8.0]  /  Alb  4.7 [3.5 - 5.2]  /  TBili  4.2<H> [0.2 - 1.2]  /  DBili  x   /  AST  40 [0 - 41]  /  ALT  27 [0 - 41]  /  AlkPhos  34 [30 - 115]  03-20    PT/INR - ( 20 Mar 2020 02:00 )   PT: ;   INR: 1.28 ratio       PT/INR - ( 19 Mar 2020 02:00 )   PT: ;   INR: 1.22 ratio       PTT - ( 20 Mar 2020 02:00 )  PTT:32.7 sec, PTT - ( 19 Mar 2020 02:00 )  PTT:32.6 sec    ABG - ( 20 Mar 2020 04:54 )  pH: 7.37  /  pCO2: 38    /  pO2: 75    / HCO3: 22    / Base Excess: -3.0  /  SaO2: 95    /  LA: 1.0      RADIOLOGY & ADDITIONAL TESTS:  CXR:  < from: Xray Chest 1 View- PORTABLE-Routine (03.20.20 @ 04:58) >  No significant change since one day earlier.    < from: CT Angio Neck w/ IV Cont (03.20.20 @ 11:16) >  IMPRESSION:     1.  No evidence of acute large vessel occlusion.    2.  Mild stenosis of the right MCA proximal M1 segment. Moderate stenosis of the right PCA distal P2 segment.    < end of copied text >  < from: US Abdomen Limited (03.20.20 @ 10:12) >  Echogenic hepatic parenchyma which may be secondary to fatty infiltration or hepatocellular disease.    Right pleural effusion    EKG: < from: 12 Lead ECG (03.19.20 @ 08:14) >  Diagnosis Line Normal sinus rhythm  Right bundle branch block  Abnormal ECG    < end of copied text >    MEDICATIONS  (STANDING):  albuterol/ipratropium for Nebulization 3 milliLiter(s) Nebulizer every 6 hours  aspirin 325 milliGRAM(s) Oral daily  atorvastatin 40 milliGRAM(s) Oral at bedtime  benzocaine 20% Spray 1 Spray(s) Topical every 4 hours  chlorhexidine 4% Liquid 1 Application(s) Topical <User Schedule>  dextrose 50% Injectable 50 milliLiter(s) IV Push every 15 minutes  dextrose 50% Injectable 25 milliLiter(s) IV Push every 15 minutes  famotidine    Tablet 20 milliGRAM(s) Oral two times a day  guaiFENesin  milliGRAM(s) Oral every 12 hours  heparin  Injectable 5000 Unit(s) SubCutaneous every 12 hours  influenza   Vaccine 0.5 milliLiter(s) IntraMuscular once  insulin regular Infusion 1 Unit(s)/Hr (1 mL/Hr) IV Continuous <Continuous>  magnesium sulfate  IVPB 1 Gram(s) IV Intermittent every 12 hours  norepinephrine Infusion 0.05 MICROgram(s)/kG/Min (7.74 mL/Hr) IV Continuous <Continuous>  polyethylene glycol 3350 17 Gram(s) Oral daily  sodium chloride 0.9%. 1000 milliLiter(s) (20 mL/Hr) IV Continuous <Continuous>  vasopressin Infusion 0.02 Unit(s)/Min (1.2 mL/Hr) IV Continuous <Continuous>    MEDICATIONS  (PRN):  glucagon  Injectable 1 milliGRAM(s) IntraMuscular once PRN Glucose LESS THAN 70 milligrams/deciliter  ondansetron  IVPB 4 milliGRAM(s) IV Intermittent once PRN Nausea and/or Vomiting  oxyCODONE    IR 10 milliGRAM(s) Oral every 4 hours PRN Severe Pain (7 - 10)  oxyCODONE    IR 5 milliGRAM(s) Oral every 4 hours PRN Moderate Pain (4 - 6)    Allergies    No Known Allergies    Intolerances  Ambulation/Activity Status:    Assessment/Plan:  59y Male status-post CABG with possible CVA  - Case and plan discussed with CTU Intensivist and CT Surgeon - Dr. Yadav/Keyur/Gorge   - Continue CTU supportive care    - Continue DVT/GI prophylaxis  - Incentive Spirometry 10 times an hour  - Continue to advance physical activity as tolerated and continue PT/OT as directed  1. CAD: Continue ASA, statin,  - Start metoprolol 12.5 mg BID  - start Isordil 5 mg q8h  - stop levo and vaso  - continue nitro drip for hypertension control  - start Lantus 20 and continue insulin gtt  - maintain NPO  - repeat LFt's in AM 2/2 rising bilirubin  - right uper quad sono revealed fatty live and   - will continue webber for I and D  - start Plavix as per Dr. Yadav  - maintain normal fluid balance  - neurology not appreciated      Social Service Disposition:  home next week OPERATIVE PROCEDURE(s):                POD #  2 s/p CABg w                     59yMale  SURGEON(s): PRECIOUS Yadav  SUBJECTIVE ASSESSMENT: developed hemiopsia this AM of right eye  Vital Signs Last 24 Hrs  T(F): 98.7 (20 Mar 2020 06:00), Max: 99.5 (20 Mar 2020 01:00)  HR: 93 (20 Mar 2020 07:00) (81 - 101)  BP: 156/91 (20 Mar 2020 07:00) (119/77 - 156/91)  BP(mean): 118 (20 Mar 2020 07:00) (93 - 118)  ABP: 161/77 (20 Mar 2020 07:00) (34/34 - 226/179)  ABP(mean): 112 (20 Mar 2020 07:00)  RR: 36 (20 Mar 2020 05:30) (15 - 58)  SpO2: 97% (20 Mar 2020 07:00) (94% - 100%)  CVP(mm Hg): 15 (19 Mar 2020 09:00)  PA: 45/13 (19 Mar 2020 09:00)      I&O's Detail    19 Mar 2020 07:01  -  20 Mar 2020 07:00  --------------------------------------------------------  IN:    insulin regular Infusion: 1 mL    insulin regular Infusion: 55 mL    IV PiggyBack: 350 mL    norepinephrine Infusion: 14 mL    Oral Fluid: 990 mL    sodium chloride 0.9%.: 480 mL    vasopressin Infusion: 38 mL  Total IN: 1928 mL    OUT:    Chest Tube: 110 mL    Chest Tube: 157 mL    Indwelling Catheter - Urethral: 1300 mL  Total OUT: 1567 mL    Net:   I&O's Detail    18 Mar 2020 07:01  -  19 Mar 2020 07:00  --------------------------------------------------------  Total NET: 2813 mL      19 Mar 2020 07:01  -  20 Mar 2020 07:00  --------------------------------------------------------  Total NET: 361 mL  CAPILLARY BLOOD GLUCOSE  104 (20 Mar 2020 04:45)  126 (19 Mar 2020 18:00)  119 (19 Mar 2020 16:00)  158 (19 Mar 2020 14:00)  162 (19 Mar 2020 12:00)  170 (19 Mar 2020 10:00)  136 (19 Mar 2020 08:00)    POCT Blood Glucose.: 196 mg/dL (20 Mar 2020 08:38)  POCT Blood Glucose.: 128 mg/dL (20 Mar 2020 06:22)  POCT Blood Glucose.: 92 mg/dL (20 Mar 2020 01:47)  POCT Blood Glucose.: 97 mg/dL (19 Mar 2020 21:58)  POCT Blood Glucose.: 121 mg/dL (19 Mar 2020 19:59)  POCT Blood Glucose.: 129 mg/dL (19 Mar 2020 18:02)  POCT Blood Glucose.: 119 mg/dL (19 Mar 2020 15:50)  POCT Blood Glucose.: 158 mg/dL (19 Mar 2020 13:45)  POCT Blood Glucose.: 162 mg/dL (19 Mar 2020 12:21)  POCT Blood Glucose.: 170 mg/dL (19 Mar 2020 10:11)    Physical Exam:  General: NAD; A&Ox3,  hemiopsia improving  Cardiac: S1/S2, RRR, no murmur, no rubs  Lungs: unlabored shallow respirations, b/l bs decreased at bases  Abdomen: Soft/NT/ND;   Sternum: Intact, no click, incision healing well with no drainage  Incisions: Incisions clean/dry/intact  Extremities: No edema b/l lower extremities;   Neuro: moves all extremities    Central Venous Catheter: Yes[]  fresh post op with recent CVA  Webber Catheter: Yes  [] , strict I and o  EPICARDIAL WIRES:  [] YES [  BOWEL MOVEMENT: [] NO,         LABS:                        9.9<L>  9.46  )-----------( 98<L>    ( 20 Mar 2020 02:00 )             29.4<L>                        9.3<L>  7.14  )-----------( 94<L>    ( 19 Mar 2020 02:00 )             27.7<L>    03-20    136  |  100  |  14  ----------------------------<  84  3.9   |  22  |  0.8  03-19    141  |  108  |  10  ----------------------------<  130<H>  4.4   |  20  |  0.9    Ca    8.1<L>      20 Mar 2020 02:00  Mg     2.4     03-20    TPro  5.7<L> [6.0 - 8.0]  /  Alb  4.7 [3.5 - 5.2]  /  TBili  4.2<H> [0.2 - 1.2]  /  DBili  x   /  AST  40 [0 - 41]  /  ALT  27 [0 - 41]  /  AlkPhos  34 [30 - 115]  03-20    PT/INR - ( 20 Mar 2020 02:00 )   PT: ;   INR: 1.28 ratio       PT/INR - ( 19 Mar 2020 02:00 )   PT: ;   INR: 1.22 ratio       PTT - ( 20 Mar 2020 02:00 )  PTT:32.7 sec, PTT - ( 19 Mar 2020 02:00 )  PTT:32.6 sec    ABG - ( 20 Mar 2020 04:54 )  pH: 7.37  /  pCO2: 38    /  pO2: 75    / HCO3: 22    / Base Excess: -3.0  /  SaO2: 95    /  LA: 1.0      RADIOLOGY & ADDITIONAL TESTS:  CXR:  < from: Xray Chest 1 View- PORTABLE-Routine (03.20.20 @ 04:58) >  No significant change since one day earlier.    < from: CT Angio Neck w/ IV Cont (03.20.20 @ 11:16) >  IMPRESSION:     1.  No evidence of acute large vessel occlusion.    2.  Mild stenosis of the right MCA proximal M1 segment. Moderate stenosis of the right PCA distal P2 segment.    < end of copied text >  < from: US Abdomen Limited (03.20.20 @ 10:12) >  Echogenic hepatic parenchyma which may be secondary to fatty infiltration or hepatocellular disease.    Right pleural effusion    EKG: < from: 12 Lead ECG (03.19.20 @ 08:14) >  Diagnosis Line Normal sinus rhythm  Right bundle branch block  Abnormal ECG    < end of copied text >    MEDICATIONS  (STANDING):  albuterol/ipratropium for Nebulization 3 milliLiter(s) Nebulizer every 6 hours  aspirin 325 milliGRAM(s) Oral daily  atorvastatin 40 milliGRAM(s) Oral at bedtime  benzocaine 20% Spray 1 Spray(s) Topical every 4 hours  chlorhexidine 4% Liquid 1 Application(s) Topical <User Schedule>  dextrose 50% Injectable 50 milliLiter(s) IV Push every 15 minutes  dextrose 50% Injectable 25 milliLiter(s) IV Push every 15 minutes  famotidine    Tablet 20 milliGRAM(s) Oral two times a day  guaiFENesin  milliGRAM(s) Oral every 12 hours  heparin  Injectable 5000 Unit(s) SubCutaneous every 12 hours  influenza   Vaccine 0.5 milliLiter(s) IntraMuscular once  insulin regular Infusion 1 Unit(s)/Hr (1 mL/Hr) IV Continuous <Continuous>  magnesium sulfate  IVPB 1 Gram(s) IV Intermittent every 12 hours  norepinephrine Infusion 0.05 MICROgram(s)/kG/Min (7.74 mL/Hr) IV Continuous <Continuous>  polyethylene glycol 3350 17 Gram(s) Oral daily  sodium chloride 0.9%. 1000 milliLiter(s) (20 mL/Hr) IV Continuous <Continuous>  vasopressin Infusion 0.02 Unit(s)/Min (1.2 mL/Hr) IV Continuous <Continuous>    MEDICATIONS  (PRN):  glucagon  Injectable 1 milliGRAM(s) IntraMuscular once PRN Glucose LESS THAN 70 milligrams/deciliter  ondansetron  IVPB 4 milliGRAM(s) IV Intermittent once PRN Nausea and/or Vomiting  oxyCODONE    IR 10 milliGRAM(s) Oral every 4 hours PRN Severe Pain (7 - 10)  oxyCODONE    IR 5 milliGRAM(s) Oral every 4 hours PRN Moderate Pain (4 - 6)    Allergies    No Known Allergies    Intolerances  Ambulation/Activity Status:    Assessment/Plan:  59y Male status-post CABG with  CVA ruled out by CT and CTA of head and neck.  - Case and plan discussed with CTU Intensivist and CT Surgeon - Dr. Yadav/Keyur/Gorge   - Continue CTU supportive care    - Continue DVT/GI prophylaxis  - Incentive Spirometry 10 times an hour  - Continue to advance physical activity as tolerated and continue PT/OT as directed  1. CAD: Continue ASA, statin,  - Start metoprolol 12.5 mg BID  - start Isordil 5 mg q8h  - stop levo and vaso  - continue nitro drip for hypertension control  - start Lantus 20 and continue insulin gtt  - maintain diet  - repeat LFt's in AM 2/2 rising bilirubin  - right uper quad sono revealed fatty live and   - will continue webber for I and D  - start Plavix as per Dr. Yadav  - maintain normal fluid balance  - neurology note appreciated      Social Service Disposition:  home next week

## 2020-03-20 NOTE — PROVIDER CONTACT NOTE (CHANGE IN STATUS NOTIFICATION) - ACTION/TREATMENT ORDERED:
Dr. Edouard, JEY Crump and JEY Broderick at bedside examining patient. Stroke code called. Patient taken to CT scan. Neuro team at bedside.

## 2020-03-20 NOTE — CONSULT NOTE ADULT - ASSESSMENT
A 59 years old right handed gentleman who recently underwent s/p CABG 2 days ago, developed HA, AMS and right homonymous hemianopsia and Stroke Code was called earlier today. Initial NIHSS 5 STAT CT head reports of no intracranial pathology. Consulted by Dr. Packer for possible neuroendovascular intervention, and when patient seen at bedside noted to have worsening confusion with repeat NIHSS 9. CTA head/neck reports of no LVO, and mild stenosis of right M1 segment.     SUGGESTIONS:  - No neuroendovascular intervention is warranted at this   - Continue current medical management per primary team    Thank you for consult, will sign off.   Updated plan with primary CTU PA x [phone]  Case discussed and patient seen with Neuroendovascular attending physician   Bella Fung NP  [phone] A 59 years old right handed gentleman who recently underwent s/p CABG 2 days ago, developed HA, AMS and right homonymous hemianopsia and Stroke Code was called earlier today. Initial NIHSS 5 STAT CT head reports of no intracranial pathology. Consulted by Dr. Packer for possible neuroendovascular intervention, and when patient seen at bedside noted to have worsening confusion with new NIHSS of 9. CTA head/neck reports of no LVO, and mild stenosis of right M1 segment.     SUGGESTIONS:  - No neuroendovascular intervention is warranted at this time.  - Avoid dehydration, hypovolemia and hypotension.   - Medical management per primary team  - Stroke management per Neurology/Stroke team.    Thank you for consult, will sign off.   Updated plan with primary CTU PA x [phone]  Case discussed and patient seen with Neuroendovascular attending physician   Bella Fung NP  [phone]

## 2020-03-20 NOTE — CONSULT NOTE ADULT - SUBJECTIVE AND OBJECTIVE BOX
NEUROENDOVASCULAR CONSULT NOTE    Consulted by Dr. Packer for neuroendovascular evaluation of possible stroke intervention and patient with right hemianopsia.    Patient is a 59y old  Male who presents with a chief complaint of coronary artery disease with unstable angina (18 Mar 2020 07:59)    HPI: This is a 59 years old right handed gentleman with   59 yrs old with Hx of CAD, D , DL, HTN is here for elective LHC. Patient c/o progressive chest pain on exertion.  Pt had abnormal stress test. (16 Mar 2020 10:28)      PAST MEDICAL & SURGICAL HISTORY:  CAD (coronary artery disease): s/p PTCA WITH 2 STENTS  H/O hyperlipidemia  H/O: HTN (hypertension)  DM2 (diabetes mellitus, type 2)  History of tonsillectomy  H/O knee surgery      FAMILY HISTORY:    Denies any personal or familial history of aneurysm, chronic infectious/inflammatory disease, connective tissue disease (Ruel-Danlos syndrome or Marfan syndrome), PCKD    SOCIAL HISTORY:  Smoking/ETOH/Drugs:    Allergies    No Known Allergies    Intolerances        REVIEW OF SYSTEMS  Constitutional: No fever, weight loss or fatigue  Eyes: No eye pain, visual disturbances, or discharge  ENMT:  No difficulty hearing, tinnitus, vertigo; No sinus or throat pain  Neck: No pain or stiffness  Respiratory: No cough, wheezing, chills or hemoptysis  Cardiovascular: No chest pain, palpitations, shortness of breath, dizziness or leg swelling  Gastrointestinal: No abdominal pain. No nausea, vomiting or hematemesis; No diarrhea or constipation. Nohematochezia.  Genitourinary: No dysuria, frequency, hematuria or incontinence  Neurological: As per HPI  Skin: No itching, burning, rashes or lesions   Endocrine: No heat or cold intolerance; No hair loss  Musculoskeletal: No joint pain or swelling; No muscle, back or extremity pain  Psychiatric: No depression, anxiety, mood swings or difficulty sleeping  Heme/Lymph: No easy bruising or bleeding gums    Unable to obtain review of systems due to:    PHYSICAL EXAM:    Vital Signs Last 24 Hrs  T(C): 36.8 (20 Mar 2020 12:00), Max: 37.5 (20 Mar 2020 01:00)  T(F): 98.2 (20 Mar 2020 12:00), Max: 99.5 (20 Mar 2020 01:00)  HR: 95 (20 Mar 2020 13:00) (81 - 101)  BP: 138/76 (20 Mar 2020 08:30) (119/77 - 156/91)  BP(mean): 101 (20 Mar 2020 08:30) (93 - 118)  RR: 18 (20 Mar 2020 13:00) (17 - 58)  SpO2: 98% (20 Mar 2020 13:00) (94% - 100%)    Neurologic Exam:  Mental status: Awake, alert and oriented x3.  Recent and remote memory intact.  Naming, repetition and comprehension intact.  Attention/concentration intact.  No dysarthria, no aphasia.  Fund of knowledge appropriate.    Cranial nerves: Fundoscopic exam demonstrated no abnormalities, pupils equally round and reactive to light, visual fields full, no nystagmus, extraocular muscles intact, V1 through V3 intact bilaterally and symmetric, face symmetric, hearing intact to finger rub, palate elevation symmetric, tongue was midline, sternocleidomastoid/shoulder shrug strength bilaterally 5/5.    Motor:  Normal bulk and tone, strength 5/5 in bilateral upper and lower extremities.   strength 5/5.  Rapid alternating movements intact and symmetric.   Sensation: Intact to light touch, proprioception, and pinprick.  No neglect.   Coordination: No dysmetria on finger-to-nose and heel-to-shin.  No clumsiness.  Reflexes: 2+ in upper and lower extremities, downgoing toes bilaterally  Gait: Narrow and steady. No ataxia.  Romberg negative    Cardio: +S1S2 No M/R/G, No JVD  Pulm: CTA B/L no W/R/R  Skin: Warm, Dry, Capillary refill <2 seconds, good skin turgor  Abd: Soft, NTND  Ext: no c/c/e, 2+ pulses throughout    NIH STROKE SCALE  Item	                                                        Score  1 a.	Level of Consciousness	               	0  1 b. LOC Questions	                                0  1 c.	LOC Commands	                               	0  2.	Best Gaze	                                        0  3.	Visual	                                                0  4.	Facial Palsy	                                        0  5 a.	Motor Arm - Left	                                0  5 b.	Motor Arm - Right	                        0  6 a.	Motor Leg - Left	                                0  6 b.	Motor Leg - Right	                                0  7.	Limb Ataxia	                                        0  8.	Sensory	                                                0  9.	Language	                                        0  10.	Dysarthria	                                        0  11.	Extinction and Inattention  	        0  ______________________________________  TOTAL	                                                        0    mRS:  0 No symptoms at all  1 No significant disability despite symptoms; able to carry out all usual duties and activities without assistance  2 Slight disability; unable to carry out all previous activities, but able to look after own affairs  3 Moderate disability; requiring some help, but able to walk without assistance  4 Moderately severe disability; unable to walk without assistance and unable to attend to own bodily needs without assistance  5 Severe disability; bedridden, incontinent and requiring constant nursing care and attention  6 Dead    GCS:        ICHs:  Age >=80  GCS Score: 3-4 (2 pts)   GCS Score: 5-12 (1 pt)   ICH Volume: >30  (+) IVH  (+) Infratentorial    Estimated 30-day mortality  0 points: 0%  1 point: 13%  2 points: 26%  3 points: 72%  4 points: 97%  5 points: 100%  6 points: 100%    Hunt&Craig:  Grade I = Asymptomatic, mild headache, slight nuchal rigidity  Grade II = Moderate to severe headache, nuchal rigidity, no neurological deficit other than cranial nerve palsy  Grade III = Drowiness, confusion, mild focal neurological deficit  Grade IV = Stupor, moderate to severe hemiparesis  Grade V = Coma, decerebrate posturing    m-Washington:  Grade 0   (No Subarachnoid Hemorrhage (SAH)), No intraventricular hemorrhage (IVH), Incidence of symptomatic vasopasm 0%  Grade 1   (Focal or diffuse, thin SAH), No IVH, incidence of symptomatic vasospasm 24%  Grade 2   (Thin focal or diffuse SAH), IVH present, incidence of symptomatic vasospasm 33%  Grade 3   (Thick focal or diffuse SAH), No IVH, incidence of symptomatic vasospasm 33%  Grade 4   (Thick focal or diffuse SAH), IVH present, incidence of symptomatic vasosasm 40%    MEDICATIONS  (STANDING):  albumin human  5% IVPB 500 milliLiter(s) IV Intermittent once  albuterol/ipratropium for Nebulization 3 milliLiter(s) Nebulizer every 6 hours  atorvastatin 40 milliGRAM(s) Oral at bedtime  benzocaine 20% Spray 1 Spray(s) Topical every 4 hours  chlorhexidine 4% Liquid 1 Application(s) Topical <User Schedule>  clopidogrel Tablet 75 milliGRAM(s) Oral daily  dextrose 50% Injectable 50 milliLiter(s) IV Push every 15 minutes  dextrose 50% Injectable 25 milliLiter(s) IV Push every 15 minutes  famotidine    Tablet 20 milliGRAM(s) Oral two times a day  guaiFENesin  milliGRAM(s) Oral every 12 hours  heparin  Injectable 5000 Unit(s) SubCutaneous every 12 hours  influenza   Vaccine 0.5 milliLiter(s) IntraMuscular once  insulin glargine Injectable (LANTUS) 20 Unit(s) SubCutaneous every morning  insulin regular Infusion 1 Unit(s)/Hr IV Continuous <Continuous>  isosorbide   dinitrate Tablet (ISORDIL) 5 milliGRAM(s) Oral three times a day  magnesium sulfate  IVPB 1 Gram(s) IV Intermittent every 12 hours  metoprolol tartrate 12.5 milliGRAM(s) Oral two times a day  nitroglycerin  Infusion 5 MICROgram(s)/Min IV Continuous <Continuous>  norepinephrine Infusion 0.05 MICROgram(s)/kG/Min IV Continuous <Continuous>  polyethylene glycol 3350 17 Gram(s) Oral daily  sodium chloride 0.9%. 1000 milliLiter(s) IV Continuous <Continuous>  vasopressin Infusion 0.02 Unit(s)/Min IV Continuous <Continuous>      LABS                          9.9    9.46  )-----------( 98       ( 20 Mar 2020 02:00 )             29.4     03-20    139  |  103  |  10  ----------------------------<  149<H>  3.7   |  23  |  0.8    Ca    8.0<L>      20 Mar 2020 12:25  Mg     2.3     03-20    TPro  5.7<L>  /  Alb  4.7  /  TBili  4.2<H>  /  DBili  x   /  AST  40  /  ALT  27  /  AlkPhos  34  03-20    PT/INR - ( 20 Mar 2020 02:00 )   PT: 14.70 sec;   INR: 1.28 ratio         PTT - ( 20 Mar 2020 02:00 )  PTT:32.7 sec  LIVER FUNCTIONS - ( 20 Mar 2020 02:00 )  Alb: 4.7 g/dL / Pro: 5.7 g/dL / ALK PHOS: 34 U/L / ALT: 27 U/L / AST: 40 U/L / GGT: x               Blood Culture:       RADIOLOGY/EKG: NEUROENDOVASCULAR CONSULT NOTE    Consulted by Dr. Packer for neuroendovascular evaluation of possible stroke intervention and patient with right hemianopsia.    Patient is a 59y old male who presents with a chief complaint of coronary artery disease with unstable angina (18 Mar 2020 07:59)    HPI: This is a 59 years old right handed gentleman with past medical history HTN, DM, CAD, MI 10 years ago, s/p PCI, stents x 2, CABG, COPD on BIPAP is with progressive chest pain on exertion and was found to have abnormal stress test, and underwent s/p CABG on 3/18/2020. Stroke Code was called this morning for sudden AMS and patient with complaint with visual changes. His LKW 4 am this morning, after walking, patient developed headache, and then developed AMS. His initial NIHSS: 5 for gaze, right hemianopsia, and inattention. STAT CT head ordered but no acute intracranial pathology noted. Patient was not a candidate for IV-tPA. Consulted by Dr. Packer for neuroendovascular evaluation for possible stroke intervention.     N, CAD, MI 10 years ago, s/p PCI stent x 2, with chest pressure and SOB at night for last 3-4 months, had abnormal stress test, now presents for elective cardiac cath which revealed 2vCAD.     59 yrs old with Hx of CAD, D , DL, HTN is here for elective LHC. Patient c/o progressive chest pain on exertion.  Pt had abnormal stress test    ·  PRE-OP DIAGNOSIS:  CAD in native artery 18-Mar-2020 14:29:10  August Crump  ·  POST-OP DIAGNOSIS:  CAD in native artery 18-Mar-2020 14:29:25  August Crump  ·  PROCEDURES:  CABG, using radial artery graft 18-Mar-2020 14:27:51 LIMA to LAD, RSVG to DIAG, LRAD to OM August Crump.     59 yrs old with Hx of CAD, D , DL, HTN is here for elective LHC. Patient c/o progressive chest pain on exertion.  Pt had abnormal stress test. (16 Mar 2020 10:28)      PAST MEDICAL & SURGICAL HISTORY:  CAD (coronary artery disease): s/p PTCA WITH 2 STENTS  H/O hyperlipidemia  H/O: HTN (hypertension)  DM2 (diabetes mellitus, type 2)  History of tonsillectomy  H/O knee surgery      FAMILY HISTORY:    Denies any personal or familial history of aneurysm, chronic infectious/inflammatory disease, connective tissue disease (Ruel-Danlos syndrome or Marfan syndrome), PCKD    SOCIAL HISTORY:  Smoking/ETOH/Drugs:    Allergies    No Known Allergies    Intolerances        REVIEW OF SYSTEMS  Constitutional: No fever, weight loss or fatigue  Eyes: No eye pain, visual disturbances, or discharge  ENMT:  No difficulty hearing, tinnitus, vertigo; No sinus or throat pain  Neck: No pain or stiffness  Respiratory: No cough, wheezing, chills or hemoptysis  Cardiovascular: No chest pain, palpitations, shortness of breath, dizziness or leg swelling  Gastrointestinal: No abdominal pain. No nausea, vomiting or hematemesis; No diarrhea or constipation. Nohematochezia.  Genitourinary: No dysuria, frequency, hematuria or incontinence  Neurological: As per HPI  Skin: No itching, burning, rashes or lesions   Endocrine: No heat or cold intolerance; No hair loss  Musculoskeletal: No joint pain or swelling; No muscle, back or extremity pain  Psychiatric: No depression, anxiety, mood swings or difficulty sleeping  Heme/Lymph: No easy bruising or bleeding gums    Unable to obtain review of systems due to:    PHYSICAL EXAM:    Vital Signs Last 24 Hrs  T(C): 36.8 (20 Mar 2020 12:00), Max: 37.5 (20 Mar 2020 01:00)  T(F): 98.2 (20 Mar 2020 12:00), Max: 99.5 (20 Mar 2020 01:00)  HR: 95 (20 Mar 2020 13:00) (81 - 101)  BP: 138/76 (20 Mar 2020 08:30) (119/77 - 156/91)  BP(mean): 101 (20 Mar 2020 08:30) (93 - 118)  RR: 18 (20 Mar 2020 13:00) (17 - 58)  SpO2: 98% (20 Mar 2020 13:00) (94% - 100%)    Neurologic Exam:  Mental status: Awake, alert and oriented x3.  Recent and remote memory intact.  Naming, repetition and comprehension intact.  Attention/concentration intact.  No dysarthria, no aphasia.  Fund of knowledge appropriate.    Cranial nerves: Fundoscopic exam demonstrated no abnormalities, pupils equally round and reactive to light, visual fields full, no nystagmus, extraocular muscles intact, V1 through V3 intact bilaterally and symmetric, face symmetric, hearing intact to finger rub, palate elevation symmetric, tongue was midline, sternocleidomastoid/shoulder shrug strength bilaterally 5/5.    Motor:  Normal bulk and tone, strength 5/5 in bilateral upper and lower extremities.   strength 5/5.  Rapid alternating movements intact and symmetric.   Sensation: Intact to light touch, proprioception, and pinprick.  No neglect.   Coordination: No dysmetria on finger-to-nose and heel-to-shin.  No clumsiness.  Reflexes: 2+ in upper and lower extremities, downgoing toes bilaterally  Gait: Narrow and steady. No ataxia.  Romberg negative    Cardio: +S1S2 No M/R/G, No JVD  Pulm: CTA B/L no W/R/R  Skin: Warm, Dry, Capillary refill <2 seconds, good skin turgor  Abd: Soft, NTND  Ext: no c/c/e, 2+ pulses throughout    NIH STROKE SCALE  Item	                                                        Score  1 a.	Level of Consciousness	               	0  1 b. LOC Questions	                                0  1 c.	LOC Commands	                               	0  2.	Best Gaze	                                        0  3.	Visual	                                                0  4.	Facial Palsy	                                        0  5 a.	Motor Arm - Left	                                0  5 b.	Motor Arm - Right	                        0  6 a.	Motor Leg - Left	                                0  6 b.	Motor Leg - Right	                                0  7.	Limb Ataxia	                                        0  8.	Sensory	                                                0  9.	Language	                                        0  10.	Dysarthria	                                        0  11.	Extinction and Inattention  	        0  ______________________________________  TOTAL	                                                        0    mRS:  0 No symptoms at all  1 No significant disability despite symptoms; able to carry out all usual duties and activities without assistance  2 Slight disability; unable to carry out all previous activities, but able to look after own affairs  3 Moderate disability; requiring some help, but able to walk without assistance  4 Moderately severe disability; unable to walk without assistance and unable to attend to own bodily needs without assistance  5 Severe disability; bedridden, incontinent and requiring constant nursing care and attention  6 Dead    GCS:        ICHs:  Age >=80  GCS Score: 3-4 (2 pts)   GCS Score: 5-12 (1 pt)   ICH Volume: >30  (+) IVH  (+) Infratentorial    Estimated 30-day mortality  0 points: 0%  1 point: 13%  2 points: 26%  3 points: 72%  4 points: 97%  5 points: 100%  6 points: 100%    Hunt&Craig:  Grade I = Asymptomatic, mild headache, slight nuchal rigidity  Grade II = Moderate to severe headache, nuchal rigidity, no neurological deficit other than cranial nerve palsy  Grade III = Drowiness, confusion, mild focal neurological deficit  Grade IV = Stupor, moderate to severe hemiparesis  Grade V = Coma, decerebrate posturing    m-Washington:  Grade 0   (No Subarachnoid Hemorrhage (SAH)), No intraventricular hemorrhage (IVH), Incidence of symptomatic vasopasm 0%  Grade 1   (Focal or diffuse, thin SAH), No IVH, incidence of symptomatic vasospasm 24%  Grade 2   (Thin focal or diffuse SAH), IVH present, incidence of symptomatic vasospasm 33%  Grade 3   (Thick focal or diffuse SAH), No IVH, incidence of symptomatic vasospasm 33%  Grade 4   (Thick focal or diffuse SAH), IVH present, incidence of symptomatic vasosasm 40%    MEDICATIONS  (STANDING):  albumin human  5% IVPB 500 milliLiter(s) IV Intermittent once  albuterol/ipratropium for Nebulization 3 milliLiter(s) Nebulizer every 6 hours  atorvastatin 40 milliGRAM(s) Oral at bedtime  benzocaine 20% Spray 1 Spray(s) Topical every 4 hours  chlorhexidine 4% Liquid 1 Application(s) Topical <User Schedule>  clopidogrel Tablet 75 milliGRAM(s) Oral daily  dextrose 50% Injectable 50 milliLiter(s) IV Push every 15 minutes  dextrose 50% Injectable 25 milliLiter(s) IV Push every 15 minutes  famotidine    Tablet 20 milliGRAM(s) Oral two times a day  guaiFENesin  milliGRAM(s) Oral every 12 hours  heparin  Injectable 5000 Unit(s) SubCutaneous every 12 hours  influenza   Vaccine 0.5 milliLiter(s) IntraMuscular once  insulin glargine Injectable (LANTUS) 20 Unit(s) SubCutaneous every morning  insulin regular Infusion 1 Unit(s)/Hr IV Continuous <Continuous>  isosorbide   dinitrate Tablet (ISORDIL) 5 milliGRAM(s) Oral three times a day  magnesium sulfate  IVPB 1 Gram(s) IV Intermittent every 12 hours  metoprolol tartrate 12.5 milliGRAM(s) Oral two times a day  nitroglycerin  Infusion 5 MICROgram(s)/Min IV Continuous <Continuous>  norepinephrine Infusion 0.05 MICROgram(s)/kG/Min IV Continuous <Continuous>  polyethylene glycol 3350 17 Gram(s) Oral daily  sodium chloride 0.9%. 1000 milliLiter(s) IV Continuous <Continuous>  vasopressin Infusion 0.02 Unit(s)/Min IV Continuous <Continuous>      LABS                          9.9    9.46  )-----------( 98       ( 20 Mar 2020 02:00 )             29.4     03-20    139  |  103  |  10  ----------------------------<  149<H>  3.7   |  23  |  0.8    Ca    8.0<L>      20 Mar 2020 12:25  Mg     2.3     03-20    TPro  5.7<L>  /  Alb  4.7  /  TBili  4.2<H>  /  DBili  x   /  AST  40  /  ALT  27  /  AlkPhos  34  03-20    PT/INR - ( 20 Mar 2020 02:00 )   PT: 14.70 sec;   INR: 1.28 ratio         PTT - ( 20 Mar 2020 02:00 )  PTT:32.7 sec  LIVER FUNCTIONS - ( 20 Mar 2020 02:00 )  Alb: 4.7 g/dL / Pro: 5.7 g/dL / ALK PHOS: 34 U/L / ALT: 27 U/L / AST: 40 U/L / GGT: x               Blood Culture:       RADIOLOGY/EKG: NEUROENDOVASCULAR CONSULT NOTE    Consulted by Dr. Packer for neuroendovascular evaluation of possible stroke intervention and patient with right hemianopsia.    Patient is a 59y old male who presents with a chief complaint of coronary artery disease with unstable angina (18 Mar 2020 07:59)    HPI: This is a 59 years old right handed gentleman with past medical history HTN, DM, CAD, MI 10 years ago, s/p PCI, stents x 2, CABG, COPD on BIPAP is with progressive chest pain on exertion and was found to have abnormal stress test, and underwent s/p CABG on 3/18/2020. Stroke Code was called this morning for sudden AMS and patient with complaint with visual changes. His LKW 4 am this morning, after walking, patient developed headache, and then developed AMS. His initial NIHSS: 5 for gaze, right hemianopsia, and inattention. STAT CT head ordered but no acute intracranial pathology noted. Patient was not a candidate for IV-tPA. Consulted by Dr. Packer for neuroendovascular evaluation for possible stroke intervention.     3/20/2020: Pt seen at bedside, worsening Neuro exam noted from NIHSS 5 to 9. CODE NI Alert was activated and STAT CTA head/neck recommended to Dr. Packer.     PAST MEDICAL & SURGICAL HISTORY:  CAD (coronary artery disease): s/p PTCA WITH 2 STENTS  H/O hyperlipidemia  H/O: HTN (hypertension)  DM2 (diabetes mellitus, type 2)  History of tonsillectomy  H/O knee surgery    Allergies  No Known Allergies  Intolerances    REVIEW OF SYSTEMS  Constitutional: No fever  Eyes: + visual disturbances  ENMT:  No difficulty hearing, tinnitus, vertigo; No sinus or throat pain  Neck: No pain or stiffness  Respiratory: No cough, wheezing, chills or hemoptysis  Cardiovascular: No chest pain, palpitations, shortness of breath, dizziness or leg swelling  Gastrointestinal: No abdominal pain. No nausea, vomiting. No diarrhea or constipation.  Genitourinary: No dysuria, frequency, hematuria or incontinence  Neurological: As per HPI  Skin: No itching, burning, rashes or lesions   Musculoskeletal: No joint pain or swelling; No muscle, back or extremity pain  Heme/Lymph: No easy bruising or bleeding gums    PHYSICAL EXAM:    Vital Signs Last 24 Hrs  T(C): 36.8 (20 Mar 2020 12:00), Max: 37.5 (20 Mar 2020 01:00)  T(F): 98.2 (20 Mar 2020 12:00), Max: 99.5 (20 Mar 2020 01:00)  HR: 95 (20 Mar 2020 13:00) (81 - 101)  BP: 138/76 (20 Mar 2020 08:30) (119/77 - 156/91)  BP(mean): 101 (20 Mar 2020 08:30) (93 - 118)  RR: 18 (20 Mar 2020 13:00) (17 - 58)  SpO2: 98% (20 Mar 2020 13:00) (94% - 100%)    Neurologic Exam:  Mental status: Awake, alert and oriented to self, cannot report current month and current president, and place. Naming, repetition intact. No dysarthria, no aphasia.   Cranial nerves: Blinks to threat on left, but not to right. + left gaze preference, RHH, face symmetric  Motor: No drifting in all extremities  Sensation: No neglect, withdraws to painful stimuli symmetrically   Coordination: No dysmetria on finger-to-nose    NIH STROKE SCALE  Item	                                                        Score  1 a.	Level of Consciousness	               	0  1 b. LOC Questions	                                    2  1 c.	LOC Commands	                               	2  2.	Best Gaze	                                    1  3.	Visual	                                                2  4.	Facial Palsy	                                        0  5 a.	Motor Arm - Left	                                0  5 b.	Motor Arm - Right	                        0  6 a.	Motor Leg - Left	                                0  6 b.	Motor Leg - Right	                                0  7.	Limb Ataxia	                                        0  8.	Sensory	                                                0  9.	Language	                                        0  10.	Dysarthria	                                        0  11.	Extinction and Inattention  	        2  ______________________________________  TOTAL	                                                        9    mRS: baseline 0 No symptoms at all    MEDICATIONS  (STANDING):  albumin human  5% IVPB 500 milliLiter(s) IV Intermittent once  albuterol/ipratropium for Nebulization 3 milliLiter(s) Nebulizer every 6 hours  atorvastatin 40 milliGRAM(s) Oral at bedtime  benzocaine 20% Spray 1 Spray(s) Topical every 4 hours  chlorhexidine 4% Liquid 1 Application(s) Topical <User Schedule>  clopidogrel Tablet 75 milliGRAM(s) Oral daily  dextrose 50% Injectable 50 milliLiter(s) IV Push every 15 minutes  dextrose 50% Injectable 25 milliLiter(s) IV Push every 15 minutes  famotidine    Tablet 20 milliGRAM(s) Oral two times a day  guaiFENesin  milliGRAM(s) Oral every 12 hours  heparin  Injectable 5000 Unit(s) SubCutaneous every 12 hours  influenza   Vaccine 0.5 milliLiter(s) IntraMuscular once  insulin glargine Injectable (LANTUS) 20 Unit(s) SubCutaneous every morning  insulin regular Infusion 1 Unit(s)/Hr IV Continuous <Continuous>  isosorbide   dinitrate Tablet (ISORDIL) 5 milliGRAM(s) Oral three times a day  magnesium sulfate  IVPB 1 Gram(s) IV Intermittent every 12 hours  metoprolol tartrate 12.5 milliGRAM(s) Oral two times a day  nitroglycerin  Infusion 5 MICROgram(s)/Min IV Continuous <Continuous>  norepinephrine Infusion 0.05 MICROgram(s)/kG/Min IV Continuous <Continuous>  polyethylene glycol 3350 17 Gram(s) Oral daily  sodium chloride 0.9%. 1000 milliLiter(s) IV Continuous <Continuous>  vasopressin Infusion 0.02 Unit(s)/Min IV Continuous <Continuous>    LABS                     9.9    9.46  )-----------( 98       ( 20 Mar 2020 02:00 )             29.4     03-20    139  |  103  |  10  ----------------------------<  149<H>  3.7   |  23  |  0.8    Ca    8.0<L>      20 Mar 2020 12:25  Mg     2.3     03-20    TPro  5.7<L>  /  Alb  4.7  /  TBili  4.2<H>  /  DBili  x   /  AST  40  /  ALT  27  /  AlkPhos  34  03-20    PT/INR - ( 20 Mar 2020 02:00 )   PT: 14.70 sec;   INR: 1.28 ratio    PTT - ( 20 Mar 2020 02:00 )  PTT:32.7 sec      RADIOLOGY/EKG:    < from: CT Angio Neck w/ IV Cont (03.20.20 @ 11:16) >    Findings:     NECK:  The visualized aortic arch and great vessel origins are patent.  The common, internal and external carotid arteries are patent.  The vertebral arteries are patent.    HEAD:  The distal internal carotid arteries are patent. The anterior and middle cerebral arteries arepatent. There is mild stenosis of the right MCA M1 segment.    The distal vertebral arteries are patent.  The basilar artery is patent. There is moderate stenosis of the distal P2 segment of the right posterior cerebral artery.    OTHER:  Mild cervical spine degenerative changes.  Right neck hemodialysis catheter.  Small bilateral pleural effusions with subjacent compressive atelectasis.    IMPRESSION:   1.  No evidence of acute large vessel occlusion.  2.  Mild stenosis of the right MCA proximal M1 segment. Moderate stenosis of the right PCA distal P2 segment.    < from: CT Brain Stroke Protocol (03.20.20 @ 08:23) >    IMPRESSION:   No CT evidence of acute intracranial hemorrhage or large territory infarct. Motion artifact.

## 2020-03-20 NOTE — CHART NOTE - NSCHARTNOTEFT_GEN_A_CORE
Patient states that he has not slept well since surgery and is tired . Complaining of frontal headache and some visual disturbances which according to patient is better since this am. He is very hungry.  Alert and oriented  x3 at this time. Moving all extremities and motor and sensory system is intact at this time. Reviewed the CTA of brain in detail w Dr Krishnamurthy. Did not show any occlusion of major artery. Impression is maybe TIA which is rapidly improving. continue hydration, keep BP elevated and continue antiplatelets. will feed as he is very hungry, let him rest and then ambulate.

## 2020-03-20 NOTE — STROKE CODE NOTE - DISPOSITION
Other/Not a candidate for IV-tPA, however he is with measurable focal neurological deficit and may be candidate for neuroendovascular intervention but will need a vessel imaging. During Stroke Code, we recommended CTP however per CT surgeon Dr. Packer will defer CTP since concerned of patient's renal function however, SCr 0.8.

## 2020-03-20 NOTE — PROVIDER CONTACT NOTE (CHANGE IN STATUS NOTIFICATION) - SITUATION
Patient with change in mental status, confused, disoriented to time, place and situation. Patient complaining of blurry vision and headache.

## 2020-03-20 NOTE — PROGRESS NOTE ADULT - SUBJECTIVE AND OBJECTIVE BOX
CTU Attending Progress Daily Note     20 Mar 2020 08:59    Procedure:        CABG                                          POD#               3    Patient seen as post-op critical care follow-up    HPI:  59 yrs old with Hx of CAD, D , DL, HTN is here for elective LHC. Patient c/o progressive chest pain on exertion.  Pt had abnormal stress test. (16 Mar 2020 10:28)    See preop testing chart H&P    Interval event for past 24 hr:  FATIMAH EASTMAN  59y had HTN    Current Complains:  FATIMAH EASTMAN has new complaints of headache    REVIEW OF SYSTEMS:  CONSTITUTIONAL:  [-] weakness, [-] fevers, [-] chills  EYES/ENT: [-] visual changes, [-] vertigo, [-] throat pain   NECK: [-] pain, [-] stiffness  RESPIRATORY: [-] cough, [-] wheezing, [-] hemoptysis, [-] shortness of breath  CARDIOVASCULAR: [-] chest pain, [-] palpitations, [-] orthopnea  GASTROINTESTINAL:    [-]abdominal pain, [-] nausea, [-] vomiting, [-] hematemesis, [-] diarrhea, [-] constipation, [-] melena, [-] hematochezia.  GENITOURINARY: [-] dysuria, [-] frequency, [-] hematuria  NEUROLOGICAL: [-] numbness, [-] weakness  SKIN: [-] itching, [-] burning, [-] rashes, [-] lesions   All other review of systems is negative unless indicated above.    [  ] Unable to assess ROS because :    OBJECTIVE:  ICU Vital Signs Last 24 Hrs  T(C): 37.1 (20 Mar 2020 06:00), Max: 37.5 (20 Mar 2020 01:00)  T(F): 98.7 (20 Mar 2020 06:00), Max: 99.5 (20 Mar 2020 01:00)  HR: 93 (20 Mar 2020 07:00) (81 - 101)  BP: 156/91 (20 Mar 2020 07:00) (119/77 - 156/91)  BP(mean): 118 (20 Mar 2020 07:00) (93 - 118)  ABP: 161/77 (20 Mar 2020 07:00) (34/34 - 226/179)  ABP(mean): 112 (20 Mar 2020 07:00) (34 - 210)  RR: 36 (20 Mar 2020 05:30) (15 - 58)  SpO2: 97% (20 Mar 2020 07:00) (94% - 100%)      I&O's Summary    19 Mar 2020 07:01  -  20 Mar 2020 07:00  --------------------------------------------------------  IN: 1928 mL / OUT: 1597 mL / NET: 331 mL    20 Mar 2020 07:01  -  20 Mar 2020 08:59  --------------------------------------------------------  IN: 43 mL / OUT: 23 mL / NET: 20 mL      Adult Advanced Hemodynamics Last 24 Hrs  CVP(mm Hg): 15 (19 Mar 2020 09:00) (15 - 15)  CVP(cm H2O): --  CO: --  CI: --  PA: 45/13 (19 Mar 2020 09:00) (45/13 - 45/13)  PA(mean): 26 (19 Mar 2020 09:00) (26 - 26)  PCWP: --  SVR: --  SVRI: --  PVR: --  PVRI: --      PHYSICAL EXAM:  General: WN/WD NAD    HEENT:     [+] NCAT  [+] EOMI  [-] Conjuctival edema   [-] Icterus   [-] Thrush   [-] ETT  [-] NGT/OGT    Neck:         [+] FROM   [-] JVD     [-] Nodes     [-] Masses    [+] Mid-line trachea    [-] Tracheostomy    Chest:         [-] Sternal click   [-] Sternal drainage   [+] Pacing wires   [-] Chest tubes   [-] SubQ emphysema    Lungs:          [+] CTA   [-] Rhonchi   [-] Rales    [-] Wheezing    [-] Decreased BS    [-] Dullness R L    Cardiac:       [+] S1 [+] S2    [+] RRR   [-] Irregular   [-] S3   [-] S4    [-] Murmurs    [-] Rub    Abdomen:    [+] BS    [+] Soft    [+] Non-tender     [-] Distended    [-] Organomegaly  [-] PEG    Extremities:   [-] Cyanosis U/L   [-] Clubbing  U/L  [-] LE/UE Edema   [+] Capillary refill    [+] Pulses     Neuro:        [+] Awake   [+]  Alert   [-] Confused   [-] Lethargic   [-] Sedated   [-] Generalized Weakness    Skin:        [-] Rashes    [-] Erythema   [+] Normal incisions   [+] IV sites intact   [-] Sacral decubitus    Tubes:  LINES:    CAPILLARY BLOOD GLUCOSE  104 (20 Mar 2020 04:45)      POCT Blood Glucose.: 196 mg/dL (20 Mar 2020 08:38)    CAPILLARY BLOOD GLUCOSE  104 (20 Mar 2020 04:45)  126 (19 Mar 2020 18:00)  119 (19 Mar 2020 16:00)  158 (19 Mar 2020 14:00)  162 (19 Mar 2020 12:00)  170 (19 Mar 2020 10:00)      POCT Blood Glucose.: 196 mg/dL (20 Mar 2020 08:38)  POCT Blood Glucose.: 128 mg/dL (20 Mar 2020 06:22)  POCT Blood Glucose.: 92 mg/dL (20 Mar 2020 01:47)  POCT Blood Glucose.: 97 mg/dL (19 Mar 2020 21:58)  POCT Blood Glucose.: 121 mg/dL (19 Mar 2020 19:59)  POCT Blood Glucose.: 129 mg/dL (19 Mar 2020 18:02)  POCT Blood Glucose.: 119 mg/dL (19 Mar 2020 15:50)  POCT Blood Glucose.: 158 mg/dL (19 Mar 2020 13:45)  POCT Blood Glucose.: 162 mg/dL (19 Mar 2020 12:21)  POCT Blood Glucose.: 170 mg/dL (19 Mar 2020 10:11)      HOSPITAL MEDICATIONS:  MEDICATIONS  (STANDING):  albuterol/ipratropium for Nebulization 3 milliLiter(s) Nebulizer every 6 hours  aspirin 325 milliGRAM(s) Oral daily  atorvastatin 40 milliGRAM(s) Oral at bedtime  benzocaine 20% Spray 1 Spray(s) Topical every 4 hours  chlorhexidine 4% Liquid 1 Application(s) Topical <User Schedule>  dextrose 50% Injectable 50 milliLiter(s) IV Push every 15 minutes  dextrose 50% Injectable 25 milliLiter(s) IV Push every 15 minutes  famotidine    Tablet 20 milliGRAM(s) Oral two times a day  guaiFENesin  milliGRAM(s) Oral every 12 hours  heparin  Injectable 5000 Unit(s) SubCutaneous every 12 hours  influenza   Vaccine 0.5 milliLiter(s) IntraMuscular once  insulin regular Infusion 1 Unit(s)/Hr (1 mL/Hr) IV Continuous <Continuous>  magnesium sulfate  IVPB 1 Gram(s) IV Intermittent every 12 hours  norepinephrine Infusion 0.05 MICROgram(s)/kG/Min (7.74 mL/Hr) IV Continuous <Continuous>  polyethylene glycol 3350 17 Gram(s) Oral daily  sodium chloride 0.9%. 1000 milliLiter(s) (20 mL/Hr) IV Continuous <Continuous>  vasopressin Infusion 0.02 Unit(s)/Min (1.2 mL/Hr) IV Continuous <Continuous>    MEDICATIONS  (PRN):  glucagon  Injectable 1 milliGRAM(s) IntraMuscular once PRN Glucose LESS THAN 70 milligrams/deciliter  ondansetron  IVPB 4 milliGRAM(s) IV Intermittent once PRN Nausea and/or Vomiting  oxyCODONE    IR 10 milliGRAM(s) Oral every 4 hours PRN Severe Pain (7 - 10)  oxyCODONE    IR 5 milliGRAM(s) Oral every 4 hours PRN Moderate Pain (4 - 6)      LABS:  ABG - ( 20 Mar 2020 04:54 )  pH, Arterial: 7.37  pH, Blood: x     /  pCO2: 38    /  pO2: 75    / HCO3: 22    / Base Excess: -3.0  /  SaO2: 95                                      9.9    9.46  )-----------( 98       ( 20 Mar 2020 02:00 )             29.4     03-20    136  |  100  |  14  ----------------------------<  84  3.9   |  22  |  0.8    Ca    8.1<L>      20 Mar 2020 02:00  Mg     2.4     03-20    TPro  5.7<L>  /  Alb  4.7  /  TBili  4.2<H>  /  DBili  x   /  AST  40  /  ALT  27  /  AlkPhos  34  03-20    PT/INR - ( 20 Mar 2020 02:00 )   PT: 14.70 sec;   INR: 1.28 ratio         PTT - ( 20 Mar 2020 02:00 )  PTT:32.7 sec        RADIOLOGY:  Reviewed and interpreted by me  CXR from 03-20-20 shows [+] mild congestion, [-] pneumothorax, [-] R/L effusion, [-] cardiomegaly,       ECG:  Reviewed and interpreted by me:   QTC:    Assessment:  CAD SP CABG  headache  Anemia  HTN    PAST MEDICAL & SURGICAL HISTORY:  CAD (coronary artery disease): s/p PTCA WITH 2 STENTS  H/O hyperlipidemia  H/O: HTN (hypertension)  DM2 (diabetes mellitus, type 2)  History of tonsillectomy  H/O knee surgery      PLAN:  Neuro: Pain control  Pulm: Encourage coughing, deep breathing and use of incentive spirometry. Wean off supplemental oxygen as able. Daily CXR.   Cardio: Monitor telemetry/alarms. Continue cardiac meds  GI: Tolerating diet. Continue stool softeners. Continue GI prophylaxis  Renal: monitor urine output, supplement electrolytes as needed  Vasc: Heparin SC/SCDs for DVT prophylaxis  Heme: Monitor H/H.   ID: Off antibiotics. Stable.  Endocrine: Monitor finger stick blood sugar and control hyperglycemia with insulin  Physical Therapy: OOB/ambulate        Discussed with Cardiothoracic Team at AM rounds.

## 2020-03-21 LAB
ALBUMIN SERPL ELPH-MCNC: 4.6 G/DL — SIGNIFICANT CHANGE UP (ref 3.5–5.2)
ALP SERPL-CCNC: 41 U/L — SIGNIFICANT CHANGE UP (ref 30–115)
ALT FLD-CCNC: 30 U/L — SIGNIFICANT CHANGE UP (ref 0–41)
ANION GAP SERPL CALC-SCNC: 13 MMOL/L — SIGNIFICANT CHANGE UP (ref 7–14)
AST SERPL-CCNC: 40 U/L — SIGNIFICANT CHANGE UP (ref 0–41)
BASE EXCESS BLDA CALC-SCNC: -1 MMOL/L — SIGNIFICANT CHANGE UP (ref -2–2)
BASOPHILS # BLD AUTO: 0.02 K/UL — SIGNIFICANT CHANGE UP (ref 0–0.2)
BASOPHILS NFR BLD AUTO: 0.3 % — SIGNIFICANT CHANGE UP (ref 0–1)
BILIRUB SERPL-MCNC: 4.4 MG/DL — HIGH (ref 0.2–1.2)
BUN SERPL-MCNC: 10 MG/DL — SIGNIFICANT CHANGE UP (ref 10–20)
CALCIUM SERPL-MCNC: 8.2 MG/DL — LOW (ref 8.5–10.1)
CHLORIDE SERPL-SCNC: 105 MMOL/L — SIGNIFICANT CHANGE UP (ref 98–110)
CO2 SERPL-SCNC: 22 MMOL/L — SIGNIFICANT CHANGE UP (ref 17–32)
CREAT SERPL-MCNC: 0.8 MG/DL — SIGNIFICANT CHANGE UP (ref 0.7–1.5)
EOSINOPHIL # BLD AUTO: 0.01 K/UL — SIGNIFICANT CHANGE UP (ref 0–0.7)
EOSINOPHIL NFR BLD AUTO: 0.2 % — SIGNIFICANT CHANGE UP (ref 0–8)
GAS PNL BLDA: SIGNIFICANT CHANGE UP
GLUCOSE BLDC GLUCOMTR-MCNC: 100 MG/DL — HIGH (ref 70–99)
GLUCOSE BLDC GLUCOMTR-MCNC: 106 MG/DL — HIGH (ref 70–99)
GLUCOSE BLDC GLUCOMTR-MCNC: 109 MG/DL — HIGH (ref 70–99)
GLUCOSE BLDC GLUCOMTR-MCNC: 119 MG/DL — HIGH (ref 70–99)
GLUCOSE BLDC GLUCOMTR-MCNC: 127 MG/DL — HIGH (ref 70–99)
GLUCOSE BLDC GLUCOMTR-MCNC: 178 MG/DL — HIGH (ref 70–99)
GLUCOSE BLDC GLUCOMTR-MCNC: 94 MG/DL — SIGNIFICANT CHANGE UP (ref 70–99)
GLUCOSE SERPL-MCNC: 117 MG/DL — HIGH (ref 70–99)
HCO3 BLDA-SCNC: 23 MMOL/L — SIGNIFICANT CHANGE UP (ref 23–27)
HCT VFR BLD CALC: 25.5 % — LOW (ref 42–52)
HGB BLD-MCNC: 8.8 G/DL — LOW (ref 14–18)
HOROWITZ INDEX BLDA+IHG-RTO: 100 — SIGNIFICANT CHANGE UP
IMM GRANULOCYTES NFR BLD AUTO: 0.2 % — SIGNIFICANT CHANGE UP (ref 0.1–0.3)
LYMPHOCYTES # BLD AUTO: 0.71 K/UL — LOW (ref 1.2–3.4)
LYMPHOCYTES # BLD AUTO: 11.9 % — LOW (ref 20.5–51.1)
MAGNESIUM SERPL-MCNC: 2.4 MG/DL — SIGNIFICANT CHANGE UP (ref 1.8–2.4)
MCHC RBC-ENTMCNC: 31 PG — SIGNIFICANT CHANGE UP (ref 27–31)
MCHC RBC-ENTMCNC: 34.5 G/DL — SIGNIFICANT CHANGE UP (ref 32–37)
MCV RBC AUTO: 89.8 FL — SIGNIFICANT CHANGE UP (ref 80–94)
MONOCYTES # BLD AUTO: 0.63 K/UL — HIGH (ref 0.1–0.6)
MONOCYTES NFR BLD AUTO: 10.5 % — HIGH (ref 1.7–9.3)
NEUTROPHILS # BLD AUTO: 4.61 K/UL — SIGNIFICANT CHANGE UP (ref 1.4–6.5)
NEUTROPHILS NFR BLD AUTO: 76.9 % — HIGH (ref 42.2–75.2)
NRBC # BLD: 0 /100 WBCS — SIGNIFICANT CHANGE UP (ref 0–0)
PCO2 BLDA: 33 MMHG — LOW (ref 38–42)
PH BLDA: 7.45 — HIGH (ref 7.38–7.42)
PLATELET # BLD AUTO: 94 K/UL — LOW (ref 130–400)
PO2 BLDA: 385 MMHG — HIGH (ref 78–95)
POTASSIUM SERPL-MCNC: 3.7 MMOL/L — SIGNIFICANT CHANGE UP (ref 3.5–5)
POTASSIUM SERPL-SCNC: 3.7 MMOL/L — SIGNIFICANT CHANGE UP (ref 3.5–5)
PROT SERPL-MCNC: 5.8 G/DL — LOW (ref 6–8)
RBC # BLD: 2.84 M/UL — LOW (ref 4.7–6.1)
RBC # FLD: 13 % — SIGNIFICANT CHANGE UP (ref 11.5–14.5)
SAO2 % BLDA: 100 % — HIGH (ref 94–98)
SODIUM SERPL-SCNC: 140 MMOL/L — SIGNIFICANT CHANGE UP (ref 135–146)
WBC # BLD: 5.99 K/UL — SIGNIFICANT CHANGE UP (ref 4.8–10.8)
WBC # FLD AUTO: 5.99 K/UL — SIGNIFICANT CHANGE UP (ref 4.8–10.8)

## 2020-03-21 PROCEDURE — 99233 SBSQ HOSP IP/OBS HIGH 50: CPT

## 2020-03-21 PROCEDURE — 71045 X-RAY EXAM CHEST 1 VIEW: CPT | Mod: 26

## 2020-03-21 PROCEDURE — 93010 ELECTROCARDIOGRAM REPORT: CPT

## 2020-03-21 PROCEDURE — 99232 SBSQ HOSP IP/OBS MODERATE 35: CPT

## 2020-03-21 RX ORDER — SODIUM CHLORIDE 9 MG/ML
1000 INJECTION INTRAMUSCULAR; INTRAVENOUS; SUBCUTANEOUS
Refills: 0 | Status: DISCONTINUED | OUTPATIENT
Start: 2020-03-21 | End: 2020-03-23

## 2020-03-21 RX ORDER — KETOROLAC TROMETHAMINE 30 MG/ML
30 SYRINGE (ML) INJECTION ONCE
Refills: 0 | Status: DISCONTINUED | OUTPATIENT
Start: 2020-03-21 | End: 2020-03-21

## 2020-03-21 RX ORDER — INSULIN LISPRO 100/ML
VIAL (ML) SUBCUTANEOUS
Refills: 0 | Status: DISCONTINUED | OUTPATIENT
Start: 2020-03-21 | End: 2020-03-23

## 2020-03-21 RX ORDER — INSULIN GLARGINE 100 [IU]/ML
10 INJECTION, SOLUTION SUBCUTANEOUS ONCE
Refills: 0 | Status: COMPLETED | OUTPATIENT
Start: 2020-03-21 | End: 2020-03-21

## 2020-03-21 RX ORDER — INSULIN GLARGINE 100 [IU]/ML
30 INJECTION, SOLUTION SUBCUTANEOUS EVERY MORNING
Refills: 0 | Status: DISCONTINUED | OUTPATIENT
Start: 2020-03-22 | End: 2020-03-23

## 2020-03-21 RX ORDER — FUROSEMIDE 40 MG
20 TABLET ORAL ONCE
Refills: 0 | Status: COMPLETED | OUTPATIENT
Start: 2020-03-21 | End: 2020-03-21

## 2020-03-21 RX ORDER — ACETAMINOPHEN 500 MG
650 TABLET ORAL EVERY 6 HOURS
Refills: 0 | Status: DISCONTINUED | OUTPATIENT
Start: 2020-03-21 | End: 2020-03-23

## 2020-03-21 RX ORDER — LANOLIN ALCOHOL/MO/W.PET/CERES
3 CREAM (GRAM) TOPICAL AT BEDTIME
Refills: 0 | Status: DISCONTINUED | OUTPATIENT
Start: 2020-03-21 | End: 2020-03-23

## 2020-03-21 RX ORDER — ACETAMINOPHEN 500 MG
650 TABLET ORAL ONCE
Refills: 0 | Status: COMPLETED | OUTPATIENT
Start: 2020-03-21 | End: 2020-03-21

## 2020-03-21 RX ORDER — FUROSEMIDE 40 MG
40 TABLET ORAL ONCE
Refills: 0 | Status: COMPLETED | OUTPATIENT
Start: 2020-03-21 | End: 2020-03-21

## 2020-03-21 RX ORDER — POTASSIUM CHLORIDE 20 MEQ
20 PACKET (EA) ORAL
Refills: 0 | Status: COMPLETED | OUTPATIENT
Start: 2020-03-21 | End: 2020-03-21

## 2020-03-21 RX ORDER — INSULIN LISPRO 100/ML
10 VIAL (ML) SUBCUTANEOUS
Refills: 0 | Status: DISCONTINUED | OUTPATIENT
Start: 2020-03-21 | End: 2020-03-23

## 2020-03-21 RX ADMIN — Medication 325 MILLIGRAM(S): at 11:51

## 2020-03-21 RX ADMIN — Medication 650 MILLIGRAM(S): at 17:05

## 2020-03-21 RX ADMIN — Medication 3 MILLIGRAM(S): at 21:37

## 2020-03-21 RX ADMIN — Medication 25 MILLIGRAM(S): at 06:31

## 2020-03-21 RX ADMIN — ISOSORBIDE DINITRATE 5 MILLIGRAM(S): 5 TABLET ORAL at 21:36

## 2020-03-21 RX ADMIN — FAMOTIDINE 20 MILLIGRAM(S): 10 INJECTION INTRAVENOUS at 17:24

## 2020-03-21 RX ADMIN — INSULIN GLARGINE 10 UNIT(S): 100 INJECTION, SOLUTION SUBCUTANEOUS at 11:50

## 2020-03-21 RX ADMIN — Medication 600 MILLIGRAM(S): at 17:24

## 2020-03-21 RX ADMIN — Medication 650 MILLIGRAM(S): at 17:35

## 2020-03-21 RX ADMIN — Medication 10 UNIT(S): at 11:51

## 2020-03-21 RX ADMIN — OXYCODONE HYDROCHLORIDE 5 MILLIGRAM(S): 5 TABLET ORAL at 09:56

## 2020-03-21 RX ADMIN — Medication 50 MILLIEQUIVALENT(S): at 06:31

## 2020-03-21 RX ADMIN — INSULIN GLARGINE 20 UNIT(S): 100 INJECTION, SOLUTION SUBCUTANEOUS at 07:45

## 2020-03-21 RX ADMIN — Medication 20 MILLIGRAM(S): at 09:30

## 2020-03-21 RX ADMIN — Medication 50 MILLIEQUIVALENT(S): at 04:00

## 2020-03-21 RX ADMIN — Medication 25 MILLIGRAM(S): at 17:24

## 2020-03-21 RX ADMIN — HEPARIN SODIUM 5000 UNIT(S): 5000 INJECTION INTRAVENOUS; SUBCUTANEOUS at 17:24

## 2020-03-21 RX ADMIN — Medication 600 MILLIGRAM(S): at 06:29

## 2020-03-21 RX ADMIN — Medication 100 GRAM(S): at 17:24

## 2020-03-21 RX ADMIN — ISOSORBIDE DINITRATE 5 MILLIGRAM(S): 5 TABLET ORAL at 06:29

## 2020-03-21 RX ADMIN — HEPARIN SODIUM 5000 UNIT(S): 5000 INJECTION INTRAVENOUS; SUBCUTANEOUS at 06:29

## 2020-03-21 RX ADMIN — Medication 40 MILLIGRAM(S): at 17:55

## 2020-03-21 RX ADMIN — CLOPIDOGREL BISULFATE 75 MILLIGRAM(S): 75 TABLET, FILM COATED ORAL at 11:51

## 2020-03-21 RX ADMIN — Medication 30 MILLIGRAM(S): at 05:30

## 2020-03-21 RX ADMIN — CHLORHEXIDINE GLUCONATE 1 APPLICATION(S): 213 SOLUTION TOPICAL at 06:29

## 2020-03-21 RX ADMIN — Medication 3 MILLILITER(S): at 13:54

## 2020-03-21 RX ADMIN — FAMOTIDINE 20 MILLIGRAM(S): 10 INJECTION INTRAVENOUS at 06:29

## 2020-03-21 RX ADMIN — Medication 30 MILLIGRAM(S): at 06:00

## 2020-03-21 RX ADMIN — Medication 650 MILLIGRAM(S): at 08:35

## 2020-03-21 RX ADMIN — Medication 650 MILLIGRAM(S): at 08:05

## 2020-03-21 RX ADMIN — OXYCODONE HYDROCHLORIDE 5 MILLIGRAM(S): 5 TABLET ORAL at 10:26

## 2020-03-21 RX ADMIN — Medication 100 GRAM(S): at 06:29

## 2020-03-21 RX ADMIN — Medication 3 MILLILITER(S): at 20:48

## 2020-03-21 RX ADMIN — ISOSORBIDE DINITRATE 5 MILLIGRAM(S): 5 TABLET ORAL at 15:35

## 2020-03-21 RX ADMIN — ATORVASTATIN CALCIUM 40 MILLIGRAM(S): 80 TABLET, FILM COATED ORAL at 21:47

## 2020-03-21 RX ADMIN — Medication 2: at 11:51

## 2020-03-21 RX ADMIN — Medication 0.6 MILLIGRAM(S): at 06:29

## 2020-03-21 NOTE — PROGRESS NOTE ADULT - ASSESSMENT
59 years old right handed gentleman who recently underwent s/p CABG 2 days ago, developed HA, AMS and right homonymous hemianopsia and Stroke Code was called earlier today. Initial NIHSS 5 STAT CT head reports of no intracranial pathology. Consulted by Dr. Packer for possible neuroendovascular intervention, and when patient seen at bedside noted to have worsening confusion with repeat NIHSS 9. CTA head/neck reports  no LVO, with mild stenosis of left M1 and moderate stenosis of right distal P2 segment.  Current NIHSS is 3.      Plan:  Continue dual antiplatelets  Once stable obtain MRI brain NC to r/o stroke          Neuroattending note will follow 59 years old right handed gentleman who recently underwent s/p CABG 2 days ago, developed HA, AMS and right homonymous hemianopsia and Stroke Code was called earlier today. Initial NIHSS 5 STAT CT head reports of no intracranial pathology. Consulted by Dr. Packer for possible neuroendovascular intervention, and when patient seen at bedside noted to have worsening confusion with repeat NIHSS 9. CTA head/neck reports  no LVO, with mild stenosis of left M1 and moderate stenosis of right distal P2 segment.  Current NIHSS is 3.      Plan:  Continue dual antiplatelets  Once stable obtain MRI brain NC to r/o stroke

## 2020-03-21 NOTE — PROGRESS NOTE ADULT - SUBJECTIVE AND OBJECTIVE BOX
FATIMAH EASTMAN    Neuro: seen for f/u after stroke code. Patient is SOB, on non rebreather, a little  anxious, RHH persist with mild drift on the RUE. NIHSS is 3.    HPI:  59 yrs old with Hx of CAD, D , DL, HTN is here for elective LHC. Patient c/o progressive chest pain on exertion.  Pt had abnormal stress test. (16 Mar 2020 10:28)      Relevant PMH:  [] Prior ischemic stroke/TIA  [] Afib  []CAD  []HTN  []DLD  []DM []PVD []Obesity [] Sedintary lifestyle []CHF  []FERNY  []Cancer Hx     Social History: [] Smoking []  Drug Use: []   Alcohol Use:   [] Other:      Possible Location of Stroke:    Possible Cause of Stroke:    Relevant Cerebral Imaging:    Relevant Cervicocerebral Imaging:  CT Angio Neck w/ IV Cont:   EXAM:  CT ANGIO BRAIN (W)AW IC        EXAM:  CT ANGIO NECK (W)AW IC            PROCEDURE DATE:  03/20/2020            INTERPRETATION:  Clinical History / Reason for exam: Confusion. Stroke code.    Technique: CT angiogram of the head and neck. CTA of the head and neck was performed following the intravenous administration of 100 cc Optiray 320 (0 cc discarded) with coronal, sagittal and multiple 3-D MIP and volume rendered reformats.    Comparison: None    Findings:     NECK:  The visualized aortic arch and great vessel origins are patent.  The common, internal and external carotid arteries are patent.  The vertebral arteries are patent.    HEAD:  The distal internal carotid arteries are patent. The anterior and middle cerebral arteries arepatent. There is mild stenosis of the right MCA M1 segment.    The distal vertebral arteries are patent.  The basilar artery is patent. There is moderate stenosis of the distal P2 segment of the right posterior cerebral artery.    OTHER:  Mild cervical spine degenerative changes.  Right neck hemodialysis catheter.  Small bilateral pleural effusions with subjacent compressive atelectasis.    IMPRESSION:     1.  No evidence of acute large vessel occlusion.    2.  Mild stenosis of the right MCA proximal M1 segment. Moderate stenosis of the right PCA distal P2 segment.                  KOREY JOAQUIN M.D., ATTENDING RADIOLOGIST  This document has been electronically signed. Mar 20 2020 12:25PM             (03-20-20 @ 11:16)    CT Angio Head w/ IV Cont:   EXAM:  CT ANGIO BRAIN (W)AW IC        EXAM:  CT ANGIO NECK (W)AW IC            PROCEDURE DATE:  03/20/2020            INTERPRETATION:  Clinical History / Reason for exam: Confusion. Stroke code.    Technique: CT angiogram of the head and neck. CTA of the head and neck was performed following the intravenous administration of 100 cc Optiray 320 (0 cc discarded) with coronal, sagittal and multiple 3-D MIP and volume rendered reformats.    Comparison: None    Findings:     NECK:  The visualized aortic arch and great vessel origins are patent.  The common, internal and external carotid arteries are patent.  The vertebral arteries are patent.    HEAD:  The distal internal carotid arteries are patent. The anterior and middle cerebral arteries arepatent. There is mild stenosis of the right MCA M1 segment.    The distal vertebral arteries are patent.  The basilar artery is patent. There is moderate stenosis of the distal P2 segment of the right posterior cerebral artery.    OTHER:  Mild cervical spine degenerative changes.  Right neck hemodialysis catheter.  Small bilateral pleural effusions with subjacent compressive atelectasis.    IMPRESSION:     1.  No evidence of acute large vessel occlusion.    2.  Mild stenosis of the right MCA proximal M1 segment. Moderate stenosis of the right PCA distal P2 segment.                  KOREY JOAQUIN M.D., ATTENDING RADIOLOGIST  This document has been electronically signed. Mar 20 2020 12:25PM             (03-20-20 @ 11:16)        Relevant blood tests:  Direct LDL: 85 mg/dL [4 - 129] (03-16-20 @ 14:55)  Hemoglobin A1C, Whole Blood: 9.1 % (03-16-20 @ 14:55)      Relevant cardiac rhythm monitoring:    Relevant Cardiac Structure:(TTE/MARANDA +/-):[]No intracardiac thrombus/[] no vegetation/[]no [place]/EF:      Home Medications:  aspirin 81 mg oral tablet: 1 tab(s) orally once a day (16 Mar 2020 10:33)  Lipitor 40 mg oral tablet: 1 tab(s) orally once a day (16 Mar 2020 10:33)  metFORMIN 500 mg oral tablet: 1 tab(s) orally 2 times a day (16 Mar 2020 10:33)  metoprolol succinate 25 mg oral tablet, extended release: 1 tab(s) orally once a day (16 Mar 2020 10:33)  Plavix 75 mg oral tablet: 1 tab(s) orally once a day (16 Mar 2020 10:33)      MEDICATIONS  (STANDING):  albumin human  5% IVPB 500 milliLiter(s) IV Intermittent once  albuterol/ipratropium for Nebulization 3 milliLiter(s) Nebulizer every 6 hours  aspirin 325 milliGRAM(s) Oral daily  atorvastatin 40 milliGRAM(s) Oral at bedtime  benzocaine 20% Spray 1 Spray(s) Topical every 4 hours  chlorhexidine 4% Liquid 1 Application(s) Topical <User Schedule>  clopidogrel Tablet 75 milliGRAM(s) Oral daily  colchicine 0.6 milliGRAM(s) Oral every 12 hours  dextrose 50% Injectable 50 milliLiter(s) IV Push every 15 minutes  dextrose 50% Injectable 25 milliLiter(s) IV Push every 15 minutes  famotidine    Tablet 20 milliGRAM(s) Oral two times a day  guaiFENesin  milliGRAM(s) Oral every 12 hours  heparin  Injectable 5000 Unit(s) SubCutaneous every 12 hours  influenza   Vaccine 0.5 milliLiter(s) IntraMuscular once  insulin glargine Injectable (LANTUS) 20 Unit(s) SubCutaneous every morning  insulin regular Infusion 1 Unit(s)/Hr (1 mL/Hr) IV Continuous <Continuous>  isosorbide   dinitrate Tablet (ISORDIL) 5 milliGRAM(s) Oral three times a day  magnesium sulfate  IVPB 1 Gram(s) IV Intermittent every 12 hours  metoprolol tartrate 25 milliGRAM(s) Oral two times a day  niCARdipine Infusion 5 mG/Hr (25 mL/Hr) IV Continuous <Continuous>  polyethylene glycol 3350 17 Gram(s) Oral daily  sodium chloride 0.9%. 1000 milliLiter(s) (100 mL/Hr) IV Continuous <Continuous>      PT/OT/Speech/Rehab/S&Swr:    Exam:    Vital Signs Last 24 Hrs  T(C): 38.5 (21 Mar 2020 04:24), Max: 39.4 (20 Mar 2020 21:30)  T(F): 101.3 (21 Mar 2020 04:24), Max: 103 (20 Mar 2020 21:30)  HR: 98 (21 Mar 2020 07:00) (89 - 119)  BP: 164/87 (20 Mar 2020 20:30) (117/73 - 164/87)  BP(mean): 118 (20 Mar 2020 20:30) (88 - 118)  RR: 43 (21 Mar 2020 07:00) (18 - 47)  SpO2: 100% (21 Mar 2020 07:00) (91% - 100%)    NIHSS      LOC:       1a:  0   1b(Questions):  0         1c(Instructions):  0           Best Gaze:0  Visual: 2 (right)  Motor:                 RUE: 1    RLE: 0    LUE: 0    LLE: 0    FACE:  0   Limb Ataxia:0  Sensory:     0  Language:  0     Dysarthria:  0        Extinction and Inattention: 0    NIHSS on admission:          NIHSS yesterday:          NIHSS today:   3          m-RS: 3    Impression:      Suggestion:  Routine stroke workup including:    Disposition:

## 2020-03-21 NOTE — PROGRESS NOTE ADULT - SUBJECTIVE AND OBJECTIVE BOX
CTU Attending Progress Daily Note     21 Mar 2020 11:19  Admited 03-16-20, Hospital Day 5d  POD# - 3    HPI:  59 yrs old with Hx of CAD, D , DL, HTN is here for elective LHC. Patient c/o progressive chest pain on exertion.  Pt had abnormal stress test. (16 Mar 2020 10:28)    Home Medications:  aspirin 81 mg oral tablet: 1 tab(s) orally once a day (16 Mar 2020 10:33)  Lipitor 40 mg oral tablet: 1 tab(s) orally once a day (16 Mar 2020 10:33)  metFORMIN 500 mg oral tablet: 1 tab(s) orally 2 times a day (16 Mar 2020 10:33)  metoprolol succinate 25 mg oral tablet, extended release: 1 tab(s) orally once a day (16 Mar 2020 10:33)  Plavix 75 mg oral tablet: 1 tab(s) orally once a day (16 Mar 2020 10:33)    FAMILY HISTORY:    PAST MEDICAL & SURGICAL HISTORY:  CAD (coronary artery disease): s/p PTCA WITH 2 STENTS  H/O hyperlipidemia  H/O: HTN (hypertension)  DM2 (diabetes mellitus, type 2)  History of tonsillectomy  H/O knee surgery    Interval event for past 24 hr:  FATIMAH EASTMAN  59y had no event.   Current Complains:  FATIMAH EASTMAN has no new complains  Allergies    No Known Allergies    Intolerances      OBJECTIVE:  Vitals last 24 hrs  T(C): 38.5 (03-21-20 @ 04:24), Max: 39.4 (03-20-20 @ 21:30)  T(F): 101.3 (03-21-20 @ 04:24), Max: 103 (03-20-20 @ 21:30)  HR: 86 (03-21-20 @ 10:00) (83 - 119)  BP: 137/84 (03-21-20 @ 10:00) (117/73 - 164/87)  ABP: 136/71 (03-21-20 @ 10:00) (107/62 - 170/78)  ABP(mean): 95 (03-21-20 @ 10:00) (76 - 114)  RR: 20 (03-21-20 @ 10:00) (18 - 47)  SpO2: 100% (03-21-20 @ 10:00) (91% - 100%)      03-20-20 @ 07:01  -  03-21-20 @ 07:00  --------------------------------------------------------  IN:    Albumin 5%  - 500 mL: 2000 mL    insulin regular Infusion: 63 mL    IV PiggyBack: 900 mL    niCARdipine Infusion: 90 mL    nitroglycerin  Infusion: 110 mL    Oral Fluid: 240 mL    sodium chloride 0.9%: 2000 mL    sodium chloride 0.9%: 40 mL  Total IN: 5443 mL    OUT:    Indwelling Catheter - Urethral: 5874 mL  Total OUT: 5874 mL    Total NET: -431 mL          CAPILLARY BLOOD GLUCOSE  170 (20 Mar 2020 12:00)      POCT Blood Glucose.: 178 mg/dL (21 Mar 2020 11:16)  POCT Blood Glucose.: 119 mg/dL (21 Mar 2020 07:04)  POCT Blood Glucose.: 94 mg/dL (21 Mar 2020 05:06)  POCT Blood Glucose.: 106 mg/dL (21 Mar 2020 03:06)  POCT Blood Glucose.: 127 mg/dL (21 Mar 2020 00:24)  POCT Blood Glucose.: 130 mg/dL (20 Mar 2020 22:13)  POCT Blood Glucose.: 76 mg/dL (20 Mar 2020 20:45)  POCT Blood Glucose.: 111 mg/dL (20 Mar 2020 18:06)  POCT Blood Glucose.: 105 mg/dL (20 Mar 2020 16:07)  POCT Blood Glucose.: 110 mg/dL (20 Mar 2020 14:18)  POCT Blood Glucose.: 170 mg/dL (20 Mar 2020 12:12)    LABS:  ABG - ( 21 Mar 2020 09:43 )  pH, Arterial: 7.45  pH, Blood: x     /  pCO2: 33    /  pO2: 385   / HCO3: 23    / Base Excess: -1.0  /  SaO2: 100             Blood Gas Arterial, Lactate: 1.1 mmoL/L (03-21-20 @ 09:43)  Blood Gas Arterial, Lactate: 1.0 mmoL/L (03-21-20 @ 04:28)  Blood Gas Arterial, Lactate: 1.4 mmoL/L (03-20-20 @ 13:31)  Blood Gas Arterial, Lactate: 2.4 mmoL/L <H> (03-20-20 @ 12:22)                          8.8    5.99  )-----------( 94       ( 21 Mar 2020 01:10 )             25.5     Hemoglobin: 8.8 g/dL (03-21 @ 01:10)  Hemoglobin: 9.9 g/dL (03-20 @ 02:00)  Hemoglobin: 9.3 g/dL (03-19 @ 02:00)  Hemoglobin: 10.5 g/dL (03-18 @ 13:25)    03-21    140  |  105  |  10  ----------------------------<  117<H>  3.7   |  22  |  0.8    Ca    8.2<L>      21 Mar 2020 01:10  Mg     2.4     03-21    TPro  5.8<L>  /  Alb  4.6  /  TBili  4.4<H>  /  DBili  x   /  AST  40  /  ALT  30  /  AlkPhos  41  03-21    Creatinine, Serum: 0.8 mg/dL (03-21 @ 01:10)  Creatinine, Serum: 0.8 mg/dL (03-20 @ 12:25)  Creatinine, Serum: 0.7 mg/dL (03-20 @ 09:16)  Creatinine, Serum: 0.8 mg/dL (03-20 @ 02:00)  Creatinine, Serum: 0.9 mg/dL (03-19 @ 02:00)  Creatinine, Serum: 0.9 mg/dL (03-18 @ 13:25)    PT/INR - ( 20 Mar 2020 02:00 )   PT: 14.70 sec;   INR: 1.28 ratio     PTT - ( 20 Mar 2020 02:00 )  PTT:32.7 sec      HOSPITAL MEDICATIONS:  MEDICATIONS  (STANDING):  albumin human  5% IVPB 500 milliLiter(s) IV Intermittent once  albuterol/ipratropium for Nebulization 3 milliLiter(s) Nebulizer every 6 hours  aspirin 325 milliGRAM(s) Oral daily  atorvastatin 40 milliGRAM(s) Oral at bedtime  benzocaine 20% Spray 1 Spray(s) Topical every 4 hours  chlorhexidine 4% Liquid 1 Application(s) Topical <User Schedule>  clopidogrel Tablet 75 milliGRAM(s) Oral daily  colchicine 0.6 milliGRAM(s) Oral every 12 hours  dextrose 50% Injectable 50 milliLiter(s) IV Push every 15 minutes  dextrose 50% Injectable 25 milliLiter(s) IV Push every 15 minutes  famotidine    Tablet 20 milliGRAM(s) Oral two times a day  furosemide   Injectable 20 milliGRAM(s) IV Push once  guaiFENesin  milliGRAM(s) Oral every 12 hours  heparin  Injectable 5000 Unit(s) SubCutaneous every 12 hours  influenza   Vaccine 0.5 milliLiter(s) IntraMuscular once  insulin glargine Injectable (LANTUS) 10 Unit(s) SubCutaneous once  insulin lispro (HumaLOG) corrective regimen sliding scale   SubCutaneous three times a day before meals  insulin lispro Injectable (HumaLOG) 10 Unit(s) SubCutaneous three times a day before meals  isosorbide   dinitrate Tablet (ISORDIL) 5 milliGRAM(s) Oral three times a day  magnesium sulfate  IVPB 1 Gram(s) IV Intermittent every 12 hours  metoprolol tartrate 25 milliGRAM(s) Oral two times a day  niCARdipine Infusion 5 mG/Hr (25 mL/Hr) IV Continuous <Continuous>  polyethylene glycol 3350 17 Gram(s) Oral daily  sodium chloride 0.9%. 1000 milliLiter(s) (10 mL/Hr) IV Continuous <Continuous>    MEDICATIONS  (PRN):  acetaminophen   Tablet .. 650 milliGRAM(s) Oral every 6 hours PRN Temp greater or equal to 38C (100.4F), Moderate Pain (4 - 6)  glucagon  Injectable 1 milliGRAM(s) IntraMuscular once PRN Glucose LESS THAN 70 milligrams/deciliter  ondansetron  IVPB 4 milliGRAM(s) IV Intermittent once PRN Nausea and/or Vomiting  oxyCODONE    IR 10 milliGRAM(s) Oral every 4 hours PRN Severe Pain (7 - 10)  oxyCODONE    IR 5 milliGRAM(s) Oral every 4 hours PRN Moderate Pain (4 - 6)      REVIEW OF SYSTEMS:  CONSTITUTIONAL: [X] all negative; [ ] weakness, [ ] fevers, [ ] chills  EYES/ENT: [X] all negative; [ ] visual changes, [ ] vertigo, [ ] throat pain, [ ] eye pain  NECK: [X] all negative; [ ] pain, [ ] stiffness  RESPIRATORY: [ ] all negative, [x ] cough, [ ] wheezing, [ ] hemoptysis, [x ] shortness of breath, [x] chest pain  CARDIOVASCULAR: [x ] all negative; [ ] anginal chest pain, [ ] palpitations, [ ] orthopnea  GASTROINTESTINAL: [X] all negative; [ ]abdominal pain, [ ] nausea, [ ] vomiting, [ ] hematemesis, [ ] diarrhea, [ ] constipation, [ ] melena, [ ] hematochezia.  GENITOURINARY: [X] all negative; [ ] dysuria, [ ] frequency, [ ] hematuria  NEUROLOGICAL: [X] all negative; [ ] numbness, [ ] weakness, [ ] paresthesias  MUSCULOSKELETAL: [X] all negative, [ ] joint pain, [ ] joint swelling, [ ] joint redness, [ ] bone pain  SKIN: [X] all negative; [ ] itching, [ ] burning, [ ] rashes, [ ] lesions   All other review of systems is negative unless indicated above.    [  ] Unable to assess ROS because     PHYSICAL EXAM:          CONSTITUTIONAL: Well-developed; well-nourished; in no acute distress.   	SKIN: warm, dry, no rashes or lesions  	HEENT: Atraumatic. Normocephalic. PERRL. Moist membranes, no conjunctival injection, sclera clear  	NECK: Supple; non tender.  No JVD. No lymphadenopathy.  	CARD: Normal S1, S2. Rate and Rhythm are regular. No murmurs.  	RESP: Good air entry bilaterally, no wheezes, ++ rales + rhonchi.  	ABD: Soft, not tender, not distended, no CVA tendernass, no rebound no guarding, bowel sounds present  	EXT: Normal ROM.  No clubbing, no cyanosis, + pedal edema, no calf pain b/l, Peripheral pulses intact.  	LYMPH: No acute cervical adenopathy.  	NEURO: Alert, awake, motor 5/5 R, 5/5 L, sensation intact bilat, CN 2-12 intact,          PSYCH: Cooperative, appropriate. Alert & oriented x 3    RADIOLOGY:  X Reviewed and interpreted by me  CxR from 03-21-20 shows moderate congestion, no pneumothorax, no effusion, no cardiomegaly,       ECG:  X Reviewed and interpreted by me - NSR - 87, QTc - 447

## 2020-03-21 NOTE — PROGRESS NOTE ADULT - ASSESSMENT
PROBLEMS:  I spent 45 minutes of time examining patient, reviewing vitals, labs, medications, imaging and discussing with the team goals of care to prevent life-threatening in this patient who is at high risk for deterioration or death due to:    1.	CAD - s/p CABG - lopressor 25 q 12, , clopidogrel 75, issosorbide  2.	fluid overload - stop IV fluid - lasix 20 IV x 1  3.	Transient hemianopsia - CT and perfusion negative - clopidrogel 75  4.	DM - change lantus to 30 units and Lispro to 10 with meals    PLAN  Neuro: move all 4 extremities. no sensory or motor deficits  Pain management.   acetaminophen   Tablet .. 650 milliGRAM(s) Oral every 6 hours PRN  aspirin 325 milliGRAM(s) Oral daily    Pulm: Wean off supplemental oxygen as able. Daily CXR. Encourage coughing, deep breathing and use of incentive spirometry.   albuterol/ipratropium for Nebulization 3 milliLiter(s) Nebulizer every 6 hours  guaiFENesin  milliGRAM(s) Oral every 12 hours    Cardio: Monitor telemetry/alarms. Continue supportive care   furosemide   Injectable 20 milliGRAM(s) IV Push once  isosorbide   dinitrate Tablet (ISORDIL) 5 milliGRAM(s) Oral three times a day  metoprolol tartrate 25 milliGRAM(s) Oral two times a day  niCARdipine Infusion 5 mG/Hr IV Continuous <Continuous>    GI: Continue stool softeners.    famotidine    Tablet 20 milliGRAM(s) Oral two times a day    Nutrition: Continue present diet  Endocrine and glucose control:   atorvastatin 40 milliGRAM(s) Oral at bedtime  colchicine 0.6 milliGRAM(s) Oral every 12 hours  glucagon  Injectable 1 milliGRAM(s) IntraMuscular once PRN  insulin glargine Injectable (LANTUS) 10 Unit(s) SubCutaneous once  insulin lispro (HumaLOG) corrective regimen sliding scale   SubCutaneous three times a day before meals  insulin lispro Injectable (HumaLOG) 10 Unit(s) SubCutaneous three times a day before meals    Renal: monitor urine output, supplement electrolytes as needed,     Vasc: Heparin SC and/or SCDs for DVT prophylaxis  clopidogrel Tablet 75 milliGRAM(s) Oral daily  heparin  Injectable 5000 Unit(s) SubCutaneous every 12 hours    ID: Stable, no fever , no chills. Off antibiotics.    Therapy: OOB/ambulate  Disposition: start planing discharge home or placement    Pertinent clinical, laboratory, radiographic, hemodynamic, echocardiographic, respiratory data, microbiologic data and chart were reviewed and analyzed frequently throughout the course of the day and night. GI and DVT prophylaxis, glycemic control, head of bed elevation and skin care issues were addressed.  Patient seen, examined and plan discussed with CT Surgery / CTICU team during rounds.    [ ] The patient remains in critical and unstable condition, and requires ICU care and monitoring  [x ] The patient is improving but requires continued monitoring and adjustment of therapy

## 2020-03-21 NOTE — PROGRESS NOTE ADULT - SUBJECTIVE AND OBJECTIVE BOX
OPERATIVE PROCEDURE(s):                POD #  3 s/p CABg with radial                     59yMale  SURGEON(s): PRECIOUS Yadav  SUBJECTIVE ASSESSMENT: doing little better today  Vital Signs Last 24 Hrs  T(F): 101.3 (21 Mar 2020 04:24), Max: 103 (20 Mar 2020 21:30)  HR: 98 (21 Mar 2020 07:00) (89 - 119)  BP: 164/87 (20 Mar 2020 20:30) (117/73 - 164/87)  BP(mean): 118 (20 Mar 2020 20:30) (88 - 118)  ABP: 152/78 (21 Mar 2020 07:00) (93/44 - 171/80)  ABP(mean): 106 (21 Mar 2020 07:00)  RR: 43 (21 Mar 2020 07:00) (18 - 47)  SpO2: 100% (21 Mar 2020 07:00) (91% - 100%)  CVP(mm Hg): --  CVP(cm H2O): --  CO: --  CI: --  PA: --  SVR: --    I&O's Detail    20 Mar 2020 07:01  -  21 Mar 2020 07:00  --------------------------------------------------------  IN:    Albumin 5%  - 500 mL: 2000 mL    insulin regular Infusion: 63 mL    IV PiggyBack: 900 mL    niCARdipine Infusion: 90 mL    nitroglycerin  Infusion: 110 mL    Oral Fluid: 240 mL    sodium chloride 0.9%: 40 mL    sodium chloride 0.9%.: 2000 mL  Total IN: 5443 mL    OUT:    Indwelling Catheter - Urethral: 5874 mL  Total OUT: 5874 mL    Net:   I&O's Detail    19 Mar 2020 07:01  -  20 Mar 2020 07:00  --------------------------------------------------------  Total NET: 331 mL    20 Mar 2020 07:01  -  21 Mar 2020 07:00  --------------------------------------------------------  Total NET: -431 mL    CAPILLARY BLOOD GLUCOSE  170 (20 Mar 2020 12:00)  201 (20 Mar 2020 11:00)  196 (20 Mar 2020 08:30)  104 (20 Mar 2020 04:45)    POCT Blood Glucose.: 119 mg/dL (21 Mar 2020 07:04)  POCT Blood Glucose.: 94 mg/dL (21 Mar 2020 05:06)  POCT Blood Glucose.: 106 mg/dL (21 Mar 2020 03:06)  POCT Blood Glucose.: 127 mg/dL (21 Mar 2020 00:24)  POCT Blood Glucose.: 130 mg/dL (20 Mar 2020 22:13)  POCT Blood Glucose.: 76 mg/dL (20 Mar 2020 20:45)  POCT Blood Glucose.: 111 mg/dL (20 Mar 2020 18:06)  POCT Blood Glucose.: 105 mg/dL (20 Mar 2020 16:07)  POCT Blood Glucose.: 110 mg/dL (20 Mar 2020 14:18)  POCT Blood Glucose.: 170 mg/dL (20 Mar 2020 12:12)  POCT Blood Glucose.: 201 mg/dL (20 Mar 2020 11:12)  POCT Blood Glucose.: 196 mg/dL (20 Mar 2020 08:38)    Physical Exam:  General: NAD; A&Ox3  Cardiac: S1/S2, RRR, no murmur, no rubs  Lungs: unlabored respirations, CTA b/l, no wheeze, no rales, no crackles  Abdomen: Soft/NT/ND; positive bowel sounds x 4  Sternum: Intact, no click, incision healing well with no drainage  Incisions: Incisions clean/dry/intact  Extremities: No edema b/l lower extremities; good capillary refill; no cyanosis; palpable 1+ pedal pulses b/l    Central Venous Catheter: Yes[]  unstable patient over last 24 hours  Webber Catheter: Yes  [] , strict I & O  EPICARDIAL WIRES:  [] YES                                               Day # 3  BOWEL MOVEMENT:   [] NO, If No, Timing since last BM:      Day # 3      LABS:                        8.8<L>  5.99  )-----------( 94<L>    ( 21 Mar 2020 01:10 )             25.5<L>                        9.9<L>  9.46  )-----------( 98<L>    ( 20 Mar 2020 02:00 )             29.4<L>    03-21    140  |  105  |  10  ----------------------------<  117<H>  3.7   |  22  |  0.8  03-20    139  |  103  |  10  ----------------------------<  149<H>  3.7   |  23  |  0.8    Ca    8.2<L>      21 Mar 2020 01:10  Mg     2.4     03-21    TPro  5.8<L> [6.0 - 8.0]  /  Alb  4.6 [3.5 - 5.2]  /  TBili  4.4<H> [0.2 - 1.2]  /  DBili  x   /  AST  40 [0 - 41]  /  ALT  30 [0 - 41]  /  AlkPhos  41 [30 - 115]  03-21    PT/INR - ( 20 Mar 2020 02:00 )   PT: ;   INR: 1.28 ratio       PTT - ( 20 Mar 2020 02:00 )  PTT:32.7 sec    ABG - ( 21 Mar 2020 04:28 )  pH: 7.48  /  pCO2: 31    /  pO2: 54    / HCO3: 23    / Base Excess: 0.2   /  SaO2: 92    /  LA: 1.0        RADIOLOGY & ADDITIONAL TESTS:  CXR:     EKG:     MEDICATIONS  (STANDING):  albumin human  5% IVPB 500 milliLiter(s) IV Intermittent once  albuterol/ipratropium for Nebulization 3 milliLiter(s) Nebulizer every 6 hours  aspirin 325 milliGRAM(s) Oral daily  atorvastatin 40 milliGRAM(s) Oral at bedtime  benzocaine 20% Spray 1 Spray(s) Topical every 4 hours  chlorhexidine 4% Liquid 1 Application(s) Topical <User Schedule>  clopidogrel Tablet 75 milliGRAM(s) Oral daily  colchicine 0.6 milliGRAM(s) Oral every 12 hours  dextrose 50% Injectable 50 milliLiter(s) IV Push every 15 minutes  dextrose 50% Injectable 25 milliLiter(s) IV Push every 15 minutes  famotidine    Tablet 20 milliGRAM(s) Oral two times a day  guaiFENesin  milliGRAM(s) Oral every 12 hours  heparin  Injectable 5000 Unit(s) SubCutaneous every 12 hours  influenza   Vaccine 0.5 milliLiter(s) IntraMuscular once  insulin glargine Injectable (LANTUS) 20 Unit(s) SubCutaneous every morning  insulin regular Infusion 1 Unit(s)/Hr (1 mL/Hr) IV Continuous <Continuous>  isosorbide   dinitrate Tablet (ISORDIL) 5 milliGRAM(s) Oral three times a day  magnesium sulfate  IVPB 1 Gram(s) IV Intermittent every 12 hours  metoprolol tartrate 25 milliGRAM(s) Oral two times a day  niCARdipine Infusion 5 mG/Hr (25 mL/Hr) IV Continuous <Continuous>  polyethylene glycol 3350 17 Gram(s) Oral daily  sodium chloride 0.9%. 1000 milliLiter(s) (100 mL/Hr) IV Continuous <Continuous>    MEDICATIONS  (PRN):  glucagon  Injectable 1 milliGRAM(s) IntraMuscular once PRN Glucose LESS THAN 70 milligrams/deciliter  ondansetron  IVPB 4 milliGRAM(s) IV Intermittent once PRN Nausea and/or Vomiting  oxyCODONE    IR 10 milliGRAM(s) Oral every 4 hours PRN Severe Pain (7 - 10)  oxyCODONE    IR 5 milliGRAM(s) Oral every 4 hours PRN Moderate Pain (4 - 6)    Allergies    No Known Allergies    Intolerances      Ambulation/Activity Status:    Assessment/Plan:  59y Male status-post CABG with  CVA ruled out by CT and CTA of head and neck.  - Case and plan discussed with CTU Intensivist and CT Surgeon - Dr. Yadav/Keyur/Gorge   - Continue CTU supportive care    - Continue DVT/GI prophylaxis  - Incentive Spirometry 10 times an hour  - Continue to advance physical activity as tolerated and continue PT/OT as directed  - CAD: Continue ASA, statin, Plavix  - increased metoprolol to 25 mg BID  - continue Isordil 5 mg q8h  - discontinue nitro drip for hypertension control  - increase Lantus 30, start prandial insulin 10 units with meals and stop insulin drip  - maintain diet  - repeat LFt's in AM 2/2 rising bilirubin/ alk phos  - will continue webber for I and O OPERATIVE PROCEDURE(s):                POD #  3 s/p CABg with radial                     59yMale  SURGEON(s): PRECIOUS Yadav  SUBJECTIVE ASSESSMENT: doing little better today  Vital Signs Last 24 Hrs  T(F): 101.3 (21 Mar 2020 04:24), Max: 103 (20 Mar 2020 21:30)  HR: 98 (21 Mar 2020 07:00) (89 - 119)  BP: 164/87 (20 Mar 2020 20:30) (117/73 - 164/87)  BP(mean): 118 (20 Mar 2020 20:30) (88 - 118)  ABP: 152/78 (21 Mar 2020 07:00) (93/44 - 171/80)  ABP(mean): 106 (21 Mar 2020 07:00)  RR: 43 (21 Mar 2020 07:00) (18 - 47)  SpO2: 100% (21 Mar 2020 07:00) (91% - 100%)    I&O's Detail    20 Mar 2020 07:01  -  21 Mar 2020 07:00  --------------------------------------------------------  IN:    Albumin 5%  - 500 mL: 2000 mL    insulin regular Infusion: 63 mL    IV PiggyBack: 900 mL    niCARdipine Infusion: 90 mL    nitroglycerin  Infusion: 110 mL    Oral Fluid: 240 mL    sodium chloride 0.9%: 40 mL    sodium chloride 0.9%.: 2000 mL  Total IN: 5443 mL    OUT:    Indwelling Catheter - Urethral: 5874 mL  Total OUT: 5874 mL    Net:   I&O's Detail    19 Mar 2020 07:01  -  20 Mar 2020 07:00  --------------------------------------------------------  Total NET: 331 mL    20 Mar 2020 07:01  -  21 Mar 2020 07:00  --------------------------------------------------------  Total NET: -431 mL    CAPILLARY BLOOD GLUCOSE  170 (20 Mar 2020 12:00)  201 (20 Mar 2020 11:00)  196 (20 Mar 2020 08:30)  104 (20 Mar 2020 04:45)    POCT Blood Glucose.: 119 mg/dL (21 Mar 2020 07:04)  POCT Blood Glucose.: 94 mg/dL (21 Mar 2020 05:06)  POCT Blood Glucose.: 106 mg/dL (21 Mar 2020 03:06)  POCT Blood Glucose.: 127 mg/dL (21 Mar 2020 00:24)  POCT Blood Glucose.: 130 mg/dL (20 Mar 2020 22:13)  POCT Blood Glucose.: 76 mg/dL (20 Mar 2020 20:45)  POCT Blood Glucose.: 111 mg/dL (20 Mar 2020 18:06)  POCT Blood Glucose.: 105 mg/dL (20 Mar 2020 16:07)  POCT Blood Glucose.: 110 mg/dL (20 Mar 2020 14:18)  POCT Blood Glucose.: 170 mg/dL (20 Mar 2020 12:12)  POCT Blood Glucose.: 201 mg/dL (20 Mar 2020 11:12)  POCT Blood Glucose.: 196 mg/dL (20 Mar 2020 08:38)    Physical Exam:  General: NAD; A&Ox3  Cardiac: S1/S2, RRR, no murmur, no rubs  Lungs: unlabored respirations, CTA b/l, no wheeze, no rales, no crackles  Abdomen: Soft/NT/ND; positive bowel sounds x 4  Sternum: Intact, no click, incision healing well with no drainage  Incisions: Incisions clean/dry/intact  Extremities: No edema b/l lower extremities; good capillary refill; no cyanosis; palpable 1+ pedal pulses b/l    Central Venous Catheter: Yes[]  unstable patient over last 24 hours  Webber Catheter: Yes  [] , strict I & O  EPICARDIAL WIRES:  [] YES                                               Day # 3  BOWEL MOVEMENT:   [] NO, If No, Timing since last BM:      Day # 3      LABS:                        8.8<L>  5.99  )-----------( 94<L>    ( 21 Mar 2020 01:10 )             25.5<L>                        9.9<L>  9.46  )-----------( 98<L>    ( 20 Mar 2020 02:00 )             29.4<L>    03-21    140  |  105  |  10  ----------------------------<  117<H>  3.7   |  22  |  0.8  03-20    139  |  103  |  10  ----------------------------<  149<H>  3.7   |  23  |  0.8    Ca    8.2<L>      21 Mar 2020 01:10  Mg     2.4     03-21    TPro  5.8<L> [6.0 - 8.0]  /  Alb  4.6 [3.5 - 5.2]  /  TBili  4.4<H> [0.2 - 1.2]  /  DBili  x   /  AST  40 [0 - 41]  /  ALT  30 [0 - 41]  /  AlkPhos  41 [30 - 115]  03-21    PT/INR - ( 20 Mar 2020 02:00 )   PT: ;   INR: 1.28 ratio       PTT - ( 20 Mar 2020 02:00 )  PTT:32.7 sec    ABG - ( 21 Mar 2020 04:28 )  pH: 7.48  /  pCO2: 31    /  pO2: 54    / HCO3: 23    / Base Excess: 0.2   /  SaO2: 92    /  LA: 1.0      MEDICATIONS  (STANDING):  albumin human  5% IVPB 500 milliLiter(s) IV Intermittent once  albuterol/ipratropium for Nebulization 3 milliLiter(s) Nebulizer every 6 hours  aspirin 325 milliGRAM(s) Oral daily  atorvastatin 40 milliGRAM(s) Oral at bedtime  benzocaine 20% Spray 1 Spray(s) Topical every 4 hours  chlorhexidine 4% Liquid 1 Application(s) Topical <User Schedule>  clopidogrel Tablet 75 milliGRAM(s) Oral daily  colchicine 0.6 milliGRAM(s) Oral every 12 hours  famotidine    Tablet 20 milliGRAM(s) Oral two times a day  guaiFENesin  milliGRAM(s) Oral every 12 hours  heparin  Injectable 5000 Unit(s) SubCutaneous every 12 hours  influenza   Vaccine 0.5 milliLiter(s) IntraMuscular once  insulin glargine Injectable (LANTUS) 20 Unit(s) SubCutaneous every morning  insulin regular Infusion 1 Unit(s)/Hr (1 mL/Hr) IV Continuous <Continuous>  isosorbide   dinitrate Tablet (ISORDIL) 5 milliGRAM(s) Oral three times a day  magnesium sulfate  IVPB 1 Gram(s) IV Intermittent every 12 hours  metoprolol tartrate 25 milliGRAM(s) Oral two times a day  niCARdipine Infusion 5 mG/Hr (25 mL/Hr) IV Continuous <Continuous>  polyethylene glycol 3350 17 Gram(s) Oral daily    MEDICATIONS  (PRN):  glucagon  Injectable 1 milliGRAM(s) IntraMuscular once PRN Glucose LESS THAN 70 milligrams/deciliter  ondansetron  IVPB 4 milliGRAM(s) IV Intermittent once PRN Nausea and/or Vomiting  oxyCODONE    IR 10 milliGRAM(s) Oral every 4 hours PRN Severe Pain (7 - 10)  oxyCODONE    IR 5 milliGRAM(s) Oral every 4 hours PRN Moderate Pain (4 - 6)    Ambulation/Activity Status:    Assessment/Plan:  59y Male status-post CABG with  CVA ruled out by CT and CTA of head and neck.  - Case and plan discussed with CTU Intensivist and CT Surgeon - Dr. Yadav/Keyur/Gorge   - Continue CTU supportive care    - Continue DVT/GI prophylaxis  - Incentive Spirometry 10 times an hour  - Continue to advance physical activity as tolerated and continue PT/OT as directed  - CAD: Continue ASA, statin, Plavix  - increased metoprolol to 25 mg BID  - continue Isordil 5 mg q8h  - discontinue nitro drip for hypertension control  - increase Lantus 30, start prandial insulin 10 units with meals and stop insulin drip  - maintain diet  - repeat LFt's in AM 2/2 rising bilirubin/ alk phos  - will continue webber for I and O OPERATIVE PROCEDURE(s):                POD #  3 s/p CABg with radial                     59yMale  SURGEON(s): PRECIOUS Yadav  SUBJECTIVE ASSESSMENT: doing little better today, no confusion today  Vital Signs Last 24 Hrs  T(F): 101.3 (21 Mar 2020 04:24), Max: 103 (20 Mar 2020 21:30)  HR: 98 (21 Mar 2020 07:00) (89 - 119)  BP: 164/87 (20 Mar 2020 20:30) (117/73 - 164/87)  BP(mean): 118 (20 Mar 2020 20:30) (88 - 118)  ABP: 152/78 (21 Mar 2020 07:00) (93/44 - 171/80)  ABP(mean): 106 (21 Mar 2020 07:00)  RR: 43 (21 Mar 2020 07:00) (18 - 47)  SpO2: 100% (21 Mar 2020 07:00) (91% - 100%)    I&O's Detail    20 Mar 2020 07:01  -  21 Mar 2020 07:00  --------------------------------------------------------  IN:    Albumin 5%  - 500 mL: 2000 mL    insulin regular Infusion: 63 mL    IV PiggyBack: 900 mL    niCARdipine Infusion: 90 mL    nitroglycerin  Infusion: 110 mL    Oral Fluid: 240 mL    sodium chloride 0.9%: 40 mL    sodium chloride 0.9%.: 2000 mL  Total IN: 5443 mL    OUT:    Indwelling Catheter - Urethral: 5874 mL  Total OUT: 5874 mL    Net:   I&O's Detail    19 Mar 2020 07:01  -  20 Mar 2020 07:00  --------------------------------------------------------  Total NET: 331 mL    20 Mar 2020 07:01  -  21 Mar 2020 07:00  --------------------------------------------------------  Total NET: -431 mL    CAPILLARY BLOOD GLUCOSE  170 (20 Mar 2020 12:00)  201 (20 Mar 2020 11:00)  196 (20 Mar 2020 08:30)  104 (20 Mar 2020 04:45)    POCT Blood Glucose.: 119 mg/dL (21 Mar 2020 07:04)  POCT Blood Glucose.: 94 mg/dL (21 Mar 2020 05:06)  POCT Blood Glucose.: 106 mg/dL (21 Mar 2020 03:06)  POCT Blood Glucose.: 127 mg/dL (21 Mar 2020 00:24)  POCT Blood Glucose.: 130 mg/dL (20 Mar 2020 22:13)  POCT Blood Glucose.: 76 mg/dL (20 Mar 2020 20:45)  POCT Blood Glucose.: 111 mg/dL (20 Mar 2020 18:06)  POCT Blood Glucose.: 105 mg/dL (20 Mar 2020 16:07)  POCT Blood Glucose.: 110 mg/dL (20 Mar 2020 14:18)  POCT Blood Glucose.: 170 mg/dL (20 Mar 2020 12:12)  POCT Blood Glucose.: 201 mg/dL (20 Mar 2020 11:12)  POCT Blood Glucose.: 196 mg/dL (20 Mar 2020 08:38)    Physical Exam:  General: NAD; A&Ox3  Cardiac: S1/S2, RRR, no murmur, no rubs  Lungs: unlabored shallow respirations, bilateral bs decreased at bases with rales and rhonchi  Abdomen: Soft/NT/ND;   Sternum: Intact, no click, incision healing well with no drainage  Incisions: Incisions clean/dry/intact  Extremities: mild edema b/l lower extremities    Central Venous Catheter: Yes[]  unstable patient over last 24 hours  Webber Catheter: Yes  [] , strict I & O  EPICARDIAL WIRES:  [] YES                                               Day # 3  BOWEL MOVEMENT:   [] NO, If No, Timing since last BM:      Day # 3      LABS:                        8.8<L>  5.99  )-----------( 94<L>    ( 21 Mar 2020 01:10 )             25.5<L>                        9.9<L>  9.46  )-----------( 98<L>    ( 20 Mar 2020 02:00 )             29.4<L>    03-21    140  |  105  |  10  ----------------------------<  117<H>  3.7   |  22  |  0.8  03-20    139  |  103  |  10  ----------------------------<  149<H>  3.7   |  23  |  0.8    Ca    8.2<L>      21 Mar 2020 01:10  Mg     2.4     03-21    TPro  5.8<L> [6.0 - 8.0]  /  Alb  4.6 [3.5 - 5.2]  /  TBili  4.4<H> [0.2 - 1.2]  /  DBili  x   /  AST  40 [0 - 41]  /  ALT  30 [0 - 41]  /  AlkPhos  41 [30 - 115]  03-21    PT/INR - ( 20 Mar 2020 02:00 )   PT: ;   INR: 1.28 ratio       PTT - ( 20 Mar 2020 02:00 )  PTT:32.7 sec    ABG - ( 21 Mar 2020 04:28 )  pH: 7.48  /  pCO2: 31    /  pO2: 54    / HCO3: 23    / Base Excess: 0.2   /  SaO2: 92    /  LA: 1.0      MEDICATIONS  (STANDING):  albumin human  5% IVPB 500 milliLiter(s) IV Intermittent once  albuterol/ipratropium for Nebulization 3 milliLiter(s) Nebulizer every 6 hours  aspirin 325 milliGRAM(s) Oral daily  atorvastatin 40 milliGRAM(s) Oral at bedtime  benzocaine 20% Spray 1 Spray(s) Topical every 4 hours  chlorhexidine 4% Liquid 1 Application(s) Topical <User Schedule>  clopidogrel Tablet 75 milliGRAM(s) Oral daily  colchicine 0.6 milliGRAM(s) Oral every 12 hours  famotidine    Tablet 20 milliGRAM(s) Oral two times a day  guaiFENesin  milliGRAM(s) Oral every 12 hours  heparin  Injectable 5000 Unit(s) SubCutaneous every 12 hours  influenza   Vaccine 0.5 milliLiter(s) IntraMuscular once  insulin glargine Injectable (LANTUS) 20 Unit(s) SubCutaneous every morning  insulin regular Infusion 1 Unit(s)/Hr (1 mL/Hr) IV Continuous <Continuous>  isosorbide   dinitrate Tablet (ISORDIL) 5 milliGRAM(s) Oral three times a day  magnesium sulfate  IVPB 1 Gram(s) IV Intermittent every 12 hours  metoprolol tartrate 25 milliGRAM(s) Oral two times a day  niCARdipine Infusion 5 mG/Hr (25 mL/Hr) IV Continuous <Continuous>  polyethylene glycol 3350 17 Gram(s) Oral daily    MEDICATIONS  (PRN):  glucagon  Injectable 1 milliGRAM(s) IntraMuscular once PRN Glucose LESS THAN 70 milligrams/deciliter  ondansetron  IVPB 4 milliGRAM(s) IV Intermittent once PRN Nausea and/or Vomiting  oxyCODONE    IR 10 milliGRAM(s) Oral every 4 hours PRN Severe Pain (7 - 10)  oxyCODONE    IR 5 milliGRAM(s) Oral every 4 hours PRN Moderate Pain (4 - 6)    Ambulation/Activity Status:    Assessment/Plan:  59y Male status-post CABG with  CVA ruled out by CT and CTA of head and neck.  - Case and plan discussed with CTU Intensivist and CT Surgeon - Dr. Yadav/Gorge   - Continue CTU supportive care    - Continue DVT/GI prophylaxis  - Incentive Spirometry 10 times an hour  - Continue to advance physical activity as tolerated and continue PT/OT as directed  - CAD: Continue ASA, statin, Plavix  - increased metoprolol to 25 mg BID  - continue Isordil 5 mg q8h  - discontinue nitro drip for hypertension control  - increase Lantus 30, start prandial insulin 10 units with meals and stop insulin drip  - maintain diet  - repeat LFt's in AM 2/2 rising bilirubin/ alk phos  - will continue webber for I and O OPERATIVE PROCEDURE(s):                POD #  3 s/p CABg with radial                     59yMale  SURGEON(s): PRECIOUS Yadav  SUBJECTIVE ASSESSMENT: doing little better today, no confusion today  Vital Signs Last 24 Hrs  T(F): 101.3 (21 Mar 2020 04:24), Max: 103 (20 Mar 2020 21:30)  HR: 98 (21 Mar 2020 07:00) (89 - 119)  BP: 164/87 (20 Mar 2020 20:30) (117/73 - 164/87)  BP(mean): 118 (20 Mar 2020 20:30) (88 - 118)  ABP: 152/78 (21 Mar 2020 07:00) (93/44 - 171/80)  ABP(mean): 106 (21 Mar 2020 07:00)  RR: 43 (21 Mar 2020 07:00) (18 - 47)  SpO2: 100% (21 Mar 2020 07:00) (91% - 100%)    I&O's Detail    20 Mar 2020 07:01  -  21 Mar 2020 07:00  --------------------------------------------------------  IN:    Albumin 5%  - 500 mL: 2000 mL    insulin regular Infusion: 63 mL    IV PiggyBack: 900 mL    niCARdipine Infusion: 90 mL    nitroglycerin  Infusion: 110 mL    Oral Fluid: 240 mL    sodium chloride 0.9%: 40 mL    sodium chloride 0.9%.: 2000 mL  Total IN: 5443 mL    OUT:    Indwelling Catheter - Urethral: 5874 mL  Total OUT: 5874 mL    Net:   I&O's Detail    19 Mar 2020 07:01  -  20 Mar 2020 07:00  --------------------------------------------------------  Total NET: 331 mL    20 Mar 2020 07:01  -  21 Mar 2020 07:00  --------------------------------------------------------  Total NET: -431 mL    CAPILLARY BLOOD GLUCOSE  170 (20 Mar 2020 12:00)  201 (20 Mar 2020 11:00)  196 (20 Mar 2020 08:30)  104 (20 Mar 2020 04:45)    POCT Blood Glucose.: 119 mg/dL (21 Mar 2020 07:04)  POCT Blood Glucose.: 94 mg/dL (21 Mar 2020 05:06)  POCT Blood Glucose.: 106 mg/dL (21 Mar 2020 03:06)  POCT Blood Glucose.: 127 mg/dL (21 Mar 2020 00:24)  POCT Blood Glucose.: 130 mg/dL (20 Mar 2020 22:13)  POCT Blood Glucose.: 76 mg/dL (20 Mar 2020 20:45)  POCT Blood Glucose.: 111 mg/dL (20 Mar 2020 18:06)  POCT Blood Glucose.: 105 mg/dL (20 Mar 2020 16:07)  POCT Blood Glucose.: 110 mg/dL (20 Mar 2020 14:18)  POCT Blood Glucose.: 170 mg/dL (20 Mar 2020 12:12)  POCT Blood Glucose.: 201 mg/dL (20 Mar 2020 11:12)  POCT Blood Glucose.: 196 mg/dL (20 Mar 2020 08:38)    Physical Exam:  General: NAD; A&Ox3  Cardiac: S1/S2, RRR, no murmur, no rubs  Lungs: unlabored shallow respirations, bilateral bs decreased at bases with rales and rhonchi  Abdomen: Soft/NT/ND;   Sternum: Intact, no click, incision healing well with no drainage  Incisions: Incisions clean/dry/intact  Extremities: mild edema b/l lower extremities, moves all extremities, no weakness appreciated ambulated with PT    Central Venous Catheter: Yes[]  unstable patient over last 24 hours  Webber Catheter: Yes  [] , strict I & O  EPICARDIAL WIRES:  [] YES                                               Day # 3  BOWEL MOVEMENT:   [] NO, If No, Timing since last BM:      Day # 3      LABS:                        8.8<L>  5.99  )-----------( 94<L>    ( 21 Mar 2020 01:10 )             25.5<L>                        9.9<L>  9.46  )-----------( 98<L>    ( 20 Mar 2020 02:00 )             29.4<L>    03-21    140  |  105  |  10  ----------------------------<  117<H>  3.7   |  22  |  0.8  03-20    139  |  103  |  10  ----------------------------<  149<H>  3.7   |  23  |  0.8    Ca    8.2<L>      21 Mar 2020 01:10  Mg     2.4     03-21    TPro  5.8<L> [6.0 - 8.0]  /  Alb  4.6 [3.5 - 5.2]  /  TBili  4.4<H> [0.2 - 1.2]  /  DBili  x   /  AST  40 [0 - 41]  /  ALT  30 [0 - 41]  /  AlkPhos  41 [30 - 115]  03-21    PT/INR - ( 20 Mar 2020 02:00 )   PT: ;   INR: 1.28 ratio       PTT - ( 20 Mar 2020 02:00 )  PTT:32.7 sec    ABG - ( 21 Mar 2020 04:28 )  pH: 7.48  /  pCO2: 31    /  pO2: 54    / HCO3: 23    / Base Excess: 0.2   /  SaO2: 92    /  LA: 1.0      MEDICATIONS  (STANDING):  albumin human  5% IVPB 500 milliLiter(s) IV Intermittent once  albuterol/ipratropium for Nebulization 3 milliLiter(s) Nebulizer every 6 hours  aspirin 325 milliGRAM(s) Oral daily  atorvastatin 40 milliGRAM(s) Oral at bedtime  benzocaine 20% Spray 1 Spray(s) Topical every 4 hours  chlorhexidine 4% Liquid 1 Application(s) Topical <User Schedule>  clopidogrel Tablet 75 milliGRAM(s) Oral daily  colchicine 0.6 milliGRAM(s) Oral every 12 hours  famotidine    Tablet 20 milliGRAM(s) Oral two times a day  guaiFENesin  milliGRAM(s) Oral every 12 hours  heparin  Injectable 5000 Unit(s) SubCutaneous every 12 hours  influenza   Vaccine 0.5 milliLiter(s) IntraMuscular once  insulin glargine Injectable (LANTUS) 20 Unit(s) SubCutaneous every morning  insulin regular Infusion 1 Unit(s)/Hr (1 mL/Hr) IV Continuous <Continuous>  isosorbide   dinitrate Tablet (ISORDIL) 5 milliGRAM(s) Oral three times a day  magnesium sulfate  IVPB 1 Gram(s) IV Intermittent every 12 hours  metoprolol tartrate 25 milliGRAM(s) Oral two times a day  niCARdipine Infusion 5 mG/Hr (25 mL/Hr) IV Continuous <Continuous>  polyethylene glycol 3350 17 Gram(s) Oral daily    MEDICATIONS  (PRN):  glucagon  Injectable 1 milliGRAM(s) IntraMuscular once PRN Glucose LESS THAN 70 milligrams/deciliter  ondansetron  IVPB 4 milliGRAM(s) IV Intermittent once PRN Nausea and/or Vomiting  oxyCODONE    IR 10 milliGRAM(s) Oral every 4 hours PRN Severe Pain (7 - 10)  oxyCODONE    IR 5 milliGRAM(s) Oral every 4 hours PRN Moderate Pain (4 - 6)    Ambulation/Activity Status:    Assessment/Plan:  59y Male status-post CABG with  CVA ruled out by CT and CTA of head and neck.  - Case and plan discussed with CTU Intensivist and CT Surgeon - Dr. Yadav/Gorge   - Continue CTU supportive care    - Continue DVT/GI prophylaxis  - Incentive Spirometry 10 times an hour  - Continue to advance physical activity as tolerated and continue PT/OT as directed  - CAD: Continue ASA, statin, Plavix  - increased metoprolol to 25 mg BID  - continue Isordil 5 mg q8h  - discontinue nitro drip for hypertension control  - increase Lantus 30, start prandial insulin 10 units with meals and stop insulin drip  - maintain diet  - repeat LFt's in AM 2/2 rising bilirubin/ alk phos  - will continue webber for I and O

## 2020-03-22 LAB
ALBUMIN SERPL ELPH-MCNC: 4.4 G/DL — SIGNIFICANT CHANGE UP (ref 3.5–5.2)
ALBUMIN SERPL ELPH-MCNC: 4.4 G/DL — SIGNIFICANT CHANGE UP (ref 3.5–5.2)
ALP SERPL-CCNC: 46 U/L — SIGNIFICANT CHANGE UP (ref 30–115)
ALP SERPL-CCNC: 55 U/L — SIGNIFICANT CHANGE UP (ref 30–115)
ALT FLD-CCNC: 32 U/L — SIGNIFICANT CHANGE UP (ref 0–41)
ALT FLD-CCNC: 35 U/L — SIGNIFICANT CHANGE UP (ref 0–41)
ANION GAP SERPL CALC-SCNC: 11 MMOL/L — SIGNIFICANT CHANGE UP (ref 7–14)
ANION GAP SERPL CALC-SCNC: 12 MMOL/L — SIGNIFICANT CHANGE UP (ref 7–14)
ANION GAP SERPL CALC-SCNC: 13 MMOL/L — SIGNIFICANT CHANGE UP (ref 7–14)
AST SERPL-CCNC: 31 U/L — SIGNIFICANT CHANGE UP (ref 0–41)
AST SERPL-CCNC: 38 U/L — SIGNIFICANT CHANGE UP (ref 0–41)
BASOPHILS # BLD AUTO: 0.02 K/UL — SIGNIFICANT CHANGE UP (ref 0–0.2)
BASOPHILS NFR BLD AUTO: 0.4 % — SIGNIFICANT CHANGE UP (ref 0–1)
BILIRUB SERPL-MCNC: 2.3 MG/DL — HIGH (ref 0.2–1.2)
BILIRUB SERPL-MCNC: 3 MG/DL — HIGH (ref 0.2–1.2)
BUN SERPL-MCNC: 12 MG/DL — SIGNIFICANT CHANGE UP (ref 10–20)
BUN SERPL-MCNC: 13 MG/DL — SIGNIFICANT CHANGE UP (ref 10–20)
BUN SERPL-MCNC: 17 MG/DL — SIGNIFICANT CHANGE UP (ref 10–20)
CALCIUM SERPL-MCNC: 8.1 MG/DL — LOW (ref 8.5–10.1)
CALCIUM SERPL-MCNC: 8.8 MG/DL — SIGNIFICANT CHANGE UP (ref 8.5–10.1)
CALCIUM SERPL-MCNC: 8.9 MG/DL — SIGNIFICANT CHANGE UP (ref 8.5–10.1)
CHLORIDE SERPL-SCNC: 101 MMOL/L — SIGNIFICANT CHANGE UP (ref 98–110)
CHLORIDE SERPL-SCNC: 102 MMOL/L — SIGNIFICANT CHANGE UP (ref 98–110)
CHLORIDE SERPL-SCNC: 103 MMOL/L — SIGNIFICANT CHANGE UP (ref 98–110)
CO2 SERPL-SCNC: 23 MMOL/L — SIGNIFICANT CHANGE UP (ref 17–32)
CO2 SERPL-SCNC: 26 MMOL/L — SIGNIFICANT CHANGE UP (ref 17–32)
CO2 SERPL-SCNC: 26 MMOL/L — SIGNIFICANT CHANGE UP (ref 17–32)
CREAT SERPL-MCNC: 0.7 MG/DL — SIGNIFICANT CHANGE UP (ref 0.7–1.5)
CREAT SERPL-MCNC: 0.7 MG/DL — SIGNIFICANT CHANGE UP (ref 0.7–1.5)
CREAT SERPL-MCNC: 0.9 MG/DL — SIGNIFICANT CHANGE UP (ref 0.7–1.5)
EOSINOPHIL # BLD AUTO: 0.23 K/UL — SIGNIFICANT CHANGE UP (ref 0–0.7)
EOSINOPHIL NFR BLD AUTO: 4.1 % — SIGNIFICANT CHANGE UP (ref 0–8)
GLUCOSE BLDC GLUCOMTR-MCNC: 100 MG/DL — HIGH (ref 70–99)
GLUCOSE BLDC GLUCOMTR-MCNC: 112 MG/DL — HIGH (ref 70–99)
GLUCOSE BLDC GLUCOMTR-MCNC: 159 MG/DL — HIGH (ref 70–99)
GLUCOSE BLDC GLUCOMTR-MCNC: 165 MG/DL — HIGH (ref 70–99)
GLUCOSE SERPL-MCNC: 100 MG/DL — HIGH (ref 70–99)
GLUCOSE SERPL-MCNC: 118 MG/DL — HIGH (ref 70–99)
GLUCOSE SERPL-MCNC: 161 MG/DL — HIGH (ref 70–99)
HCT VFR BLD CALC: 26.7 % — LOW (ref 42–52)
HCT VFR BLD CALC: 28.2 % — LOW (ref 42–52)
HGB BLD-MCNC: 9 G/DL — LOW (ref 14–18)
HGB BLD-MCNC: 9.7 G/DL — LOW (ref 14–18)
IMM GRANULOCYTES NFR BLD AUTO: 0.4 % — HIGH (ref 0.1–0.3)
LYMPHOCYTES # BLD AUTO: 1.21 K/UL — SIGNIFICANT CHANGE UP (ref 1.2–3.4)
LYMPHOCYTES # BLD AUTO: 21.5 % — SIGNIFICANT CHANGE UP (ref 20.5–51.1)
MAGNESIUM SERPL-MCNC: 2.3 MG/DL — SIGNIFICANT CHANGE UP (ref 1.8–2.4)
MAGNESIUM SERPL-MCNC: 2.4 MG/DL — SIGNIFICANT CHANGE UP (ref 1.8–2.4)
MAGNESIUM SERPL-MCNC: 2.6 MG/DL — HIGH (ref 1.8–2.4)
MCHC RBC-ENTMCNC: 30.8 PG — SIGNIFICANT CHANGE UP (ref 27–31)
MCHC RBC-ENTMCNC: 31 PG — SIGNIFICANT CHANGE UP (ref 27–31)
MCHC RBC-ENTMCNC: 33.7 G/DL — SIGNIFICANT CHANGE UP (ref 32–37)
MCHC RBC-ENTMCNC: 34.4 G/DL — SIGNIFICANT CHANGE UP (ref 32–37)
MCV RBC AUTO: 90.1 FL — SIGNIFICANT CHANGE UP (ref 80–94)
MCV RBC AUTO: 91.4 FL — SIGNIFICANT CHANGE UP (ref 80–94)
MONOCYTES # BLD AUTO: 0.92 K/UL — HIGH (ref 0.1–0.6)
MONOCYTES NFR BLD AUTO: 16.3 % — HIGH (ref 1.7–9.3)
NEUTROPHILS # BLD AUTO: 3.23 K/UL — SIGNIFICANT CHANGE UP (ref 1.4–6.5)
NEUTROPHILS NFR BLD AUTO: 57.3 % — SIGNIFICANT CHANGE UP (ref 42.2–75.2)
NRBC # BLD: 0 /100 WBCS — SIGNIFICANT CHANGE UP (ref 0–0)
NRBC # BLD: 0 /100 WBCS — SIGNIFICANT CHANGE UP (ref 0–0)
PLATELET # BLD AUTO: 126 K/UL — LOW (ref 130–400)
PLATELET # BLD AUTO: 174 K/UL — SIGNIFICANT CHANGE UP (ref 130–400)
POTASSIUM SERPL-MCNC: 3.6 MMOL/L — SIGNIFICANT CHANGE UP (ref 3.5–5)
POTASSIUM SERPL-MCNC: 3.7 MMOL/L — SIGNIFICANT CHANGE UP (ref 3.5–5)
POTASSIUM SERPL-MCNC: 4.4 MMOL/L — SIGNIFICANT CHANGE UP (ref 3.5–5)
POTASSIUM SERPL-SCNC: 3.6 MMOL/L — SIGNIFICANT CHANGE UP (ref 3.5–5)
POTASSIUM SERPL-SCNC: 3.7 MMOL/L — SIGNIFICANT CHANGE UP (ref 3.5–5)
POTASSIUM SERPL-SCNC: 4.4 MMOL/L — SIGNIFICANT CHANGE UP (ref 3.5–5)
PROT SERPL-MCNC: 5.7 G/DL — LOW (ref 6–8)
PROT SERPL-MCNC: 6.1 G/DL — SIGNIFICANT CHANGE UP (ref 6–8)
RBC # BLD: 2.92 M/UL — LOW (ref 4.7–6.1)
RBC # BLD: 3.13 M/UL — LOW (ref 4.7–6.1)
RBC # FLD: 12.8 % — SIGNIFICANT CHANGE UP (ref 11.5–14.5)
RBC # FLD: 13 % — SIGNIFICANT CHANGE UP (ref 11.5–14.5)
SODIUM SERPL-SCNC: 138 MMOL/L — SIGNIFICANT CHANGE UP (ref 135–146)
SODIUM SERPL-SCNC: 139 MMOL/L — SIGNIFICANT CHANGE UP (ref 135–146)
SODIUM SERPL-SCNC: 140 MMOL/L — SIGNIFICANT CHANGE UP (ref 135–146)
WBC # BLD: 5.63 K/UL — SIGNIFICANT CHANGE UP (ref 4.8–10.8)
WBC # BLD: 6.19 K/UL — SIGNIFICANT CHANGE UP (ref 4.8–10.8)
WBC # FLD AUTO: 5.63 K/UL — SIGNIFICANT CHANGE UP (ref 4.8–10.8)
WBC # FLD AUTO: 6.19 K/UL — SIGNIFICANT CHANGE UP (ref 4.8–10.8)

## 2020-03-22 PROCEDURE — 99232 SBSQ HOSP IP/OBS MODERATE 35: CPT

## 2020-03-22 PROCEDURE — 74018 RADEX ABDOMEN 1 VIEW: CPT | Mod: 26

## 2020-03-22 PROCEDURE — 71045 X-RAY EXAM CHEST 1 VIEW: CPT | Mod: 26

## 2020-03-22 PROCEDURE — 93010 ELECTROCARDIOGRAM REPORT: CPT

## 2020-03-22 PROCEDURE — 99233 SBSQ HOSP IP/OBS HIGH 50: CPT

## 2020-03-22 RX ORDER — KETOROLAC TROMETHAMINE 30 MG/ML
30 SYRINGE (ML) INJECTION ONCE
Refills: 0 | Status: DISCONTINUED | OUTPATIENT
Start: 2020-03-22 | End: 2020-03-22

## 2020-03-22 RX ORDER — POTASSIUM CHLORIDE 20 MEQ
40 PACKET (EA) ORAL ONCE
Refills: 0 | Status: COMPLETED | OUTPATIENT
Start: 2020-03-22 | End: 2020-03-22

## 2020-03-22 RX ORDER — POTASSIUM CHLORIDE 20 MEQ
20 PACKET (EA) ORAL
Refills: 0 | Status: COMPLETED | OUTPATIENT
Start: 2020-03-22 | End: 2020-03-22

## 2020-03-22 RX ORDER — TAMSULOSIN HYDROCHLORIDE 0.4 MG/1
0.4 CAPSULE ORAL AT BEDTIME
Refills: 0 | Status: DISCONTINUED | OUTPATIENT
Start: 2020-03-22 | End: 2020-03-23

## 2020-03-22 RX ORDER — POTASSIUM CHLORIDE 20 MEQ
20 PACKET (EA) ORAL ONCE
Refills: 0 | Status: COMPLETED | OUTPATIENT
Start: 2020-03-22 | End: 2020-03-22

## 2020-03-22 RX ORDER — TEMAZEPAM 15 MG/1
15 CAPSULE ORAL AT BEDTIME
Refills: 0 | Status: DISCONTINUED | OUTPATIENT
Start: 2020-03-22 | End: 2020-03-23

## 2020-03-22 RX ORDER — FUROSEMIDE 40 MG
40 TABLET ORAL ONCE
Refills: 0 | Status: COMPLETED | OUTPATIENT
Start: 2020-03-22 | End: 2020-03-22

## 2020-03-22 RX ORDER — ISOSORBIDE MONONITRATE 60 MG/1
30 TABLET, EXTENDED RELEASE ORAL DAILY
Refills: 0 | Status: DISCONTINUED | OUTPATIENT
Start: 2020-03-22 | End: 2020-03-23

## 2020-03-22 RX ADMIN — Medication 325 MILLIGRAM(S): at 11:53

## 2020-03-22 RX ADMIN — OXYCODONE HYDROCHLORIDE 5 MILLIGRAM(S): 5 TABLET ORAL at 08:20

## 2020-03-22 RX ADMIN — Medication 2: at 11:52

## 2020-03-22 RX ADMIN — Medication 40 MILLIGRAM(S): at 12:30

## 2020-03-22 RX ADMIN — FAMOTIDINE 20 MILLIGRAM(S): 10 INJECTION INTRAVENOUS at 06:01

## 2020-03-22 RX ADMIN — OXYCODONE HYDROCHLORIDE 10 MILLIGRAM(S): 5 TABLET ORAL at 22:09

## 2020-03-22 RX ADMIN — Medication 20 MILLIEQUIVALENT(S): at 15:29

## 2020-03-22 RX ADMIN — OXYCODONE HYDROCHLORIDE 5 MILLIGRAM(S): 5 TABLET ORAL at 01:30

## 2020-03-22 RX ADMIN — Medication 100 GRAM(S): at 17:24

## 2020-03-22 RX ADMIN — CHLORHEXIDINE GLUCONATE 1 APPLICATION(S): 213 SOLUTION TOPICAL at 06:01

## 2020-03-22 RX ADMIN — Medication 40 MILLIEQUIVALENT(S): at 18:00

## 2020-03-22 RX ADMIN — Medication 100 GRAM(S): at 06:39

## 2020-03-22 RX ADMIN — Medication 3 MILLILITER(S): at 14:44

## 2020-03-22 RX ADMIN — Medication 3 MILLILITER(S): at 19:40

## 2020-03-22 RX ADMIN — TAMSULOSIN HYDROCHLORIDE 0.4 MILLIGRAM(S): 0.4 CAPSULE ORAL at 22:06

## 2020-03-22 RX ADMIN — ISOSORBIDE DINITRATE 5 MILLIGRAM(S): 5 TABLET ORAL at 06:01

## 2020-03-22 RX ADMIN — HEPARIN SODIUM 5000 UNIT(S): 5000 INJECTION INTRAVENOUS; SUBCUTANEOUS at 17:21

## 2020-03-22 RX ADMIN — Medication 600 MILLIGRAM(S): at 17:22

## 2020-03-22 RX ADMIN — OXYCODONE HYDROCHLORIDE 5 MILLIGRAM(S): 5 TABLET ORAL at 07:50

## 2020-03-22 RX ADMIN — OXYCODONE HYDROCHLORIDE 10 MILLIGRAM(S): 5 TABLET ORAL at 16:15

## 2020-03-22 RX ADMIN — Medication 650 MILLIGRAM(S): at 01:59

## 2020-03-22 RX ADMIN — ATORVASTATIN CALCIUM 40 MILLIGRAM(S): 80 TABLET, FILM COATED ORAL at 22:06

## 2020-03-22 RX ADMIN — Medication 3 MILLILITER(S): at 08:57

## 2020-03-22 RX ADMIN — Medication 25 MILLIGRAM(S): at 17:22

## 2020-03-22 RX ADMIN — Medication 10 UNIT(S): at 11:51

## 2020-03-22 RX ADMIN — FAMOTIDINE 20 MILLIGRAM(S): 10 INJECTION INTRAVENOUS at 17:22

## 2020-03-22 RX ADMIN — Medication 30 MILLIGRAM(S): at 11:45

## 2020-03-22 RX ADMIN — Medication 25 MILLIGRAM(S): at 06:01

## 2020-03-22 RX ADMIN — Medication 600 MILLIGRAM(S): at 06:01

## 2020-03-22 RX ADMIN — Medication 100 MILLIEQUIVALENT(S): at 18:03

## 2020-03-22 RX ADMIN — Medication 3 MILLIGRAM(S): at 22:07

## 2020-03-22 RX ADMIN — INSULIN GLARGINE 30 UNIT(S): 100 INJECTION, SOLUTION SUBCUTANEOUS at 07:20

## 2020-03-22 RX ADMIN — Medication 650 MILLIGRAM(S): at 02:52

## 2020-03-22 RX ADMIN — Medication 40 MILLIEQUIVALENT(S): at 06:02

## 2020-03-22 RX ADMIN — Medication 20 MILLIEQUIVALENT(S): at 11:53

## 2020-03-22 RX ADMIN — OXYCODONE HYDROCHLORIDE 5 MILLIGRAM(S): 5 TABLET ORAL at 02:00

## 2020-03-22 RX ADMIN — Medication 10 UNIT(S): at 17:22

## 2020-03-22 RX ADMIN — OXYCODONE HYDROCHLORIDE 10 MILLIGRAM(S): 5 TABLET ORAL at 23:00

## 2020-03-22 RX ADMIN — Medication 30 MILLIGRAM(S): at 11:30

## 2020-03-22 RX ADMIN — ISOSORBIDE MONONITRATE 30 MILLIGRAM(S): 60 TABLET, EXTENDED RELEASE ORAL at 15:29

## 2020-03-22 RX ADMIN — HEPARIN SODIUM 5000 UNIT(S): 5000 INJECTION INTRAVENOUS; SUBCUTANEOUS at 06:01

## 2020-03-22 RX ADMIN — CLOPIDOGREL BISULFATE 75 MILLIGRAM(S): 75 TABLET, FILM COATED ORAL at 11:53

## 2020-03-22 NOTE — PROGRESS NOTE ADULT - SUBJECTIVE AND OBJECTIVE BOX
FATIMAH EASTMAN    Interval: No neurologic change.    HPI:  59 yrs old with Hx of CAD, D , DL, HTN is here for elective LHC. Patient c/o progressive chest pain on exertion.  Pt had abnormal stress test. (16 Mar 2020 10:28)      Relevant PMH:  [] Prior ischemic stroke/TIA  [] Afib  []CAD  []HTN  []DLD  []DM []PVD []Obesity [] Sedintary lifestyle []CHF  []FERNY  []Cancer Hx     Social History: [] Smoking []  Drug Use: []   Alcohol Use:   [] Other:      Possible Location of Stroke:    Possible Cause of Stroke:    Relevant Cerebral Imaging:    Relevant Cervicocerebral Imaging:  CT Angio Neck w/ IV Cont:   EXAM:  CT ANGIO BRAIN (W)AW IC        EXAM:  CT ANGIO NECK (W)AW IC            PROCEDURE DATE:  03/20/2020            INTERPRETATION:  Clinical History / Reason for exam: Confusion. Stroke code.    Technique: CT angiogram of the head and neck. CTA of the head and neck was performed following the intravenous administration of 100 cc Optiray 320 (0 cc discarded) with coronal, sagittal and multiple 3-D MIP and volume rendered reformats.    Comparison: None    Findings:     NECK:  The visualized aortic arch and great vessel origins are patent.  The common, internal and external carotid arteries are patent.  The vertebral arteries are patent.    HEAD:  The distal internal carotid arteries are patent. The anterior and middle cerebral arteries arepatent. There is mild stenosis of the right MCA M1 segment.    The distal vertebral arteries are patent.  The basilar artery is patent. There is moderate stenosis of the distal P2 segment of the right posterior cerebral artery.    OTHER:  Mild cervical spine degenerative changes.  Right neck hemodialysis catheter.  Small bilateral pleural effusions with subjacent compressive atelectasis.    IMPRESSION:     1.  No evidence of acute large vessel occlusion.    2.  Mild stenosis of the right MCA proximal M1 segment. Moderate stenosis of the right PCA distal P2 segment.                  KOREY JOAQUIN M.D., ATTENDING RADIOLOGIST  This document has been electronically signed. Mar 20 2020 12:25PM             (03-20-20 @ 11:16)    CT Angio Head w/ IV Cont:   EXAM:  CT ANGIO BRAIN (W)AW IC        EXAM:  CT ANGIO NECK (W)AW IC            PROCEDURE DATE:  03/20/2020            INTERPRETATION:  Clinical History / Reason for exam: Confusion. Stroke code.    Technique: CT angiogram of the head and neck. CTA of the head and neck was performed following the intravenous administration of 100 cc Optiray 320 (0 cc discarded) with coronal, sagittal and multiple 3-D MIP and volume rendered reformats.    Comparison: None    Findings:     NECK:  The visualized aortic arch and great vessel origins are patent.  The common, internal and external carotid arteries are patent.  The vertebral arteries are patent.    HEAD:  The distal internal carotid arteries are patent. The anterior and middle cerebral arteries arepatent. There is mild stenosis of the right MCA M1 segment.    The distal vertebral arteries are patent.  The basilar artery is patent. There is moderate stenosis of the distal P2 segment of the right posterior cerebral artery.    OTHER:  Mild cervical spine degenerative changes.  Right neck hemodialysis catheter.  Small bilateral pleural effusions with subjacent compressive atelectasis.    IMPRESSION:     1.  No evidence of acute large vessel occlusion.    2.  Mild stenosis of the right MCA proximal M1 segment. Moderate stenosis of the right PCA distal P2 segment.                  KOREY JOAQUIN M.D., ATTENDING RADIOLOGIST  This document has been electronically signed. Mar 20 2020 12:25PM             (03-20-20 @ 11:16)        Relevant blood tests:  Direct LDL: 85 mg/dL [4 - 129] (03-16-20 @ 14:55)  Hemoglobin A1C, Whole Blood: 9.1 % (03-16-20 @ 14:55)      Relevant cardiac rhythm monitoring:    Relevant Cardiac Structure:(TTE/MARANDA +/-):[]No intracardiac thrombus/[] no vegetation/[]no [place]ia/EF:      Home Medications:  aspirin 81 mg oral tablet: 1 tab(s) orally once a day (16 Mar 2020 10:33)  Lipitor 40 mg oral tablet: 1 tab(s) orally once a day (16 Mar 2020 10:33)  metFORMIN 500 mg oral tablet: 1 tab(s) orally 2 times a day (16 Mar 2020 10:33)  metoprolol succinate 25 mg oral tablet, extended release: 1 tab(s) orally once a day (16 Mar 2020 10:33)  Plavix 75 mg oral tablet: 1 tab(s) orally once a day (16 Mar 2020 10:33)      MEDICATIONS  (STANDING):  albumin human  5% IVPB 500 milliLiter(s) IV Intermittent once  albuterol/ipratropium for Nebulization 3 milliLiter(s) Nebulizer every 6 hours  aspirin 325 milliGRAM(s) Oral daily  atorvastatin 40 milliGRAM(s) Oral at bedtime  benzocaine 20% Spray 1 Spray(s) Topical every 4 hours  chlorhexidine 4% Liquid 1 Application(s) Topical <User Schedule>  clopidogrel Tablet 75 milliGRAM(s) Oral daily  colchicine 0.6 milliGRAM(s) Oral every 12 hours  dextrose 50% Injectable 50 milliLiter(s) IV Push every 15 minutes  dextrose 50% Injectable 25 milliLiter(s) IV Push every 15 minutes  famotidine    Tablet 20 milliGRAM(s) Oral two times a day  guaiFENesin  milliGRAM(s) Oral every 12 hours  heparin  Injectable 5000 Unit(s) SubCutaneous every 12 hours  influenza   Vaccine 0.5 milliLiter(s) IntraMuscular once  insulin glargine Injectable (LANTUS) 20 Unit(s) SubCutaneous every morning  insulin regular Infusion 1 Unit(s)/Hr (1 mL/Hr) IV Continuous <Continuous>  isosorbide   dinitrate Tablet (ISORDIL) 5 milliGRAM(s) Oral three times a day  magnesium sulfate  IVPB 1 Gram(s) IV Intermittent every 12 hours  metoprolol tartrate 25 milliGRAM(s) Oral two times a day  niCARdipine Infusion 5 mG/Hr (25 mL/Hr) IV Continuous <Continuous>  polyethylene glycol 3350 17 Gram(s) Oral daily  sodium chloride 0.9%. 1000 milliLiter(s) (100 mL/Hr) IV Continuous <Continuous>      PT/OT/Speech/Rehab/S&Swr:    Exam:    NIHSS      LOC:       1a:  0   1b(Questions):  0         1c(Instructions):  0           Best Gaze:0  Visual: 2 (right)  Motor:                 RUE: 1    RLE: 0    LUE: 0    LLE: 0    FACE:  0   Limb Ataxia:0  Sensory:     0  Language:  0     Dysarthria:  0        Extinction and Inattention: 0    NIHSS on admission:          NIHSS yesterday:          NIHSS today:   3          m-RS: 3

## 2020-03-22 NOTE — PROGRESS NOTE ADULT - ATTENDING COMMENTS
POD 3 after CABG  pt mildly dyspneic on nonrebreather mask  PO2 >300  wean to NC  pt needs diuresis today  cont ASA/SQH/BB  Cont pulmonary toilet, Chest PT, pain control, Incentive spirometry  off insulin gtt, on long acting and short acting insulin, FS< 200  OOB ambulate  case d/w CTU team
POD 4 after CABG  cont weaning NC O2  cont diuresis (negative 500cc/24hrs),   cont ASA/SQH/BB/isordil  Cont pulmonary toilet, Chest PT, pain control (can add toradol), Incentive spirometry  off insulin gtt, on long acting and short acting insulin, FS< 200  OOB ambulate  neuro f/u appreciated  abd moderately distended, not tympanic, +BS, small flatus  case d/w CTU team
Patient examined this afternoon and speech was improved.  Still has right homonymous hemianopsia.
Patient examined this afternoon and was manifesting communication difficulty which seems worse that last night's exam.      Recommend:  1.  MRI brain w/o contrast if not otherwise contraindicated.  2.  Routine EEG.  3.  Continue aggressive measures for intracranial vascular disease including antiplatlets and high intensity statins.

## 2020-03-22 NOTE — PROGRESS NOTE ADULT - SUBJECTIVE AND OBJECTIVE BOX
CTU Attending Progress Daily Note     22 Mar 2020 09:21    Procedure:           CABG                                       POD#      4             Patient seen as post-op critical care follow-up    HPI:  59 yrs old with Hx of CAD, D , DL, HTN is here for elective LHC. Patient c/o progressive chest pain on exertion.  Pt had abnormal stress test. (16 Mar 2020 10:28)    See preop testing chart H&P    Interval event for past 24 hr:  FATIMAH EASTMAN  59y had no event.     Current Complains:  FATIMAH EASTMAN has no new complaints    REVIEW OF SYSTEMS:  CONSTITUTIONAL:  [-] weakness, [-] fevers, [-] chills  EYES/ENT: [-] visual changes, [-] vertigo, [-] throat pain   NECK: [-] pain, [-] stiffness  RESPIRATORY: [-] cough, [-] wheezing, [-] hemoptysis, [-] shortness of breath  CARDIOVASCULAR: [-] chest pain, [-] palpitations, [-] orthopnea  GASTROINTESTINAL:    [-]abdominal pain, [-] nausea, [-] vomiting, [-] hematemesis, [-] diarrhea, [-] constipation, [-] melena, [-] hematochezia.  GENITOURINARY: [-] dysuria, [-] frequency, [-] hematuria  NEUROLOGICAL: [-] numbness, [-] weakness  SKIN: [-] itching, [-] burning, [-] rashes, [-] lesions   All other review of systems is negative unless indicated above.    [  ] Unable to assess ROS because :    OBJECTIVE:  ICU Vital Signs Last 24 Hrs  T(C): 37.7 (22 Mar 2020 04:00), Max: 38 (21 Mar 2020 17:00)  T(F): 99.9 (22 Mar 2020 04:00), Max: 100.4 (21 Mar 2020 17:00)  HR: 82 (22 Mar 2020 07:00) (82 - 100)  BP: 143/79 (21 Mar 2020 20:16) (137/84 - 143/79)  BP(mean): 104 (21 Mar 2020 20:16) (104 - 105)  ABP: 150/73 (22 Mar 2020 07:00) (124/62 - 158/79)  ABP(mean): 101 (22 Mar 2020 07:00) (84 - 109)  RR: 20 (22 Mar 2020 07:00) (17 - 30)  SpO2: 100% (22 Mar 2020 07:00) (94% - 100%)      I&O's Summary    21 Mar 2020 07:01  -  22 Mar 2020 07:00  --------------------------------------------------------  IN: 1821 mL / OUT: 2343 mL / NET: -522 mL      PHYSICAL EXAM:  General: WN/WD NAD    HEENT:     [+] NCAT  [+] EOMI  [-] Conjuctival edema   [-] Icterus   [-] Thrush   [-] ETT  [-] NGT/OGT    Neck:         [+] FROM   [-] JVD     [-] Nodes     [-] Masses    [+] Mid-line trachea    [-] Tracheostomy    Chest:         [-] Sternal click   [-] Sternal drainage   [+] Pacing wires   [-] Chest tubes   [-] SubQ emphysema    Lungs:          [+] CTA   [-] Rhonchi   [-] Rales    [-] Wheezing    [-] Decreased BS    [-] Dullness R L    Cardiac:       [+] S1 [+] S2    [+] RRR   [-] Irregular   [-] S3   [-] S4    [-] Murmurs    [-] Rub    Abdomen:    [+] BS    [+] Soft    [+] Non-tender     [-] Distended    [-] Organomegaly  [-] PEG    Extremities:   [-] Cyanosis U/L   [-] Clubbing  U/L  [-] LE/UE Edema   [+] Capillary refill    [+] Pulses     Neuro:        [+] Awake   [+]  Alert   [-] Confused   [-] Lethargic   [-] Sedated   [-] Generalized Weakness    Skin:        [-] Rashes    [-] Erythema   [+] Normal incisions   [+] IV sites intact   [-] Sacral decubitus    Tubes:  LINES:    CAPILLARY BLOOD GLUCOSE  100 (21 Mar 2020 16:00)      POCT Blood Glucose.: 100 mg/dL (22 Mar 2020 06:47)    CAPILLARY BLOOD GLUCOSE  100 (21 Mar 2020 16:00)  178 (21 Mar 2020 11:00)      POCT Blood Glucose.: 100 mg/dL (22 Mar 2020 06:47)  POCT Blood Glucose.: 109 mg/dL (21 Mar 2020 21:34)  POCT Blood Glucose.: 100 mg/dL (21 Mar 2020 16:18)  POCT Blood Glucose.: 178 mg/dL (21 Mar 2020 11:16)      HOSPITAL MEDICATIONS:  MEDICATIONS  (STANDING):  albumin human  5% IVPB 500 milliLiter(s) IV Intermittent once  albuterol/ipratropium for Nebulization 3 milliLiter(s) Nebulizer every 6 hours  aspirin 325 milliGRAM(s) Oral daily  atorvastatin 40 milliGRAM(s) Oral at bedtime  chlorhexidine 4% Liquid 1 Application(s) Topical <User Schedule>  clopidogrel Tablet 75 milliGRAM(s) Oral daily  dextrose 50% Injectable 50 milliLiter(s) IV Push every 15 minutes  dextrose 50% Injectable 25 milliLiter(s) IV Push every 15 minutes  famotidine    Tablet 20 milliGRAM(s) Oral two times a day  guaiFENesin  milliGRAM(s) Oral every 12 hours  heparin  Injectable 5000 Unit(s) SubCutaneous every 12 hours  influenza   Vaccine 0.5 milliLiter(s) IntraMuscular once  insulin glargine Injectable (LANTUS) 30 Unit(s) SubCutaneous every morning  insulin lispro (HumaLOG) corrective regimen sliding scale   SubCutaneous three times a day before meals  insulin lispro Injectable (HumaLOG) 10 Unit(s) SubCutaneous three times a day before meals  isosorbide   mononitrate ER Tablet (IMDUR) 30 milliGRAM(s) Oral daily  magnesium sulfate  IVPB 1 Gram(s) IV Intermittent every 12 hours  melatonin 3 milliGRAM(s) Oral at bedtime  metoprolol tartrate 25 milliGRAM(s) Oral two times a day  sodium chloride 0.9%. 1000 milliLiter(s) (10 mL/Hr) IV Continuous <Continuous>    MEDICATIONS  (PRN):  acetaminophen   Tablet .. 650 milliGRAM(s) Oral every 6 hours PRN Temp greater or equal to 38C (100.4F), Moderate Pain (4 - 6)  glucagon  Injectable 1 milliGRAM(s) IntraMuscular once PRN Glucose LESS THAN 70 milligrams/deciliter  ondansetron  IVPB 4 milliGRAM(s) IV Intermittent once PRN Nausea and/or Vomiting  oxyCODONE    IR 10 milliGRAM(s) Oral every 4 hours PRN Severe Pain (7 - 10)  oxyCODONE    IR 5 milliGRAM(s) Oral every 4 hours PRN Moderate Pain (4 - 6)      LABS:  ABG - ( 21 Mar 2020 09:43 )  pH, Arterial: 7.45  pH, Blood: x     /  pCO2: 33    /  pO2: 385   / HCO3: 23    / Base Excess: -1.0  /  SaO2: 100                                     9.0    5.63  )-----------( 126      ( 22 Mar 2020 01:30 )             26.7     03-22    138  |  103  |  12  ----------------------------<  100<H>  3.7   |  23  |  0.7    Ca    8.1<L>      22 Mar 2020 01:30  Mg     2.3     03-22    TPro  5.7<L>  /  Alb  4.4  /  TBili  3.0<H>  /  DBili  x   /  AST  31  /  ALT  32  /  AlkPhos  46  03-22            RADIOLOGY:  Reviewed and interpreted by me  CXR from 03-22-20 shows [+] mild congestion, [-] pneumothorax, [-] R/L effusion, [-] cardiomegaly,       ECG:  Reviewed and interpreted by me: SR 80  QTC: 461    Assessment:  CAD SP CABG  Acute blood loss anemia      PAST MEDICAL & SURGICAL HISTORY:  CAD (coronary artery disease): s/p PTCA WITH 2 STENTS  H/O hyperlipidemia  H/O: HTN (hypertension)  DM2 (diabetes mellitus, type 2)  History of tonsillectomy  H/O knee surgery      PLAN:  Neuro: Pain control  Pulm: Encourage coughing, deep breathing and use of incentive spirometry. Wean off supplemental oxygen as able. Daily CXR.   Cardio: Monitor telemetry/alarms. Continue cardiac meds  GI: Tolerating diet. Continue stool softeners. Continue GI prophylaxis  Renal: monitor urine output, supplement electrolytes as needed  Vasc: Heparin SC/SCDs for DVT prophylaxis  Heme: Monitor H/H.   ID: Off antibiotics. Stable.  Endocrine: Monitor finger stick blood sugar and control hyperglycemia with insulin  Physical Therapy: OOB/ambulate        Discussed with Cardiothoracic Team at AM rounds.

## 2020-03-22 NOTE — PROGRESS NOTE ADULT - ASSESSMENT
59 years old right handed gentleman who recently underwent s/p CABG 3 days ago, developed HA, AMS and right homonymous hemianopsia and Stroke Code was called earlier today. Initial NIHSS 5 STAT CT head reports of no intracranial pathology. Consulted by Dr. Packer for possible neuroendovascular intervention, and when patient seen at bedside noted to have worsening confusion with repeat NIHSS 9. CTA head/neck reports  no LVO, with mild stenosis of left M1 and moderate stenosis of right distal P2 segment.      Diagnosis:  1. Acute stroke, L M1 and R P2 stenosis    Plan:  - Q4 neurochecks  - Continue DAPT  - Statin  - MRI brain  - Maintain -180  - PT/OT

## 2020-03-22 NOTE — PROGRESS NOTE ADULT - SUBJECTIVE AND OBJECTIVE BOX
OPERATIVE PROCEDURE(s):                POD #   4 CABG with radial                    59yMale  SURGEON(s): PRECIOUS Yadav  SUBJECTIVE ASSESSMENT: doing better  Vital Signs Last 24 Hrs  T(F): 99.9 (22 Mar 2020 04:00), Max: 100.4 (21 Mar 2020 17:00)  HR: 82 (22 Mar 2020 07:00) (82 - 100)  BP: 143/79 (21 Mar 2020 20:16) (137/84 - 143/79)  BP(mean): 104 (21 Mar 2020 20:16) (104 - 105)  ABP: 150/73 (22 Mar 2020 07:00) (124/62 - 158/79)  ABP(mean): 101 (22 Mar 2020 07:00)  RR: 20 (22 Mar 2020 07:00) (17 - 31)  SpO2: 100% (22 Mar 2020 07:00) (94% - 100%)    I&O's Detail    21 Mar 2020 07:01  -  22 Mar 2020 07:00  --------------------------------------------------------  IN:    IV PiggyBack: 200 mL    Oral Fluid: 1421 mL    sodium chloride 0.9%.: 200 mL  Total IN: 1821 mL    OUT:    Indwelling Catheter - Urethral: 1055 mL    Voided: 1288 mL  Total OUT: 2343 mL    Net:   I&O's Detail    20 Mar 2020 07:01  -  21 Mar 2020 07:00  --------------------------------------------------------  Total NET: -431 mL      21 Mar 2020 07:01  -  22 Mar 2020 07:00  --------------------------------------------------------  Total NET: -522 mL        CAPILLARY BLOOD GLUCOSE  100 (21 Mar 2020 16:00)  178 (21 Mar 2020 11:00)      POCT Blood Glucose.: 100 mg/dL (22 Mar 2020 06:47)  POCT Blood Glucose.: 109 mg/dL (21 Mar 2020 21:34)  POCT Blood Glucose.: 100 mg/dL (21 Mar 2020 16:18)  POCT Blood Glucose.: 178 mg/dL (21 Mar 2020 11:16)    Physical Exam:  General: NAD; A&Ox3  Cardiac: S1/S2, RRR, no murmur, no rubs  Lungs: unlabored respirations, CTA b/l, no wheeze, no rales, no crackles  Abdomen: Soft/NT/ND; positive bowel sounds x 4  Sternum: Intact, no click, incision healing well with no drainage  Incisions: Incisions clean/dry/intact  Extremities: No edema b/l lower extremities; good capillary refill; no cyanosis; palpable 1+ pedal pulses b/l    Central Venous Catheter: Yes[]  access day 3 4  Spencer Catheter: Yes  [] ,to d/c today  EPICARDIAL WIRES:  [] YES [  BOWEL MOVEMENT:  [ [] NO      LABS:                        9.0<L>  5.63  )-----------( 126<L>    ( 22 Mar 2020 01:30 )             26.7<L>                        8.8<L>  5.99  )-----------( 94<L>    ( 21 Mar 2020 01:10 )             25.5<L>    03-22    138  |  103  |  12  ----------------------------<  100<H>  3.7   |  23  |  0.7  03-21    140  |  105  |  10  ----------------------------<  117<H>  3.7   |  22  |  0.8    Ca    8.1<L>      22 Mar 2020 01:30  Mg     2.3     03-22    TPro  5.7<L> [6.0 - 8.0]  /  Alb  4.4 [3.5 - 5.2]  /  TBili  3.0<H> [0.2 - 1.2]  /  DBili  x   /  AST  31 [0 - 41]  /  ALT  32 [0 - 41]  /  AlkPhos  46 [30 - 115]  03-22      ABG - ( 21 Mar 2020 09:43 )  pH: 7.45  /  pCO2: 33    /  pO2: 385   / HCO3: 23    / Base Excess: -1.0  /  SaO2: 100   /  LA: 1.1        RADIOLOGY & ADDITIONAL TESTS:  CXR:  EKG:  MEDICATIONS  (STANDING):  albumin human  5% IVPB 500 milliLiter(s) IV Intermittent once  albuterol/ipratropium for Nebulization 3 milliLiter(s) Nebulizer every 6 hours  aspirin 325 milliGRAM(s) Oral daily  atorvastatin 40 milliGRAM(s) Oral at bedtime  chlorhexidine 4% Liquid 1 Application(s) Topical <User Schedule>  clopidogrel Tablet 75 milliGRAM(s) Oral daily  dextrose 50% Injectable 50 milliLiter(s) IV Push every 15 minutes  dextrose 50% Injectable 25 milliLiter(s) IV Push every 15 minutes  famotidine    Tablet 20 milliGRAM(s) Oral two times a day  guaiFENesin  milliGRAM(s) Oral every 12 hours  heparin  Injectable 5000 Unit(s) SubCutaneous every 12 hours  influenza   Vaccine 0.5 milliLiter(s) IntraMuscular once  insulin glargine Injectable (LANTUS) 30 Unit(s) SubCutaneous every morning  insulin lispro (HumaLOG) corrective regimen sliding scale   SubCutaneous three times a day before meals  insulin lispro Injectable (HumaLOG) 10 Unit(s) SubCutaneous three times a day before meals  isosorbide   mononitrate ER Tablet (IMDUR) 30 milliGRAM(s) Oral daily  magnesium sulfate  IVPB 1 Gram(s) IV Intermittent every 12 hours  melatonin 3 milliGRAM(s) Oral at bedtime  metoprolol tartrate 25 milliGRAM(s) Oral two times a day  sodium chloride 0.9%. 1000 milliLiter(s) (10 mL/Hr) IV Continuous <Continuous>    MEDICATIONS  (PRN):  acetaminophen   Tablet .. 650 milliGRAM(s) Oral every 6 hours PRN Temp greater or equal to 38C (100.4F), Moderate Pain (4 - 6)  glucagon  Injectable 1 milliGRAM(s) IntraMuscular once PRN Glucose LESS THAN 70 milligrams/deciliter  ondansetron  IVPB 4 milliGRAM(s) IV Intermittent once PRN Nausea and/or Vomiting  oxyCODONE    IR 10 milliGRAM(s) Oral every 4 hours PRN Severe Pain (7 - 10)  oxyCODONE    IR 5 milliGRAM(s) Oral every 4 hours PRN Moderate Pain (4 - 6)    Allergies    No Known Allergies    Intolerances      Ambulation/Activity Status:    Assessment/Plan:  59y Male status-post CABG with  CVA ruled out by CT and CTA of head and neck.  - Case and plan discussed with CTU Intensivist and CT Surgeon - Dr. Yadav/Gorge   - Continue CTU supportive care    - Continue DVT/GI prophylaxis  - Incentive Spirometry 10 times an hour  - Continue to advance physical activity as tolerated and continue PT/OT as directed  - CAD: Continue ASA, statin, Plavix  - decreease metoprolol to 12.5 mg BID to maintain -180  - continue Isordil 5 mg q8h  - increase Lantus 30, start prandial insulin 10 units with meals and stop insulin drip  - maintain diet  - repeat LFt's trending down  - will discontinue akbar OPERATIVE PROCEDURE(s):                POD #   4 CABG with radial                    59yMale  SURGEON(s): PRECIOUS Yadav  SUBJECTIVE ASSESSMENT: doing better  Vital Signs Last 24 Hrs  T(F): 99.9 (22 Mar 2020 04:00), Max: 100.4 (21 Mar 2020 17:00)  HR: 82 (22 Mar 2020 07:00) (82 - 100)  BP: 143/79 (21 Mar 2020 20:16) (137/84 - 143/79)  BP(mean): 104 (21 Mar 2020 20:16) (104 - 105)  ABP: 150/73 (22 Mar 2020 07:00) (124/62 - 158/79)  ABP(mean): 101 (22 Mar 2020 07:00)  RR: 20 (22 Mar 2020 07:00) (17 - 31)  SpO2: 100% (22 Mar 2020 07:00) (94% - 100%)    I&O's Detail    21 Mar 2020 07:01  -  22 Mar 2020 07:00  --------------------------------------------------------  IN:    IV PiggyBack: 200 mL    Oral Fluid: 1421 mL    sodium chloride 0.9%.: 200 mL  Total IN: 1821 mL    OUT:    Indwelling Catheter - Urethral: 1055 mL    Voided: 1288 mL  Total OUT: 2343 mL    Net:   I&O's Detail    20 Mar 2020 07:01  -  21 Mar 2020 07:00  --------------------------------------------------------  Total NET: -431 mL      21 Mar 2020 07:01  -  22 Mar 2020 07:00  --------------------------------------------------------  Total NET: -522 mL    CAPILLARY BLOOD GLUCOSE  100 (21 Mar 2020 16:00)  178 (21 Mar 2020 11:00)  POCT Blood Glucose.: 100 mg/dL (22 Mar 2020 06:47)  POCT Blood Glucose.: 109 mg/dL (21 Mar 2020 21:34)  POCT Blood Glucose.: 100 mg/dL (21 Mar 2020 16:18)  POCT Blood Glucose.: 178 mg/dL (21 Mar 2020 11:16)    Physical Exam:  General: NAD; A&Ox3  Cardiac: S1/S2, RRR, no murmur, no rubs  Lungs: unlabored respirations, CTA b/l, no wheeze, no rales, no crackles  Abdomen: Soft/NT/ND; positive bowel sounds x 4  Sternum: Intact, no click, incision healing well with no drainage  Incisions: Incisions clean/dry/intact  Extremities: No edema b/l lower extremities; good capillary refill; no cyanosis; palpable 1+ pedal pulses b/l    Central Venous Catheter: Yes[]  access day 3 4  Spencer Catheter: Yes  [] ,to d/c today      LABS:                        9.0<L>  5.63  )-----------( 126<L>    ( 22 Mar 2020 01:30 )             26.7<L>                        8.8<L>  5.99  )-----------( 94<L>    ( 21 Mar 2020 01:10 )             25.5<L>    03-22    138  |  103  |  12  ----------------------------<  100<H>  3.7   |  23  |  0.7  03-21    140  |  105  |  10  ----------------------------<  117<H>  3.7   |  22  |  0.8    Ca    8.1<L>      22 Mar 2020 01:30  Mg     2.3     03-22    TPro  5.7<L> [6.0 - 8.0]  /  Alb  4.4 [3.5 - 5.2]  /  TBili  3.0<H> [0.2 - 1.2]  /  DBili  x   /  AST  31 [0 - 41]  /  ALT  32 [0 - 41]  /  AlkPhos  46 [30 - 115]  03-22      ABG - ( 21 Mar 2020 09:43 )  pH: 7.45  /  pCO2: 33    /  pO2: 385   / HCO3: 23    / Base Excess: -1.0  /  SaO2: 100   /  LA: 1.1      MEDICATIONS  (STANDING):  albumin human  5% IVPB 500 milliLiter(s) IV Intermittent once  albuterol/ipratropium for Nebulization 3 milliLiter(s) Nebulizer every 6 hours  aspirin 325 milliGRAM(s) Oral daily  atorvastatin 40 milliGRAM(s) Oral at bedtime  clopidogrel Tablet 75 milliGRAM(s) Oral daily  famotidine    Tablet 20 milliGRAM(s) Oral two times a day  guaiFENesin  milliGRAM(s) Oral every 12 hours  heparin  Injectable 5000 Unit(s) SubCutaneous every 12 hours  influenza   Vaccine 0.5 milliLiter(s) IntraMuscular once  insulin glargine Injectable (LANTUS) 30 Unit(s) SubCutaneous every morning  insulin lispro (HumaLOG) corrective regimen sliding scale   SubCutaneous three times a day before meals  insulin lispro Injectable (HumaLOG) 10 Unit(s) SubCutaneous three times a day before meals  isosorbide   mononitrate ER Tablet (IMDUR) 30 milliGRAM(s) Oral daily  magnesium sulfate  IVPB 1 Gram(s) IV Intermittent every 12 hours  melatonin 3 milliGRAM(s) Oral at bedtime  metoprolol tartrate 25 milliGRAM(s) Oral two times a day    MEDICATIONS  (PRN):  acetaminophen   Tablet .. 650 milliGRAM(s) Oral every 6 hours PRN Temp greater or equal to 38C (100.4F), Moderate Pain (4 - 6)  glucagon  Injectable 1 milliGRAM(s) IntraMuscular once PRN Glucose LESS THAN 70 milligrams/deciliter  ondansetron  IVPB 4 milliGRAM(s) IV Intermittent once PRN Nausea and/or Vomiting  oxyCODONE    IR 10 milliGRAM(s) Oral every 4 hours PRN Severe Pain (7 - 10)  oxyCODONE    IR 5 milliGRAM(s) Oral every 4 hours PRN Moderate Pain (4 - 6)    Allergies  No Known Allergies    Assessment/Plan:  59y Male status-post CABG with  CVA ruled out by CT and CTA of head and neck.  - Case and plan discussed with CTU Intensivist and CT Surgeon - Dr. Yadav/Gorge   - Continue CTU supportive care    - Continue DVT/GI prophylaxis  - Incentive Spirometry 10 times an hour  - Continue to advance physical activity as tolerated and continue PT/OT as directed  - CAD: Continue ASA, statin, Plavix  - decreease metoprolol to 12.5 mg BID to maintain -180  - continue Isordil 5 mg q8h  - increase Lantus 30, start prandial insulin 10 units with meals and stop insulin drip  - maintain diet  - repeat LFt's trending down  - will discontinue akbar OPERATIVE PROCEDURE(s):                POD #   4 CABG with radial                    59yMale  SURGEON(s): PRECIOUS Yadav  SUBJECTIVE ASSESSMENT: doing better, some abdominal fullnes  Vital Signs Last 24 Hrs  T(F): 99.9 (22 Mar 2020 04:00), Max: 100.4 (21 Mar 2020 17:00)  HR: 82 (22 Mar 2020 07:00) (82 - 100)  BP: 143/79 (21 Mar 2020 20:16) (137/84 - 143/79)  BP(mean): 104 (21 Mar 2020 20:16) (104 - 105)  ABP: 150/73 (22 Mar 2020 07:00) (124/62 - 158/79)  ABP(mean): 101 (22 Mar 2020 07:00)  RR: 20 (22 Mar 2020 07:00) (17 - 31)  SpO2: 100% (22 Mar 2020 07:00) (94% - 100%)    I&O's Detail    21 Mar 2020 07:01  -  22 Mar 2020 07:00  --------------------------------------------------------  IN:    IV PiggyBack: 200 mL    Oral Fluid: 1421 mL    sodium chloride 0.9%.: 200 mL  Total IN: 1821 mL    OUT:    Indwelling Catheter - Urethral: 1055 mL    Voided: 1288 mL  Total OUT: 2343 mL    Net:   I&O's Detail    20 Mar 2020 07:01  -  21 Mar 2020 07:00  --------------------------------------------------------  Total NET: -431 mL      21 Mar 2020 07:01  -  22 Mar 2020 07:00  --------------------------------------------------------  Total NET: -522 mL    CAPILLARY BLOOD GLUCOSE  100 (21 Mar 2020 16:00)  178 (21 Mar 2020 11:00)  POCT Blood Glucose.: 100 mg/dL (22 Mar 2020 06:47)  POCT Blood Glucose.: 109 mg/dL (21 Mar 2020 21:34)  POCT Blood Glucose.: 100 mg/dL (21 Mar 2020 16:18)  POCT Blood Glucose.: 178 mg/dL (21 Mar 2020 11:16)    Physical Exam:  General: NAD; A&Ox3  Cardiac: S1/S2, RRR, no murmur, no rubs  Lungs: unlabored respirations, CTA b/l, no wheeze, no rales, no crackles  Abdomen: Soft/NT/ protuberant; positive bowel sounds x 4  Sternum: Intact, no click, incision healing well with no drainage  Incisions: Incisions clean/dry/intact  Extremities: No edema b/l lower extremities; good capillary refill; no cyanosis; palpable 1+ pedal pulses b/l    Central Venous Catheter: Yes[]  access day 4  Webber Catheter: Yes  [] ,to d/c today      LABS:                        9.0<L>  5.63  )-----------( 126<L>    ( 22 Mar 2020 01:30 )             26.7<L>                        8.8<L>  5.99  )-----------( 94<L>    ( 21 Mar 2020 01:10 )             25.5<L>    03-22    138  |  103  |  12  ----------------------------<  100<H>  3.7   |  23  |  0.7  03-21    140  |  105  |  10  ----------------------------<  117<H>  3.7   |  22  |  0.8    Ca    8.1<L>      22 Mar 2020 01:30  Mg     2.3     03-22    TPro  5.7<L> [6.0 - 8.0]  /  Alb  4.4 [3.5 - 5.2]  /  TBili  3.0<H> [0.2 - 1.2]  /  DBili  x   /  AST  31 [0 - 41]  /  ALT  32 [0 - 41]  /  AlkPhos  46 [30 - 115]  03-22      ABG - ( 21 Mar 2020 09:43 )  pH: 7.45  /  pCO2: 33    /  pO2: 385   / HCO3: 23    / Base Excess: -1.0  /  SaO2: 100   /  LA: 1.1      < from: Xray Kidney Ureter Bladder (03.22.20 @ 12:38) >  INTERPRETATION:  Clinical History / Reason for exam: Distended abdomen  Single image  Comparison 3/21/2009  The bowel gas pattern is normal.  No abnormal calcification or free air is seen.  Impression  Normal bowel gas pattern    < from: 12 Lead ECG (03.22.20 @ 08:05) >  Diagnosis Line Normal sinus rhythm  Incomplete right bundle branch block  Possible Anterior infarct , age undetermined  Abnormal ECG    < from: Xray Chest 1 View- PORTABLE-Routine (03.22.20 @ 05:39) >  Impression:      Stable bilateral effusions and interstitial edema.    < end of copied text >      MEDICATIONS  (STANDING):  albumin human  5% IVPB 500 milliLiter(s) IV Intermittent once  albuterol/ipratropium for Nebulization 3 milliLiter(s) Nebulizer every 6 hours  aspirin 325 milliGRAM(s) Oral daily  atorvastatin 40 milliGRAM(s) Oral at bedtime  clopidogrel Tablet 75 milliGRAM(s) Oral daily  famotidine    Tablet 20 milliGRAM(s) Oral two times a day  guaiFENesin  milliGRAM(s) Oral every 12 hours  heparin  Injectable 5000 Unit(s) SubCutaneous every 12 hours  influenza   Vaccine 0.5 milliLiter(s) IntraMuscular once  insulin glargine Injectable (LANTUS) 30 Unit(s) SubCutaneous every morning  insulin lispro (HumaLOG) corrective regimen sliding scale   SubCutaneous three times a day before meals  insulin lispro Injectable (HumaLOG) 10 Unit(s) SubCutaneous three times a day before meals  isosorbide   mononitrate ER Tablet (IMDUR) 30 milliGRAM(s) Oral daily  magnesium sulfate  IVPB 1 Gram(s) IV Intermittent every 12 hours  melatonin 3 milliGRAM(s) Oral at bedtime  metoprolol tartrate 25 milliGRAM(s) Oral two times a day    MEDICATIONS  (PRN):  acetaminophen   Tablet .. 650 milliGRAM(s) Oral every 6 hours PRN Temp greater or equal to 38C (100.4F), Moderate Pain (4 - 6)  glucagon  Injectable 1 milliGRAM(s) IntraMuscular once PRN Glucose LESS THAN 70 milligrams/deciliter  ondansetron  IVPB 4 milliGRAM(s) IV Intermittent once PRN Nausea and/or Vomiting  oxyCODONE    IR 10 milliGRAM(s) Oral every 4 hours PRN Severe Pain (7 - 10)  oxyCODONE    IR 5 milliGRAM(s) Oral every 4 hours PRN Moderate Pain (4 - 6)    Allergies  No Known Allergies    Assessment/Plan:  59y Male status-post CABG with  CVA ruled out by CT and CTA of head and neck.  - Case and plan discussed with CTU Intensivist and CT Surgeon - Dr. Yadav/Gorge   - Continue CTU supportive care    - Continue DVT/GI prophylaxis  - Incentive Spirometry 10 times an hour  - Continue to advance physical activity as tolerated and continue PT/OT as directed  - CAD: Continue ASA, statin, Plavix  - decrease metoprolol to 12.5 mg BID to maintain -180  -  Isordil changed to IMDU  - continue  Lantus 30, prandial insulin 10 units with meals and sliding scale   - maintain diet  - repeat LFt's trending down  - will discontinue webber  and arterial line  - attending plan to remove central line tomorrow if all is well  - Toradol for pain  - Zaroxolyn x1 with lasix and K  for diuresis due to pulmonary congestion  - remove arterial line and webber Principal Discharge DX:	Lumbar radiculopathy, acute

## 2020-03-23 ENCOUNTER — TRANSCRIPTION ENCOUNTER (OUTPATIENT)
Age: 60
End: 2020-03-23

## 2020-03-23 VITALS — HEART RATE: 108 BPM | SYSTOLIC BLOOD PRESSURE: 120 MMHG | DIASTOLIC BLOOD PRESSURE: 68 MMHG | OXYGEN SATURATION: 95 %

## 2020-03-23 PROBLEM — I25.10 ATHEROSCLEROTIC HEART DISEASE OF NATIVE CORONARY ARTERY WITHOUT ANGINA PECTORIS: Chronic | Status: ACTIVE | Noted: 2020-03-16

## 2020-03-23 PROBLEM — E11.9 TYPE 2 DIABETES MELLITUS WITHOUT COMPLICATIONS: Chronic | Status: ACTIVE | Noted: 2020-03-16

## 2020-03-23 PROBLEM — Z86.79 PERSONAL HISTORY OF OTHER DISEASES OF THE CIRCULATORY SYSTEM: Chronic | Status: ACTIVE | Noted: 2020-03-16

## 2020-03-23 PROBLEM — Z86.39 PERSONAL HISTORY OF OTHER ENDOCRINE, NUTRITIONAL AND METABOLIC DISEASE: Chronic | Status: ACTIVE | Noted: 2020-03-16

## 2020-03-23 LAB
GLUCOSE BLDC GLUCOMTR-MCNC: 110 MG/DL — HIGH (ref 70–99)
GLUCOSE BLDC GLUCOMTR-MCNC: 299 MG/DL — HIGH (ref 70–99)
GLUCOSE BLDC GLUCOMTR-MCNC: 89 MG/DL — SIGNIFICANT CHANGE UP (ref 70–99)

## 2020-03-23 PROCEDURE — 99232 SBSQ HOSP IP/OBS MODERATE 35: CPT

## 2020-03-23 PROCEDURE — 93010 ELECTROCARDIOGRAM REPORT: CPT

## 2020-03-23 PROCEDURE — 99233 SBSQ HOSP IP/OBS HIGH 50: CPT

## 2020-03-23 RX ORDER — FUROSEMIDE 40 MG
1 TABLET ORAL
Qty: 3 | Refills: 0
Start: 2020-03-23 | End: 2020-03-25

## 2020-03-23 RX ORDER — METOPROLOL TARTRATE 50 MG
1 TABLET ORAL
Qty: 60 | Refills: 0
Start: 2020-03-23

## 2020-03-23 RX ORDER — ATORVASTATIN CALCIUM 80 MG/1
1 TABLET, FILM COATED ORAL
Qty: 30 | Refills: 0
Start: 2020-03-23

## 2020-03-23 RX ORDER — INSULIN ASPART 100 [IU]/ML
10 INJECTION, SOLUTION SUBCUTANEOUS
Qty: 3 | Refills: 0
Start: 2020-03-23 | End: 2020-04-21

## 2020-03-23 RX ORDER — KETOROLAC TROMETHAMINE 30 MG/ML
30 SYRINGE (ML) INJECTION ONCE
Refills: 0 | Status: DISCONTINUED | OUTPATIENT
Start: 2020-03-23 | End: 2020-03-23

## 2020-03-23 RX ORDER — FUROSEMIDE 40 MG
40 TABLET ORAL ONCE
Refills: 0 | Status: COMPLETED | OUTPATIENT
Start: 2020-03-23 | End: 2020-03-23

## 2020-03-23 RX ORDER — CLOPIDOGREL BISULFATE 75 MG/1
1 TABLET, FILM COATED ORAL
Qty: 0 | Refills: 0 | DISCHARGE

## 2020-03-23 RX ORDER — ENOXAPARIN SODIUM 100 MG/ML
30 INJECTION SUBCUTANEOUS
Qty: 3 | Refills: 0
Start: 2020-03-23 | End: 2020-04-21

## 2020-03-23 RX ORDER — ATORVASTATIN CALCIUM 80 MG/1
1 TABLET, FILM COATED ORAL
Qty: 0 | Refills: 0 | DISCHARGE

## 2020-03-23 RX ORDER — ACETAMINOPHEN 500 MG
2 TABLET ORAL
Qty: 0 | Refills: 0 | DISCHARGE
Start: 2020-03-23

## 2020-03-23 RX ORDER — HEPARIN SODIUM 5000 [USP'U]/ML
5000 INJECTION INTRAVENOUS; SUBCUTANEOUS EVERY 8 HOURS
Refills: 0 | Status: DISCONTINUED | OUTPATIENT
Start: 2020-03-23 | End: 2020-03-23

## 2020-03-23 RX ORDER — ISOSORBIDE MONONITRATE 60 MG/1
1 TABLET, EXTENDED RELEASE ORAL
Qty: 30 | Refills: 0
Start: 2020-03-23

## 2020-03-23 RX ORDER — FAMOTIDINE 10 MG/ML
1 INJECTION INTRAVENOUS
Qty: 60 | Refills: 0
Start: 2020-03-23

## 2020-03-23 RX ORDER — ISOPROPYL ALCOHOL, BENZOCAINE .7; .06 ML/ML; ML/ML
1 SWAB TOPICAL
Qty: 100 | Refills: 1
Start: 2020-03-23 | End: 2020-05-11

## 2020-03-23 RX ORDER — CLOPIDOGREL BISULFATE 75 MG/1
1 TABLET, FILM COATED ORAL
Qty: 30 | Refills: 0
Start: 2020-03-23

## 2020-03-23 RX ORDER — ASPIRIN/CALCIUM CARB/MAGNESIUM 324 MG
1 TABLET ORAL
Qty: 0 | Refills: 0 | DISCHARGE

## 2020-03-23 RX ORDER — POTASSIUM CHLORIDE 20 MEQ
1 PACKET (EA) ORAL
Qty: 3 | Refills: 0
Start: 2020-03-23 | End: 2020-03-25

## 2020-03-23 RX ORDER — ASPIRIN/CALCIUM CARB/MAGNESIUM 324 MG
1 TABLET ORAL
Qty: 90 | Refills: 0
Start: 2020-03-23

## 2020-03-23 RX ORDER — METOPROLOL TARTRATE 50 MG
1 TABLET ORAL
Qty: 0 | Refills: 0 | DISCHARGE

## 2020-03-23 RX ORDER — TAMSULOSIN HYDROCHLORIDE 0.4 MG/1
1 CAPSULE ORAL
Qty: 30 | Refills: 0
Start: 2020-03-23

## 2020-03-23 RX ORDER — LANOLIN ALCOHOL/MO/W.PET/CERES
1 CREAM (GRAM) TOPICAL
Qty: 30 | Refills: 0
Start: 2020-03-23

## 2020-03-23 RX ADMIN — INFLUENZA VIRUS VACCINE 0.5 MILLILITER(S): 15; 15; 15; 15 SUSPENSION INTRAMUSCULAR at 16:28

## 2020-03-23 RX ADMIN — Medication 3 MILLILITER(S): at 16:46

## 2020-03-23 RX ADMIN — Medication 3 MILLILITER(S): at 08:46

## 2020-03-23 RX ADMIN — OXYCODONE HYDROCHLORIDE 10 MILLIGRAM(S): 5 TABLET ORAL at 09:30

## 2020-03-23 RX ADMIN — FAMOTIDINE 20 MILLIGRAM(S): 10 INJECTION INTRAVENOUS at 18:17

## 2020-03-23 RX ADMIN — Medication 30 MILLIGRAM(S): at 01:03

## 2020-03-23 RX ADMIN — OXYCODONE HYDROCHLORIDE 10 MILLIGRAM(S): 5 TABLET ORAL at 10:00

## 2020-03-23 RX ADMIN — OXYCODONE HYDROCHLORIDE 10 MILLIGRAM(S): 5 TABLET ORAL at 16:56

## 2020-03-23 RX ADMIN — FAMOTIDINE 20 MILLIGRAM(S): 10 INJECTION INTRAVENOUS at 05:34

## 2020-03-23 RX ADMIN — Medication 25 MILLIGRAM(S): at 05:34

## 2020-03-23 RX ADMIN — Medication 600 MILLIGRAM(S): at 18:17

## 2020-03-23 RX ADMIN — Medication 325 MILLIGRAM(S): at 12:39

## 2020-03-23 RX ADMIN — OXYCODONE HYDROCHLORIDE 10 MILLIGRAM(S): 5 TABLET ORAL at 16:26

## 2020-03-23 RX ADMIN — Medication 40 MILLIGRAM(S): at 10:04

## 2020-03-23 RX ADMIN — Medication 600 MILLIGRAM(S): at 05:34

## 2020-03-23 RX ADMIN — Medication 100 GRAM(S): at 05:34

## 2020-03-23 RX ADMIN — Medication 6: at 12:15

## 2020-03-23 RX ADMIN — OXYCODONE HYDROCHLORIDE 10 MILLIGRAM(S): 5 TABLET ORAL at 06:17

## 2020-03-23 RX ADMIN — ISOSORBIDE MONONITRATE 30 MILLIGRAM(S): 60 TABLET, EXTENDED RELEASE ORAL at 12:45

## 2020-03-23 RX ADMIN — HEPARIN SODIUM 5000 UNIT(S): 5000 INJECTION INTRAVENOUS; SUBCUTANEOUS at 14:23

## 2020-03-23 RX ADMIN — Medication 10 UNIT(S): at 12:15

## 2020-03-23 RX ADMIN — OXYCODONE HYDROCHLORIDE 10 MILLIGRAM(S): 5 TABLET ORAL at 05:36

## 2020-03-23 RX ADMIN — TEMAZEPAM 15 MILLIGRAM(S): 15 CAPSULE ORAL at 01:02

## 2020-03-23 RX ADMIN — Medication 25 MILLIGRAM(S): at 18:17

## 2020-03-23 RX ADMIN — Medication 10 UNIT(S): at 07:45

## 2020-03-23 RX ADMIN — INSULIN GLARGINE 30 UNIT(S): 100 INJECTION, SOLUTION SUBCUTANEOUS at 07:45

## 2020-03-23 RX ADMIN — Medication 30 MILLIGRAM(S): at 02:00

## 2020-03-23 RX ADMIN — CLOPIDOGREL BISULFATE 75 MILLIGRAM(S): 75 TABLET, FILM COATED ORAL at 12:39

## 2020-03-23 RX ADMIN — CHLORHEXIDINE GLUCONATE 1 APPLICATION(S): 213 SOLUTION TOPICAL at 06:17

## 2020-03-23 NOTE — PROGRESS NOTE ADULT - ASSESSMENT
58 yo M w/ h/o CAD, DLD, HTN s/p CABG w/ acute RHH s/o acute L occipital stroke clinically    Recommendations:  - continue secondary stroke prevention  - MRI Brain as previously recommended- if unable due to recent CABG, consider repeat HCT  - continue secondary stroke prevention for now  - call w/ questions  - continue dual antiplts  - if d/c'ed, f/u in stroke clinic in 2 weeks

## 2020-03-23 NOTE — DIETITIAN INITIAL EVALUATION ADULT. - DIET TYPE
Pt. reports good appetite, regular diet PTP, no supplement use. NKFA. Stable weight trends. No cultural/[employer] dietary restr. Likes all kinds of cuisines.

## 2020-03-23 NOTE — DISCHARGE NOTE NURSING/CASE MANAGEMENT/SOCIAL WORK - NSDCPEWEB_GEN_ALL_CORE
[facility] for Tobacco Control website --- [email or web address] --- [email or web address]

## 2020-03-23 NOTE — PROGRESS NOTE ADULT - SUBJECTIVE AND OBJECTIVE BOX
OPERATIVE PROCEDURE(s):  CABG x 3 (Radial)              POD # 5                      59yMale  SURGEON(s): PRECIOUS Yadav  SUBJECTIVE ASSESSMENT:  Patient has no complaints at this time.    Vital Signs Last 24 Hrs  T(F): 97.3 (23 Mar 2020 08:00), Max: 100.1 (22 Mar 2020 12:00)  HR: 86 (23 Mar 2020 08:00) (81 - 102)  BP: 141/87 (23 Mar 2020 08:00) (109/65 - 155/84)  BP(mean): 108 (23 Mar 2020 08:00) (82 - 110)  ABP: 161/72 (22 Mar 2020 11:00) (161/72 - 161/72)  ABP(mean): 104 (22 Mar 2020 11:00)  RR: 25 (22 Mar 2020 21:00) (19 - 30)  SpO2: 97% (23 Mar 2020 08:00) (94% - 100%) RA      I&O's Detail    22 Mar 2020 07:01  -  23 Mar 2020 07:00  --------------------------------------------------------  IN:    IV PiggyBack: 300 mL    Oral Fluid: 1500 mL  Total IN: 1800 mL    OUT:    Indwelling Catheter - Urethral: 3630 mL    Voided: 750 mL  Total OUT: 4380 mL        Net:   I&O's Detail    21 Mar 2020 07:  -  22 Mar 2020 07:00  --------------------------------------------------------  Total NET: -522 mL      22 Mar 2020 07:01  -  23 Mar 2020 07:00  --------------------------------------------------------  Total NET: -2580 mL        CAPILLARY BLOOD GLUCOSE  165 (22 Mar 2020 11:00)      POCT Blood Glucose.: 110 mg/dL (23 Mar 2020 06:50)  POCT Blood Glucose.: 159 mg/dL (22 Mar 2020 22:14)  POCT Blood Glucose.: 112 mg/dL (22 Mar 2020 16:40)  POCT Blood Glucose.: 165 mg/dL (22 Mar 2020 11:20)    Central Venous Catheter: Yes[x]  No[] , If Yes indication: remove today          Day #5  Spencer Catheter: Yes  [] , No  [x] , If yes indication:                      Day #  NGT: Yes [] No [x] ,    If Yes Placement:                                     Day #  EPICARDIAL WIRES:  [] YES [x] NO                                              Day #  BOWEL MOVEMENT:  [x] YES [] NO, If No, Timing since last BM:      Day #  CHEST TUBE(Left/Right):  [] YES [x] NO, If yes -  AIR LEAKS:  [] YES [] NO        LABS:                        9.7<L>  6.19  )-----------( 174      ( 22 Mar 2020 22:20 )             28.2<L>                        9.0<L>  5.63  )-----------( 126<L>    ( 22 Mar 2020 01:30 )             26.7<L>    0322    139  |  102  |  17  ----------------------------<  161<H>  4.4   |  26  |  0.9      140  |  101  |  13  ----------------------------<  118<H>  3.6   |  26  |  0.7    Ca    8.8      22 Mar 2020 22:20  Mg     2.4         TPro  6.1 [6.0 - 8.0]  /  Alb  4.4 [3.5 - 5.2]  /  TBili  2.3<H> [0.2 - 1.2]  /  DBili  x   /  AST  38 [0 - 41]  /  ALT  35 [0 - 41]  /  AlkPhos  55 [30 - 115]            RADIOLOGY & ADDITIONAL TESTS:  EK Lead ECG:   Ventricular Rate 87 BPM    Atrial Rate 87 BPM    P-R Interval 124 ms    QRS Duration 100 ms    Q-T Interval 346 ms    QTC Calculation(Bezet) 416 ms    P Axis 28 degrees    R Axis 45 degrees    T Axis 39 degrees    Diagnosis Line Normal sinus rhythm  Incomplete right bundle branch block  Nonspecific T wave abnormality  Abnormal ECG    Confirmed by Sushila Garcia MD ([id number]) on 3/23/2020 9:16:27 AM (20 @ 07:55)    MEDICATIONS  (STANDING):  albuterol/ipratropium for Nebulization 3 milliLiter(s) Nebulizer every 6 hours  aspirin 325 milliGRAM(s) Oral daily  atorvastatin 40 milliGRAM(s) Oral at bedtime  chlorhexidine 4% Liquid 1 Application(s) Topical <User Schedule>  clopidogrel Tablet 75 milliGRAM(s) Oral daily  dextrose 50% Injectable 50 milliLiter(s) IV Push every 15 minutes  dextrose 50% Injectable 25 milliLiter(s) IV Push every 15 minutes  famotidine    Tablet 20 milliGRAM(s) Oral two times a day  guaiFENesin  milliGRAM(s) Oral every 12 hours  heparin  Injectable 5000 Unit(s) SubCutaneous every 8 hours  influenza   Vaccine 0.5 milliLiter(s) IntraMuscular once  insulin glargine Injectable (LANTUS) 30 Unit(s) SubCutaneous every morning  insulin lispro (HumaLOG) corrective regimen sliding scale   SubCutaneous three times a day before meals  insulin lispro Injectable (HumaLOG) 10 Unit(s) SubCutaneous three times a day before meals  isosorbide   mononitrate ER Tablet (IMDUR) 30 milliGRAM(s) Oral daily  magnesium sulfate  IVPB 1 Gram(s) IV Intermittent every 12 hours  melatonin 3 milliGRAM(s) Oral at bedtime  metoprolol tartrate 25 milliGRAM(s) Oral two times a day  sodium chloride 0.9%. 1000 milliLiter(s) (10 mL/Hr) IV Continuous <Continuous>  tamsulosin 0.4 milliGRAM(s) Oral at bedtime    MEDICATIONS  (PRN):  acetaminophen   Tablet .. 650 milliGRAM(s) Oral every 6 hours PRN Temp greater or equal to 38C (100.4F), Moderate Pain (4 - 6)  glucagon  Injectable 1 milliGRAM(s) IntraMuscular once PRN Glucose LESS THAN 70 milligrams/deciliter  oxyCODONE    IR 10 milliGRAM(s) Oral every 4 hours PRN Severe Pain (7 - 10)  oxyCODONE    IR 5 milliGRAM(s) Oral every 4 hours PRN Moderate Pain (4 - 6)  temazepam 15 milliGRAM(s) Oral at bedtime PRN Insomnia    HEPARIN:  [x] YES [] NO  Dose: 5000 UNITS Q8H  SCD's: YES b/l  GI Prophylaxis: Protonix [], Pepcid [x], None [], (Contra-indication:.....)    Post-Op Aspirin: Yes [x],  No [], If No, then contra-indication:  Post-Op Statin: Yes [x], No[], If No, then contra-indication:  Post-Op Beta-Blockers: Yes [x], No [], If No, then contra-indication:    Allergies:  No Known Allergies      Ambulation/Activity Status: Ambulates several times daily without assistance.    Assessment/Plan:  59y Male status-post CABG x 3 (radial)  - Case and plan discussed with CTU Intensivist and CT Surgeon - Dr. Yadav/Keyur/Gorge   - Continue CTU supportive care    - Continue DVT/GI prophylaxis  - Incentive Spirometry 10 times an hour  - Continue to advance physical activity as tolerated and continue PT/OT as directed  1. CAD: Continue ASA, statin, BB, and Imdur   2. DM/Glucose Control: Continue Lantus and Humolog 30/10  3. Lasix 40mg QD PO x3 days + KCL 20 meq QD PO x 2 days  4. F/U with Neurology as outpatient  5. D/C home and follow-up with Dr. Yadav on Friday in the outpatient clinic    Social Service Disposition:  Home today with Home Care

## 2020-03-23 NOTE — DIETITIAN INITIAL EVALUATION ADULT. - OTHER INFO
P/w: post CABG with  CVA ruled out by CT and CTA of head and neck. POD #4. OOB ambulate, off insulin gtt, on long acting and short acting insulin, FS< 200. Pt. newly diagnosed with DM. Provided education + hand outs.

## 2020-03-23 NOTE — CHART NOTE - NSCHARTNOTEFT_GEN_A_CORE
had a good weekend . ambulated multiple times yesterday with no difficulty. SOB better. did not sleep last pm. O/e in NSR w /70. few rhonchi. neuro no defecits. CT head and CT perfusion scan w no evidence of stroke. Clinically no evidence of stroke. will dc home today.

## 2020-03-23 NOTE — DIETITIAN INITIAL EVALUATION ADULT. - RD TO REMAIN AVAILABLE
yes/RD to monitor energy intake, body composition, NFPF, glucose profile. In 7 days pt. to continue to consume % PO at meals

## 2020-03-23 NOTE — PROGRESS NOTE ADULT - SUBJECTIVE AND OBJECTIVE BOX
Neurology Progress Note    Interval History:  Pt still has persistent blurriness in the R visual field.  Some improvement since last neurologic evaluation but persistent.  No new focal deficits.  c/o some lightheadedness and imbalance when walking.  Denies any diplopia or incoordination with arms.      HPI:  59 yrs old with Hx of CAD, D , DL, HTN is here for elective LHC. Patient c/o progressive chest pain on exertion.  Pt had abnormal stress test. (16 Mar 2020 10:28)    PAST MEDICAL & SURGICAL HISTORY:  CAD (coronary artery disease): s/p PTCA WITH 2 STENTS  H/O hyperlipidemia  H/O: HTN (hypertension)  DM2 (diabetes mellitus, type 2)  History of tonsillectomy  H/O knee surgery    Medications:  acetaminophen   Tablet .. 650 milliGRAM(s) Oral every 6 hours PRN  albuterol/ipratropium for Nebulization 3 milliLiter(s) Nebulizer every 6 hours  aspirin 325 milliGRAM(s) Oral daily  atorvastatin 40 milliGRAM(s) Oral at bedtime  chlorhexidine 4% Liquid 1 Application(s) Topical <User Schedule>  clopidogrel Tablet 75 milliGRAM(s) Oral daily  dextrose 50% Injectable 50 milliLiter(s) IV Push every 15 minutes  dextrose 50% Injectable 25 milliLiter(s) IV Push every 15 minutes  famotidine    Tablet 20 milliGRAM(s) Oral two times a day  glucagon  Injectable 1 milliGRAM(s) IntraMuscular once PRN  guaiFENesin  milliGRAM(s) Oral every 12 hours  heparin  Injectable 5000 Unit(s) SubCutaneous every 8 hours  insulin glargine Injectable (LANTUS) 30 Unit(s) SubCutaneous every morning  insulin lispro (HumaLOG) corrective regimen sliding scale   SubCutaneous three times a day before meals  insulin lispro Injectable (HumaLOG) 10 Unit(s) SubCutaneous three times a day before meals  isosorbide   mononitrate ER Tablet (IMDUR) 30 milliGRAM(s) Oral daily  magnesium sulfate  IVPB 1 Gram(s) IV Intermittent every 12 hours  melatonin 3 milliGRAM(s) Oral at bedtime  metoprolol tartrate 25 milliGRAM(s) Oral two times a day  oxyCODONE    IR 10 milliGRAM(s) Oral every 4 hours PRN  oxyCODONE    IR 5 milliGRAM(s) Oral every 4 hours PRN  sodium chloride 0.9%. 1000 milliLiter(s) IV Continuous <Continuous>  tamsulosin 0.4 milliGRAM(s) Oral at bedtime  temazepam 15 milliGRAM(s) Oral at bedtime PRN      Vital Signs Last 24 Hrs  T(C): 36.3 (23 Mar 2020 08:00), Max: 37.2 (23 Mar 2020 00:00)  T(F): 97.3 (23 Mar 2020 08:00), Max: 98.9 (23 Mar 2020 00:00)  HR: 98 (23 Mar 2020 13:15) (86 - 102)  BP: 129/77 (23 Mar 2020 13:15) (109/65 - 155/84)  BP(mean): 97 (23 Mar 2020 13:15) (82 - 110)  RR: 25 (22 Mar 2020 21:00) (20 - 27)  SpO2: 97% (23 Mar 2020 13:15) (95% - 99%)    Neurological Exam:   Mental status: Awake, alert and oriented x3.  Recent and remote memory intact.  Naming, repetition and comprehension intact.  Attention/concentration intact.  No dysarthria, no aphasia.  Fund of knowledge appropriate.    Cranial nerves: Pupils equally round and reactive to light, visual fields full, no nystagmus, extraocular muscles intact, V1 through V3 intact bilaterally and symmetric, face symmetric, hearing intact to finger rub, palate elevation symmetric, tongue was midline.  Motor:  MRC grading 5/5 b/l UE/LE.   strength 5/5.  Normal tone and bulk.  No abnormal movements.    Sensation: Intact to light touch, proprioception, and pinprick.   Coordination: No dysmetria on finger-to-nose and heel-to-shin.  No dysdiadokinesia.  Reflexes: 2+ in bilateral UE/LE, downgoing toes bilaterally. (-) Hopkins.  Gait: Narrow and steady. No ataxia.  Romberg negative    Labs:  CBC Full  -  ( 22 Mar 2020 22:20 )  WBC Count : 6.19 K/uL  RBC Count : 3.13 M/uL  Hemoglobin : 9.7 g/dL  Hematocrit : 28.2 %  Platelet Count - Automated : 174 K/uL  Mean Cell Volume : 90.1 fL  Mean Cell Hemoglobin : 31.0 pg  Mean Cell Hemoglobin Concentration : 34.4 g/dL    03-22    139  |  102  |  17  ----------------------------<  161<H>  4.4   |  26  |  0.9    Ca    8.8      22 Mar 2020 22:20  Mg     2.4     03-22    TPro  6.1  /  Alb  4.4  /  TBili  2.3<H>  /  DBili  x   /  AST  38  /  ALT  35  /  AlkPhos  55  03-22    LIVER FUNCTIONS - ( 22 Mar 2020 22:20 )  Alb: 4.4 g/dL / Pro: 6.1 g/dL / ALK PHOS: 55 U/L / ALT: 35 U/L / AST: 38 U/L / GGT: x           < from: CT Angio Neck w/ IV Cont (03.20.20 @ 11:16) >  IMPRESSION:     1.  No evidence of acute large vessel occlusion.    2.  Mild stenosis of the right MCA proximal M1 segment. Moderate stenosis of the right PCA distal P2 segment.    KOREY JOAQUIN M.D., ATTENDING RADIOLOGIST    < end of copied text >    < from: CT Brain Stroke Protocol (03.20.20 @ 08:23) >  IMPRESSION:     No CT evidence of acute intracranial hemorrhage or large territory infarct. Motion artifact.    Spoke with NIRU BRANCH on 3/20/2020 8:30 AM with readback.    KOREY JOAQUIN M.D., ATTENDING RADIOLOGIST  This document has been electronically signed. Mar 20 2020  8:31AM    < end of copied text >

## 2020-03-23 NOTE — DISCHARGE NOTE NURSING/CASE MANAGEMENT/SOCIAL WORK - PATIENT PORTAL LINK FT
How Severe Is Your Skin Lesion?: mild Has Your Skin Lesion Been Treated?: not been treated Is This A New Presentation, Or A Follow-Up?: Skin Lesion You can access the FollowMyHealth Patient Portal offered by [facility] by registering at the following website: [email or web address]. By joining [employer]’s FollowMyHealth portal, you will also be able to view your health information using other applications (apps) compatible with our system.

## 2020-03-23 NOTE — DISCHARGE NOTE NURSING/CASE MANAGEMENT/SOCIAL WORK - NSDCPEEMAIL_GEN_ALL_CORE
[facility] for Tobacco Control email [email or web address]

## 2020-03-23 NOTE — PROGRESS NOTE ADULT - SUBJECTIVE AND OBJECTIVE BOX
59 y Male with PMH of DM, HTN, CAD, MI 10 years ago, s/p PCI stent x 2, with chest pressure and SOB at night for last 3-4 months, had abnormal stress test, now presents for elective cardiac cath which revealed 2vCAD.     POD 5 CABG x 3 with left radial, EF 57%    Vital Signs Last 24 Hrs  T(C): 36.3 (23 Mar 2020 08:00), Max: 37.8 (22 Mar 2020 12:00)  T(F): 97.3 (23 Mar 2020 08:00), Max: 100.1 (22 Mar 2020 12:00)  HR: 86 (23 Mar 2020 08:00) (81 - 102)  BP: 141/87 (23 Mar 2020 08:00) (109/65 - 155/84)  BP(mean): 108 (23 Mar 2020 08:00) (82 - 110)  RR: 25 (22 Mar 2020 21:00) (19 - 30)  SpO2: 97% (23 Mar 2020 08:00) (94% - 100%)    alert, awake, verbal  follows commands  Clean sternal incision  S1S2 reg rate  b/l air entry, no wheeze  soft abdomen, non distended  moves all ext, power is 5/5 b/l    CXR - b/l congestion, small b/l pleural effusions.  WBC 6.2, Hg 9.7, plt 174  BUN 17, Cr 0.9    a/p:    CAD s/p CABG  HTN  DM  Acute post thoracotomy pain    Lasix 40 mg IV and Zaroxoly x1 today  PO , Plavix 75, Statin 40  PO metop 25 bid, PO imdur daily  Pain control PRD  d/c wires and cordis today  glycemic control  DVT proph

## 2020-03-27 ENCOUNTER — APPOINTMENT (OUTPATIENT)
Dept: CARDIOTHORACIC SURGERY | Facility: CLINIC | Age: 60
End: 2020-03-27
Payer: COMMERCIAL

## 2020-03-27 ENCOUNTER — APPOINTMENT (OUTPATIENT)
Dept: CARDIOLOGY | Facility: CLINIC | Age: 60
End: 2020-03-27

## 2020-03-27 VITALS
HEART RATE: 93 BPM | BODY MASS INDEX: 25.34 KG/M2 | RESPIRATION RATE: 14 BRPM | TEMPERATURE: 97.6 F | OXYGEN SATURATION: 98 % | DIASTOLIC BLOOD PRESSURE: 69 MMHG | SYSTOLIC BLOOD PRESSURE: 117 MMHG | WEIGHT: 177 LBS | HEIGHT: 70 IN

## 2020-03-27 PROCEDURE — 99024 POSTOP FOLLOW-UP VISIT: CPT

## 2020-04-09 RX ORDER — TAMSULOSIN HYDROCHLORIDE 0.4 MG/1
0.4 CAPSULE ORAL AT BEDTIME
Refills: 0 | Status: DISCONTINUED | COMMUNITY
Start: 2020-03-25 | End: 2020-04-09

## 2020-04-09 RX ORDER — POTASSIUM CHLORIDE 1500 MG/1
20 TABLET, EXTENDED RELEASE ORAL
Qty: 3 | Refills: 0 | Status: DISCONTINUED | COMMUNITY
Start: 2020-03-25 | End: 2020-04-09

## 2020-04-10 ENCOUNTER — APPOINTMENT (OUTPATIENT)
Dept: CARDIOLOGY | Facility: CLINIC | Age: 60
End: 2020-04-10
Payer: COMMERCIAL

## 2020-04-10 PROCEDURE — 99214 OFFICE O/P EST MOD 30 MIN: CPT

## 2020-04-17 ENCOUNTER — APPOINTMENT (OUTPATIENT)
Dept: CARDIOTHORACIC SURGERY | Facility: CLINIC | Age: 60
End: 2020-04-17

## 2020-04-20 NOTE — CHART NOTE - NSCHARTNOTEFT_GEN_A_CORE
April 20, 2020  upon review of previous admission simple spirometry, patient was noted to have COPD based on FEV1 of 47%. at that time.

## 2020-04-24 ENCOUNTER — APPOINTMENT (OUTPATIENT)
Dept: CARDIOTHORACIC SURGERY | Facility: CLINIC | Age: 60
End: 2020-04-24
Payer: COMMERCIAL

## 2020-04-24 PROCEDURE — 99024 POSTOP FOLLOW-UP VISIT: CPT

## 2020-04-24 RX ORDER — INSULIN ASPART 100 [IU]/ML
100 INJECTION, SOLUTION INTRAVENOUS; SUBCUTANEOUS 3 TIMES DAILY
Refills: 0 | Status: DISCONTINUED | COMMUNITY
Start: 2020-03-25 | End: 2020-04-24

## 2020-04-24 RX ORDER — FUROSEMIDE 40 MG/1
40 TABLET ORAL
Refills: 0 | Status: DISCONTINUED | COMMUNITY
Start: 2020-03-25 | End: 2020-04-24

## 2020-04-24 RX ORDER — GLUCOSAMINE HCL/CHONDROITIN SU 500-400 MG
3 CAPSULE ORAL AT BEDTIME
Refills: 0 | Status: DISCONTINUED | COMMUNITY
Start: 2020-03-25 | End: 2020-04-24

## 2020-04-24 RX ORDER — OXYCODONE AND ACETAMINOPHEN 5; 325 MG/1; MG/1
5-325 TABLET ORAL
Refills: 0 | Status: DISCONTINUED | COMMUNITY
Start: 2020-03-25 | End: 2020-04-24

## 2020-07-10 ENCOUNTER — APPOINTMENT (OUTPATIENT)
Dept: NEUROLOGY | Facility: CLINIC | Age: 60
End: 2020-07-10
Payer: COMMERCIAL

## 2020-07-10 DIAGNOSIS — R20.0 ANESTHESIA OF SKIN: ICD-10-CM

## 2020-07-10 DIAGNOSIS — R26.81 UNSTEADINESS ON FEET: ICD-10-CM

## 2020-07-10 PROCEDURE — 99214 OFFICE O/P EST MOD 30 MIN: CPT | Mod: 95

## 2020-07-10 RX ORDER — ACETAMINOPHEN 325 MG/1
325 TABLET ORAL
Refills: 0 | Status: DISCONTINUED | COMMUNITY
Start: 2020-03-25 | End: 2020-07-10

## 2020-07-10 NOTE — DISCUSSION/SUMMARY
[FreeTextEntry1] : Mild cognitive changes s/p cardiothoracic surgery with concern for stroke while in patient causing right homonymous field defect.  No stroke was identified on head CT and only moderate narrowing in right PCA seen (opposite side to explain vision change).  Now nothing evident on exam but probably needs a formal visual field test to look for more subtle residual field defect.\par \par The dizziness may in part be related to imbalance from diabetic neuropathy.  Can assess for B12/folate deficience.\par The lingering memory trouble could be from microemboli related to CT surgery.   Hopefully will continue to improve.\par \par THe post sternotomy chest pain he should discuss with CT team and if no additional suggestions option for gabapentin.\par \par Plan:\par 1.  Ophthalmology for formal visual field testing.\par 2.  B12/folate level for memory loss, numbness and imbalance.\par 3.  Continue physical activity on daily basis.\par 4.  F/u with PCP and CT surgery for dizziness and BP checks to make sure metoprolol dose doesn't need adjusting.\par 5.  Return in 3 months for in person visit.\par \par 30 min face-to-face visit with > 50% of time spent in counselling and coordination of care.

## 2020-07-10 NOTE — HISTORY OF PRESENT ILLNESS
[FreeTextEntry1] : Patient consents to audiovisual telehealth visit.   Pt and MD are in [place].  Start of visit is 4:32 p.m.  End of visit 5:02 p.m.  Duration 30 min.\par \par Patient reports no visual problems currently .  Just passed drivers test including vision exam (not visual field test).\par \par Actually family reports he has mild short term memory diffiuclty e.g. what he had for dinner the previous day and some complaints of mild balance difficulty which he describes as dizziness - no room spinning just unsteadiness.  Sometimes vision will dim when stands. BP they say is 120's-130's/ 70's.   He does have diabetes though BG in 110's and has some numbness in his feet so that may be contributing.  Is only on metoprolol as far as BP meds go.\par \par Pt still has a lot of chest pain - feeling of swelling and tightness over sternum .\par States when he gets up feels soreness post sternotomy.  Pain is 2-3/10 in intensity and lasting for about an hour a few times a day.

## 2020-07-10 NOTE — PHYSICAL EXAM
[FreeTextEntry1] : facial muscles symmetric.\par EOM's full.\par Visual fields seem full to confrontation but difficult to do with family member instructed to hold fingers up in front of him.\par no dysarthria\par able to hold hands outstretched w/o drift.\par Ambulates well except for mild unsteadiness on heel to toe walk.\par \par \par

## 2020-10-12 ENCOUNTER — APPOINTMENT (OUTPATIENT)
Dept: NEUROLOGY | Facility: CLINIC | Age: 60
End: 2020-10-12
Payer: COMMERCIAL

## 2020-10-12 VITALS
WEIGHT: 174 LBS | TEMPERATURE: 97.1 F | DIASTOLIC BLOOD PRESSURE: 70 MMHG | HEIGHT: 70 IN | OXYGEN SATURATION: 96 % | HEART RATE: 68 BPM | BODY MASS INDEX: 24.91 KG/M2 | SYSTOLIC BLOOD PRESSURE: 122 MMHG

## 2020-10-12 DIAGNOSIS — R41.3 OTHER AMNESIA: ICD-10-CM

## 2020-10-12 DIAGNOSIS — H53.469 HOMONYMOUS BILATERAL FIELD DEFECTS, UNSPECIFIED SIDE: ICD-10-CM

## 2020-10-12 PROCEDURE — 99214 OFFICE O/P EST MOD 30 MIN: CPT

## 2020-10-12 RX ORDER — FAMOTIDINE 20 MG/1
20 TABLET, FILM COATED ORAL
Qty: 60 | Refills: 0 | Status: DISCONTINUED | COMMUNITY
Start: 2020-03-25 | End: 2020-10-12

## 2020-10-12 RX ORDER — METHOCARBAMOL 500 MG/1
500 TABLET, FILM COATED ORAL TWICE DAILY
Qty: 14 | Refills: 0 | Status: DISCONTINUED | COMMUNITY
Start: 2020-03-31 | End: 2020-10-12

## 2020-10-12 RX ORDER — INSULIN GLARGINE 100 [IU]/ML
100 INJECTION, SOLUTION SUBCUTANEOUS TWICE DAILY
Qty: 1 | Refills: 1 | Status: DISCONTINUED | COMMUNITY
Start: 2020-03-25 | End: 2020-10-12

## 2020-10-12 NOTE — ASSESSMENT
[FreeTextEntry1] : 1.  Pt with cognitive change after 3-v CABG slowly improving.  Does not desire donepezil.  Could have had microemboli, steadinly improving.\par 2.  Left hand numbness s/p CABG, has tinel's at wrist and may have left CTS.  Does not wish to undergo EMG/NCS at this time. Encouraged him to wear wrist splint at night.\par 3.  Still has headaches,  may benefit from magnesium 400 mg/day. Avoid use of OTC's more than a couple of days per week.\par 4.  Seek immediate medical attention for sudden weakness or severe numbness.\par 5.  Return in 6 months.\par

## 2020-10-12 NOTE — PHYSICAL EXAM
[FreeTextEntry1] : EOM's intact\par VF intact to confrontation\par 5/5 strength upper and lower extremities.\par gait - tandem, heel, toe intact.\par \par STM:  3/3\par [id number] 5/5\par \par

## 2020-10-12 NOTE — HISTORY OF PRESENT ILLNESS
[FreeTextEntry1] : Pt presents today in f/u from telehealth visit from 10 July 2020.  He had CT surgery and an episode of visual field loss leading to CT angiogram head and neck.  Some disease of MCA and PCA were identified but no complete stenosis.\par \par He c/o dizziness and blurring of vision.  If sides down for 20 minutes then stands up he will get dizzy (no spinning just unsteadiness) will see dark spots.  No lightheadedness, no spinning.  C/o headaches bifrontal.  + phonophobia and phonophobia.  Denies migraines.\par \par Denies family hx of severe headaches.\par \evonne Has multiple cardiac stents put in.  Now had 3 -v CABG March 2020. \par Feels the surgery affected his memory however just in past 6 weeks is started to remember things better.\par \par Short term memory remains bad.  Eventually the memory comes back.  \par \par Before his surgery had frequent episodes of hyperglycemia, but now monitoring it 3 times a day.\par \par Prior to surgery could only walk 20 steps before stopping and catching his breath, now can walk a mile and a half.\par \par

## 2020-12-08 ENCOUNTER — APPOINTMENT (OUTPATIENT)
Dept: CARDIOLOGY | Facility: CLINIC | Age: 60
End: 2020-12-08

## 2021-03-09 ENCOUNTER — APPOINTMENT (OUTPATIENT)
Dept: CARDIOLOGY | Facility: CLINIC | Age: 61
End: 2021-03-09
Payer: COMMERCIAL

## 2021-03-09 VITALS
SYSTOLIC BLOOD PRESSURE: 110 MMHG | DIASTOLIC BLOOD PRESSURE: 70 MMHG | WEIGHT: 178 LBS | BODY MASS INDEX: 25.48 KG/M2 | HEIGHT: 70 IN

## 2021-03-09 DIAGNOSIS — Z78.9 OTHER SPECIFIED HEALTH STATUS: ICD-10-CM

## 2021-03-09 DIAGNOSIS — Z87.891 PERSONAL HISTORY OF NICOTINE DEPENDENCE: ICD-10-CM

## 2021-03-09 PROCEDURE — 99072 ADDL SUPL MATRL&STAF TM PHE: CPT

## 2021-03-09 PROCEDURE — 99214 OFFICE O/P EST MOD 30 MIN: CPT

## 2021-03-09 PROCEDURE — 93000 ELECTROCARDIOGRAM COMPLETE: CPT

## 2021-03-09 NOTE — PHYSICAL EXAM
[General Appearance - Well Developed] : well developed [Normal Appearance] : normal appearance [Well Groomed] : well groomed [General Appearance - Well Nourished] : well nourished [No Deformities] : no deformities [General Appearance - In No Acute Distress] : no acute distress [Normal Conjunctiva] : the conjunctiva exhibited no abnormalities [Respiration, Rhythm And Depth] : normal respiratory rhythm and effort [Exaggerated Use Of Accessory Muscles For Inspiration] : no accessory muscle use [Auscultation Breath Sounds / Voice Sounds] : lungs were clear to auscultation bilaterally [Heart Rate And Rhythm] : heart rate and rhythm were normal [Heart Sounds] : normal S1 and S2 [Murmurs] : no murmurs present [Arterial Pulses Normal] : the arterial pulses were normal [Edema] : no peripheral edema present [Bowel Sounds] : normal bowel sounds [Abdomen Soft] : soft [Abdomen Tenderness] : non-tender [Cyanosis, Localized] : no localized cyanosis [Skin Color & Pigmentation] : normal skin color and pigmentation [] : no rash [Oriented To Time, Place, And Person] : oriented to person, place, and time

## 2021-03-09 NOTE — ASSESSMENT
[FreeTextEntry1] : CAD, s/p CABG in 2020. No angina.\par Moderate stenosis of right PCA, asymptomatic at this point.\par HTN controlled.\par Hyperlipidemia.\par DM2.\par \par Plan:\par Reduce ASA to 81 mg, d/c Plavix.\par Continue Metoprolol, Atorvastatin. \par Proceed with dental extraction (no SBE prophylaxis, no restriction on Lidocaine/Epinephrine use).\par Blood work ordered.\par Endo and ophthalmology evaluation recommended.\par F/u in 4 months.\par \par Edward Paez MD\par

## 2021-03-09 NOTE — HISTORY OF PRESENT ILLNESS
[FreeTextEntry1] : 60-yo male with h/o hyperlipidemia, HTN, DM, CAD. Patient initially presented to [facility] with unstable angina, OM1 stented. Restenosis in 2009, DAQUAN placed.\par \par Unstable angina again in February/March 2020. [facility] confirmed in-stent occlusion of OM1 (collateralized), severe LAD/D1 bifurcation disease. S/p CABG at [facility] on March 18, 2020 (LIMA to LAD, SVG to D1, radial artery to OM1). Transient right homonymous hemianopsia prior to discharge, CTA showed moderate stenosis of right PCA. Plavix added by neurology. \par \par No CP or SOB now. No new neurologic symptoms.

## 2021-03-10 LAB
BASOPHILS # BLD AUTO: 0.07 K/UL
BASOPHILS NFR BLD AUTO: 1 %
EOSINOPHIL # BLD AUTO: 0.2 K/UL
EOSINOPHIL NFR BLD AUTO: 3 %
ESTIMATED AVERAGE GLUCOSE: 157 MG/DL
HBA1C MFR BLD HPLC: 7.1 %
HCT VFR BLD CALC: 44.2 %
HGB BLD-MCNC: 14.7 G/DL
IMM GRANULOCYTES NFR BLD AUTO: 0.1 %
LYMPHOCYTES # BLD AUTO: 2.85 K/UL
LYMPHOCYTES NFR BLD AUTO: 42.7 %
MAN DIFF?: NORMAL
MCHC RBC-ENTMCNC: 29.6 PG
MCHC RBC-ENTMCNC: 33.3 G/DL
MCV RBC AUTO: 89.1 FL
MONOCYTES # BLD AUTO: 0.58 K/UL
MONOCYTES NFR BLD AUTO: 8.7 %
NEUTROPHILS # BLD AUTO: 2.96 K/UL
NEUTROPHILS NFR BLD AUTO: 44.5 %
PLATELET # BLD AUTO: 202 K/UL
RBC # BLD: 4.96 M/UL
RBC # FLD: 12.5 %
WBC # FLD AUTO: 6.67 K/UL

## 2021-03-15 LAB
ALBUMIN SERPL ELPH-MCNC: 4.6 G/DL
ALP BLD-CCNC: 75 U/L
ALT SERPL-CCNC: 32 U/L
ANION GAP SERPL CALC-SCNC: 10 MMOL/L
AST SERPL-CCNC: 28 U/L
BILIRUB SERPL-MCNC: 1.8 MG/DL
BUN SERPL-MCNC: 10 MG/DL
CALCIUM SERPL-MCNC: 9.5 MG/DL
CHLORIDE SERPL-SCNC: 104 MMOL/L
CHOLEST SERPL-MCNC: 142 MG/DL
CO2 SERPL-SCNC: 27 MMOL/L
CREAT SERPL-MCNC: 0.9 MG/DL
GLUCOSE SERPL-MCNC: 139 MG/DL
HDLC SERPL-MCNC: 43 MG/DL
LDLC SERPL CALC-MCNC: 90 MG/DL
NONHDLC SERPL-MCNC: 99 MG/DL
POTASSIUM SERPL-SCNC: 4.6 MMOL/L
PROT SERPL-MCNC: 6.7 G/DL
SODIUM SERPL-SCNC: 141 MMOL/L
TRIGL SERPL-MCNC: 104 MG/DL
TSH SERPL-ACNC: 1.69 UIU/ML

## 2021-03-25 ENCOUNTER — APPOINTMENT (OUTPATIENT)
Dept: CARDIOTHORACIC SURGERY | Facility: CLINIC | Age: 61
End: 2021-03-25
Payer: COMMERCIAL

## 2021-03-25 ENCOUNTER — NON-APPOINTMENT (OUTPATIENT)
Age: 61
End: 2021-03-25

## 2021-03-25 VITALS — SYSTOLIC BLOOD PRESSURE: 115 MMHG | DIASTOLIC BLOOD PRESSURE: 70 MMHG

## 2021-03-25 VITALS
HEIGHT: 70 IN | OXYGEN SATURATION: 97 % | TEMPERATURE: 97.4 F | BODY MASS INDEX: 25.05 KG/M2 | RESPIRATION RATE: 16 BRPM | WEIGHT: 175 LBS | HEART RATE: 62 BPM

## 2021-03-25 PROCEDURE — 99072 ADDL SUPL MATRL&STAF TM PHE: CPT

## 2021-03-25 PROCEDURE — 99242 OFF/OP CONSLTJ NEW/EST SF 20: CPT

## 2021-03-25 NOTE — PHYSICAL EXAM
[] : no respiratory distress [Heart Rate And Rhythm] : heart rate was normal and rhythm regular [Oriented To Time, Place, And Person] : oriented to person, place, and time

## 2021-03-25 NOTE — REASON FOR VISIT
[Follow-Up: _____] : a [unfilled] follow-up visit [FreeTextEntry1] : CABG x3 on 3/18/2020, being seen today for a lump on his chest
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
## 2021-04-02 ENCOUNTER — APPOINTMENT (OUTPATIENT)
Dept: CARDIOLOGY | Facility: CLINIC | Age: 61
End: 2021-04-02
Payer: COMMERCIAL

## 2021-04-02 VITALS
HEIGHT: 70 IN | TEMPERATURE: 97.8 F | BODY MASS INDEX: 25.48 KG/M2 | HEART RATE: 70 BPM | SYSTOLIC BLOOD PRESSURE: 110 MMHG | DIASTOLIC BLOOD PRESSURE: 72 MMHG | WEIGHT: 178 LBS

## 2021-04-02 DIAGNOSIS — Z00.00 ENCOUNTER FOR GENERAL ADULT MEDICAL EXAMINATION W/OUT ABNORMAL FINDINGS: ICD-10-CM

## 2021-04-02 PROCEDURE — 99213 OFFICE O/P EST LOW 20 MIN: CPT

## 2021-04-02 PROCEDURE — 93000 ELECTROCARDIOGRAM COMPLETE: CPT

## 2021-04-02 PROCEDURE — 99072 ADDL SUPL MATRL&STAF TM PHE: CPT

## 2021-04-02 RX ORDER — ASPIRIN 325 MG/1
325 TABLET, FILM COATED ORAL DAILY
Refills: 0 | Status: COMPLETED | COMMUNITY
Start: 2020-03-25 | End: 2021-04-02

## 2021-04-02 RX ORDER — CLOPIDOGREL BISULFATE 75 MG/1
75 TABLET, FILM COATED ORAL DAILY
Refills: 0 | Status: COMPLETED | COMMUNITY
Start: 2020-03-25 | End: 2021-04-02

## 2021-04-03 NOTE — HISTORY OF PRESENT ILLNESS
[FreeTextEntry1] : 60-yo male with h/o hyperlipidemia, HTN, DM, CAD. Patient initially presented to [facility] with unstable angina, OM1 stented. Restenosis in 2009, DAQUAN placed.\par \par Unstable angina again in February/March 2020. [facility] confirmed in-stent occlusion of OM1 (collateralized), severe LAD/D1 bifurcation disease. S/p CABG at [facility] on March 18, 2020 (LIMA to LAD, SVG to D1, radial artery to OM1). Transient right homonymous hemianopsia prior to discharge, CTA showed moderate stenosis of right PCA. Plavix added by neurology. \par \par No CP or SOB now. No new neurologic symptoms.\par \par GFR 93\par Hb A1c 7.1\par LDL 90\par HDL 43\par Triglycerides 104\par TSH 1.69.\par

## 2021-04-03 NOTE — ASSESSMENT
[FreeTextEntry1] : CAD, s/p CABG in 2020. No angina.\par Moderate stenosis of right PCA, asymptomatic at this point.\par HTN, hyperlipidemia controlled.\par DM2.\par \par Plan:\par Continue treatment.\par Endo evaluation pending.\par Carotid duplex.\par F/u in 4 months.\par \par Edward Paez MD\par

## 2021-06-01 ENCOUNTER — RX RENEWAL (OUTPATIENT)
Age: 61
End: 2021-06-01

## 2021-07-12 ENCOUNTER — RX RENEWAL (OUTPATIENT)
Age: 61
End: 2021-07-12

## 2021-07-20 ENCOUNTER — APPOINTMENT (OUTPATIENT)
Dept: CARDIOLOGY | Facility: CLINIC | Age: 61
End: 2021-07-20

## 2021-07-20 ENCOUNTER — APPOINTMENT (OUTPATIENT)
Dept: CARDIOLOGY | Facility: CLINIC | Age: 61
End: 2021-07-20
Payer: COMMERCIAL

## 2021-07-20 ENCOUNTER — RESULT CHARGE (OUTPATIENT)
Age: 61
End: 2021-07-20

## 2021-07-20 VITALS
BODY MASS INDEX: 25.48 KG/M2 | WEIGHT: 178 LBS | DIASTOLIC BLOOD PRESSURE: 70 MMHG | HEIGHT: 70 IN | SYSTOLIC BLOOD PRESSURE: 116 MMHG

## 2021-07-20 PROCEDURE — 99072 ADDL SUPL MATRL&STAF TM PHE: CPT

## 2021-07-20 PROCEDURE — 93000 ELECTROCARDIOGRAM COMPLETE: CPT

## 2021-07-20 PROCEDURE — 99213 OFFICE O/P EST LOW 20 MIN: CPT

## 2021-07-20 RX ORDER — METFORMIN HYDROCHLORIDE 1000 MG/1
1000 TABLET, COATED ORAL
Qty: 180 | Refills: 0 | Status: DISCONTINUED | COMMUNITY
Start: 2021-05-04

## 2021-07-20 RX ORDER — METFORMIN HYDROCHLORIDE 1000 MG/1
1000 TABLET, COATED ORAL
Refills: 0 | Status: ACTIVE | COMMUNITY

## 2021-07-20 NOTE — ASSESSMENT
[FreeTextEntry1] : CAD, s/p CABG in 2020. No angina.\par Moderate stenosis of right PCA, asymptomatic at this point.\par HTN, hyperlipidemia controlled.\par DM2.\par Severe muscle cramps.\par \par Plan:\par Continue treatment.\par Hold Atorvastatin for 2 weeks, let me know if the cramps improve.\par Endo f/u pending.\par Carotid duplex.\par Fasting blood work ordered.\par F/u in 3 months.\par \par Edward Paez MD\par

## 2021-07-20 NOTE — HISTORY OF PRESENT ILLNESS
[FreeTextEntry1] : 61-yo male with h/o hyperlipidemia, HTN, DM, CAD. Patient initially presented to [facility] with unstable angina, OM1 stented. Restenosis in 2009, DAQUAN placed.\par \par Unstable angina again in February/March 2020. [facility] confirmed in-stent occlusion of OM1 (collateralized), severe LAD/D1 bifurcation disease. S/p CABG at [facility] on March 18, 2020 (LIMA to LAD, SVG to D1, radial artery to OM1). Transient right homonymous hemianopsia prior to discharge, CTA showed moderate stenosis of right PCA. Plavix added by neurology. \par \par No CP or SOB now. No new neurologic symptoms. Severe hand and leg cramps recently.\par \par GFR 93\par Hb A1c 7.1\par LDL 90\par HDL 43\par Triglycerides 104\par TSH 1.69.\par

## 2021-07-20 NOTE — REVIEW OF SYSTEMS
[Muscle Cramps] : muscle cramps [Rash] : no rash [Anxiety] : no anxiety [Easy Bleeding] : no tendency for easy bleeding [Easy Bruising] : no tendency for easy bruising [Negative] : Neurological

## 2021-07-20 NOTE — PHYSICAL EXAM
[Well Developed] : well developed [Well Nourished] : well nourished [No Acute Distress] : no acute distress [Normal Conjunctiva] : normal conjunctiva [Normal Venous Pressure] : normal venous pressure [No Carotid Bruit] : no carotid bruit [Normal S1, S2] : normal S1, S2 [No Murmur] : no murmur [No Rub] : no rub [S4] : S4 [Clear Lung Fields] : clear lung fields [No Respiratory Distress] : no respiratory distress  [Good Air Entry] : good air entry [Soft] : abdomen soft [Non Tender] : non-tender [Normal Bowel Sounds] : normal bowel sounds [Normal Gait] : normal gait [No Edema] : no edema [No Cyanosis] : no cyanosis [No Clubbing] : no clubbing [No Varicosities] : no varicosities [No Rash] : no rash [Moves all extremities] : moves all extremities [Normal Speech] : normal speech [Alert and Oriented] : alert and oriented [Normal memory] : normal memory

## 2021-08-13 ENCOUNTER — TRANSCRIPTION ENCOUNTER (OUTPATIENT)
Age: 61
End: 2021-08-13

## 2021-10-05 ENCOUNTER — RESULT CHARGE (OUTPATIENT)
Age: 61
End: 2021-10-05

## 2021-10-05 ENCOUNTER — APPOINTMENT (OUTPATIENT)
Dept: CARDIOLOGY | Facility: CLINIC | Age: 61
End: 2021-10-05
Payer: COMMERCIAL

## 2021-10-05 VITALS
WEIGHT: 180 LBS | TEMPERATURE: 98.2 F | BODY MASS INDEX: 25.77 KG/M2 | HEIGHT: 70 IN | SYSTOLIC BLOOD PRESSURE: 115 MMHG | DIASTOLIC BLOOD PRESSURE: 70 MMHG

## 2021-10-05 DIAGNOSIS — E11.9 TYPE 2 DIABETES MELLITUS W/OUT COMPLICATIONS: ICD-10-CM

## 2021-10-05 LAB
ALBUMIN SERPL ELPH-MCNC: 4.7 G/DL
ALP BLD-CCNC: 63 U/L
ALT SERPL-CCNC: 18 U/L
ANION GAP SERPL CALC-SCNC: 13 MMOL/L
AST SERPL-CCNC: 19 U/L
BASOPHILS # BLD AUTO: 0.07 K/UL
BASOPHILS NFR BLD AUTO: 0.9 %
BILIRUB SERPL-MCNC: 1.5 MG/DL
BUN SERPL-MCNC: 14 MG/DL
CALCIUM SERPL-MCNC: 9.5 MG/DL
CHLORIDE SERPL-SCNC: 104 MMOL/L
CHOLEST SERPL-MCNC: 155 MG/DL
CO2 SERPL-SCNC: 24 MMOL/L
CREAT SERPL-MCNC: 0.9 MG/DL
EOSINOPHIL # BLD AUTO: 0.22 K/UL
EOSINOPHIL NFR BLD AUTO: 2.7 %
ESTIMATED AVERAGE GLUCOSE: 146 MG/DL
GLUCOSE SERPL-MCNC: 132 MG/DL
HBA1C MFR BLD HPLC: 6.7 %
HCT VFR BLD CALC: 44.6 %
HDLC SERPL-MCNC: 46 MG/DL
HGB BLD-MCNC: 14.2 G/DL
IMM GRANULOCYTES NFR BLD AUTO: 0.2 %
LDLC SERPL CALC-MCNC: 91 MG/DL
LYMPHOCYTES # BLD AUTO: 2.85 K/UL
LYMPHOCYTES NFR BLD AUTO: 35.1 %
MAN DIFF?: NORMAL
MCHC RBC-ENTMCNC: 28.7 PG
MCHC RBC-ENTMCNC: 31.8 G/DL
MCV RBC AUTO: 90.1 FL
MONOCYTES # BLD AUTO: 0.69 K/UL
MONOCYTES NFR BLD AUTO: 8.5 %
NEUTROPHILS # BLD AUTO: 4.27 K/UL
NEUTROPHILS NFR BLD AUTO: 52.6 %
NONHDLC SERPL-MCNC: 109 MG/DL
PLATELET # BLD AUTO: 205 K/UL
POTASSIUM SERPL-SCNC: 4.5 MMOL/L
PROT SERPL-MCNC: 6.9 G/DL
RBC # BLD: 4.95 M/UL
RBC # FLD: 12.8 %
SODIUM SERPL-SCNC: 141 MMOL/L
TRIGL SERPL-MCNC: 116 MG/DL
TSH SERPL-ACNC: 1.65 UIU/ML
WBC # FLD AUTO: 8.12 K/UL

## 2021-10-05 PROCEDURE — 99214 OFFICE O/P EST MOD 30 MIN: CPT

## 2021-10-05 PROCEDURE — 93000 ELECTROCARDIOGRAM COMPLETE: CPT

## 2021-10-05 RX ORDER — DAPAGLIFLOZIN 5 MG/1
5 TABLET, FILM COATED ORAL
Qty: 90 | Refills: 0 | Status: DISCONTINUED | COMMUNITY
Start: 2021-05-05 | End: 2021-10-05

## 2021-10-05 RX ORDER — METFORMIN HYDROCHLORIDE 500 MG/1
500 TABLET, COATED ORAL
Qty: 180 | Refills: 0 | Status: DISCONTINUED | COMMUNITY
Start: 2020-03-25 | End: 2021-10-05

## 2021-10-05 NOTE — REVIEW OF SYSTEMS
[Muscle Cramps] : muscle cramps [Negative] : Gastrointestinal [Rash] : no rash [Dizziness] : no dizziness [Numbness (Hypoesthesia)] : numbness [Weakness] : no weakness [Limb Weakness (Paresis)] : no limb weakness (Paresis) [Anxiety] : no anxiety [Easy Bleeding] : no tendency for easy bleeding [Easy Bruising] : no tendency for easy bruising

## 2021-10-05 NOTE — ASSESSMENT
[FreeTextEntry1] : CAD, s/p CABG in 2020. No angina.\par Moderate stenosis of right PCA, asymptomatic at this point.\par HTN, hyperlipidemia controlled.\par DM2 controlled.\par Severe muscle cramps. Improvement after stopping Atorvastatin.\par \par Plan:\par Continue treatment.\par Replace Atorvastatin with Rosuvastatin 20 mg daily.\par Endo f/u pending.\par Carotid duplex.\par Fasting blood work ordered.\par F/u in 4 months.\par \par Edward Paez MD\par

## 2021-10-05 NOTE — HISTORY OF PRESENT ILLNESS
[FreeTextEntry1] : 61-yo male with h/o hyperlipidemia, HTN, DM, CAD. Patient initially presented to [facility] with unstable angina, OM1 stented. Restenosis in 2009, DAQUAN placed.\par \par Unstable angina again in February/March 2020. C confirmed in-stent occlusion of OM1 (collateralized), severe LAD/D1 bifurcation disease. S/p CABG at [facility] on March 18, 2020 (LIMA to LAD, SVG to D1, radial artery to OM1). Transient right homonymous hemianopsia prior to discharge, CTA showed moderate stenosis of right PCA. Plavix added by neurology. \par \par No CP or SOB now. Severe hand and leg cramps recently, left hand numbness.\par \par \par

## 2021-11-09 ENCOUNTER — RX RENEWAL (OUTPATIENT)
Age: 61
End: 2021-11-09

## 2022-01-21 ENCOUNTER — EMERGENCY (EMERGENCY)
Facility: HOSPITAL | Age: 62
LOS: 0 days | Discharge: HOME | End: 2022-01-22
Attending: EMERGENCY MEDICINE | Admitting: EMERGENCY MEDICINE
Payer: COMMERCIAL

## 2022-01-21 VITALS
HEIGHT: 70 IN | HEART RATE: 82 BPM | RESPIRATION RATE: 18 BRPM | OXYGEN SATURATION: 99 % | TEMPERATURE: 98 F | SYSTOLIC BLOOD PRESSURE: 195 MMHG | DIASTOLIC BLOOD PRESSURE: 94 MMHG

## 2022-01-21 DIAGNOSIS — R07.9 CHEST PAIN, UNSPECIFIED: ICD-10-CM

## 2022-01-21 DIAGNOSIS — Z20.822 CONTACT WITH AND (SUSPECTED) EXPOSURE TO COVID-19: ICD-10-CM

## 2022-01-21 DIAGNOSIS — Z87.891 PERSONAL HISTORY OF NICOTINE DEPENDENCE: ICD-10-CM

## 2022-01-21 DIAGNOSIS — E11.9 TYPE 2 DIABETES MELLITUS WITHOUT COMPLICATIONS: ICD-10-CM

## 2022-01-21 DIAGNOSIS — I25.10 ATHEROSCLEROTIC HEART DISEASE OF NATIVE CORONARY ARTERY WITHOUT ANGINA PECTORIS: ICD-10-CM

## 2022-01-21 DIAGNOSIS — M25.561 PAIN IN RIGHT KNEE: ICD-10-CM

## 2022-01-21 DIAGNOSIS — Z79.02 LONG TERM (CURRENT) USE OF ANTITHROMBOTICS/ANTIPLATELETS: ICD-10-CM
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
## 2022-01-21 DIAGNOSIS — Z95.1 PRESENCE OF AORTOCORONARY BYPASS GRAFT: ICD-10-CM

## 2022-01-21 DIAGNOSIS — Z98.890 OTHER SPECIFIED POSTPROCEDURAL STATES: Chronic | ICD-10-CM

## 2022-01-21 DIAGNOSIS — Z79.4 LONG TERM (CURRENT) USE OF INSULIN: ICD-10-CM
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
## 2022-01-21 DIAGNOSIS — S60.812A ABRASION OF LEFT WRIST, INITIAL ENCOUNTER: ICD-10-CM
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
## 2022-01-21 DIAGNOSIS — S80.212A ABRASION, LEFT KNEE, INITIAL ENCOUNTER: ICD-10-CM

## 2022-01-21 DIAGNOSIS — Z79.82 LONG TERM (CURRENT) USE OF ASPIRIN: ICD-10-CM

## 2022-01-21 DIAGNOSIS — S80.211A ABRASION, RIGHT KNEE, INITIAL ENCOUNTER: ICD-10-CM

## 2022-01-21 DIAGNOSIS — I10 ESSENTIAL (PRIMARY) HYPERTENSION: ICD-10-CM
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
## 2022-01-21 DIAGNOSIS — Z90.89 ACQUIRED ABSENCE OF OTHER ORGANS: Chronic | ICD-10-CM

## 2022-01-21 DIAGNOSIS — Z95.5 PRESENCE OF CORONARY ANGIOPLASTY IMPLANT AND GRAFT: ICD-10-CM

## 2022-01-21 DIAGNOSIS — Z23 ENCOUNTER FOR IMMUNIZATION: ICD-10-CM

## 2022-01-21 DIAGNOSIS — R07.89 OTHER CHEST PAIN: ICD-10-CM

## 2022-01-21 LAB
ALBUMIN SERPL ELPH-MCNC: 4.7 G/DL — SIGNIFICANT CHANGE UP (ref 3.5–5.2)
ALP SERPL-CCNC: 70 U/L — SIGNIFICANT CHANGE UP (ref 30–115)
ALT FLD-CCNC: 26 U/L — SIGNIFICANT CHANGE UP (ref 0–41)
ANION GAP SERPL CALC-SCNC: 18 MMOL/L — HIGH (ref 7–14)
APPEARANCE UR: CLEAR — SIGNIFICANT CHANGE UP
APTT BLD: 32.5 SEC — SIGNIFICANT CHANGE UP (ref 27–39.2)
AST SERPL-CCNC: 29 U/L — SIGNIFICANT CHANGE UP (ref 0–41)
BACTERIA # UR AUTO: NEGATIVE — SIGNIFICANT CHANGE UP
BASOPHILS # BLD AUTO: 0.06 K/UL — SIGNIFICANT CHANGE UP (ref 0–0.2)
BASOPHILS NFR BLD AUTO: 0.8 % — SIGNIFICANT CHANGE UP (ref 0–1)
BILIRUB SERPL-MCNC: 0.9 MG/DL — SIGNIFICANT CHANGE UP (ref 0.2–1.2)
BILIRUB UR-MCNC: NEGATIVE — SIGNIFICANT CHANGE UP
BLD GP AB SCN SERPL QL: SIGNIFICANT CHANGE UP
BUN SERPL-MCNC: 17 MG/DL — SIGNIFICANT CHANGE UP (ref 10–20)
CALCIUM SERPL-MCNC: 9.7 MG/DL — SIGNIFICANT CHANGE UP (ref 8.5–10.1)
CHLORIDE SERPL-SCNC: 104 MMOL/L — SIGNIFICANT CHANGE UP (ref 98–110)
CO2 SERPL-SCNC: 16 MMOL/L — LOW (ref 17–32)
COLOR SPEC: SIGNIFICANT CHANGE UP
CREAT SERPL-MCNC: 0.8 MG/DL — SIGNIFICANT CHANGE UP (ref 0.7–1.5)
DIFF PNL FLD: NEGATIVE — SIGNIFICANT CHANGE UP
EOSINOPHIL # BLD AUTO: 0.21 K/UL — SIGNIFICANT CHANGE UP (ref 0–0.7)
EOSINOPHIL NFR BLD AUTO: 2.8 % — SIGNIFICANT CHANGE UP (ref 0–8)
EPI CELLS # UR: 0 /HPF — SIGNIFICANT CHANGE UP (ref 0–5)
ETHANOL SERPL-MCNC: <10 MG/DL — SIGNIFICANT CHANGE UP
GLUCOSE SERPL-MCNC: 167 MG/DL — HIGH (ref 70–99)
GLUCOSE UR QL: NEGATIVE — SIGNIFICANT CHANGE UP
HCT VFR BLD CALC: 42.8 % — SIGNIFICANT CHANGE UP (ref 42–52)
HGB BLD-MCNC: 14.1 G/DL — SIGNIFICANT CHANGE UP (ref 14–18)
HYALINE CASTS # UR AUTO: 0 /LPF — SIGNIFICANT CHANGE UP (ref 0–7)
IMM GRANULOCYTES NFR BLD AUTO: 0.4 % — HIGH (ref 0.1–0.3)
INR BLD: 0.92 RATIO — SIGNIFICANT CHANGE UP (ref 0.65–1.3)
KETONES UR-MCNC: NEGATIVE — SIGNIFICANT CHANGE UP
LACTATE SERPL-SCNC: 1.8 MMOL/L — SIGNIFICANT CHANGE UP (ref 0.7–2)
LEUKOCYTE ESTERASE UR-ACNC: NEGATIVE — SIGNIFICANT CHANGE UP
LIDOCAIN IGE QN: 43 U/L — SIGNIFICANT CHANGE UP (ref 7–60)
LYMPHOCYTES # BLD AUTO: 3.52 K/UL — HIGH (ref 1.2–3.4)
LYMPHOCYTES # BLD AUTO: 46.2 % — SIGNIFICANT CHANGE UP (ref 20.5–51.1)
MCHC RBC-ENTMCNC: 29 PG — SIGNIFICANT CHANGE UP (ref 27–31)
MCHC RBC-ENTMCNC: 32.9 G/DL — SIGNIFICANT CHANGE UP (ref 32–37)
MCV RBC AUTO: 87.9 FL — SIGNIFICANT CHANGE UP (ref 80–94)
MONOCYTES # BLD AUTO: 0.73 K/UL — HIGH (ref 0.1–0.6)
MONOCYTES NFR BLD AUTO: 9.6 % — HIGH (ref 1.7–9.3)
NEUTROPHILS # BLD AUTO: 3.07 K/UL — SIGNIFICANT CHANGE UP (ref 1.4–6.5)
NEUTROPHILS NFR BLD AUTO: 40.2 % — LOW (ref 42.2–75.2)
NITRITE UR-MCNC: NEGATIVE — SIGNIFICANT CHANGE UP
NRBC # BLD: 0 /100 WBCS — SIGNIFICANT CHANGE UP (ref 0–0)
PH UR: 6.5 — SIGNIFICANT CHANGE UP (ref 5–8)
PLATELET # BLD AUTO: 194 K/UL — SIGNIFICANT CHANGE UP (ref 130–400)
POTASSIUM SERPL-MCNC: 4.8 MMOL/L — SIGNIFICANT CHANGE UP (ref 3.5–5)
POTASSIUM SERPL-SCNC: 4.8 MMOL/L — SIGNIFICANT CHANGE UP (ref 3.5–5)
PROT SERPL-MCNC: 6.8 G/DL — SIGNIFICANT CHANGE UP (ref 6–8)
PROT UR-MCNC: ABNORMAL
PROTHROM AB SERPL-ACNC: 10.6 SEC — SIGNIFICANT CHANGE UP (ref 9.95–12.87)
RBC # BLD: 4.87 M/UL — SIGNIFICANT CHANGE UP (ref 4.7–6.1)
RBC # FLD: 12.7 % — SIGNIFICANT CHANGE UP (ref 11.5–14.5)
RBC CASTS # UR COMP ASSIST: 1 /HPF — SIGNIFICANT CHANGE UP (ref 0–4)
SARS-COV-2 RNA SPEC QL NAA+PROBE: SIGNIFICANT CHANGE UP
SODIUM SERPL-SCNC: 138 MMOL/L — SIGNIFICANT CHANGE UP (ref 135–146)
SP GR SPEC: 1.04 — HIGH (ref 1.01–1.03)
UROBILINOGEN FLD QL: SIGNIFICANT CHANGE UP
WBC # BLD: 7.62 K/UL — SIGNIFICANT CHANGE UP (ref 4.8–10.8)
WBC # FLD AUTO: 7.62 K/UL — SIGNIFICANT CHANGE UP (ref 4.8–10.8)
WBC UR QL: 1 /HPF — SIGNIFICANT CHANGE UP (ref 0–5)

## 2022-01-21 PROCEDURE — 72170 X-RAY EXAM OF PELVIS: CPT | Mod: 26

## 2022-01-21 PROCEDURE — 71045 X-RAY EXAM CHEST 1 VIEW: CPT | Mod: 26

## 2022-01-21 PROCEDURE — 93308 TTE F-UP OR LMTD: CPT | Mod: 26

## 2022-01-21 PROCEDURE — 70450 CT HEAD/BRAIN W/O DYE: CPT | Mod: 26,MA

## 2022-01-21 PROCEDURE — 73552 X-RAY EXAM OF FEMUR 2/>: CPT | Mod: 26,50

## 2022-01-21 PROCEDURE — 72125 CT NECK SPINE W/O DYE: CPT | Mod: 26,MA

## 2022-01-21 PROCEDURE — 74177 CT ABD & PELVIS W/CONTRAST: CPT | Mod: 26,MA

## 2022-01-21 PROCEDURE — 99285 EMERGENCY DEPT VISIT HI MDM: CPT | Mod: 25

## 2022-01-21 PROCEDURE — 76705 ECHO EXAM OF ABDOMEN: CPT | Mod: 26

## 2022-01-21 PROCEDURE — 93010 ELECTROCARDIOGRAM REPORT: CPT

## 2022-01-21 PROCEDURE — 73590 X-RAY EXAM OF LOWER LEG: CPT | Mod: 26,50

## 2022-01-21 PROCEDURE — 73564 X-RAY EXAM KNEE 4 OR MORE: CPT | Mod: 26,50

## 2022-01-21 PROCEDURE — 71260 CT THORAX DX C+: CPT | Mod: 26,MA

## 2022-01-21 PROCEDURE — 73610 X-RAY EXAM OF ANKLE: CPT | Mod: 26,50

## 2022-01-21 PROCEDURE — 99283 EMERGENCY DEPT VISIT LOW MDM: CPT

## 2022-01-21 RX ORDER — SODIUM CHLORIDE 9 MG/ML
250 INJECTION INTRAMUSCULAR; INTRAVENOUS; SUBCUTANEOUS ONCE
Refills: 0 | Status: COMPLETED | OUTPATIENT
Start: 2022-01-21 | End: 2022-01-21

## 2022-01-21 RX ORDER — MORPHINE SULFATE 50 MG/1
6 CAPSULE, EXTENDED RELEASE ORAL ONCE
Refills: 0 | Status: DISCONTINUED | OUTPATIENT
Start: 2022-01-21 | End: 2022-01-21

## 2022-01-21 RX ORDER — TETANUS AND DIPHTHERIA TOXOIDS ADSORBED 2; 2 [LF]/.5ML; [LF]/.5ML
0.5 INJECTION INTRAMUSCULAR ONCE
Refills: 0 | Status: COMPLETED | OUTPATIENT
Start: 2022-01-21 | End: 2022-01-21

## 2022-01-21 RX ADMIN — SODIUM CHLORIDE 250 MILLILITER(S): 9 INJECTION INTRAMUSCULAR; INTRAVENOUS; SUBCUTANEOUS at 19:09

## 2022-01-21 RX ADMIN — MORPHINE SULFATE 6 MILLIGRAM(S): 50 CAPSULE, EXTENDED RELEASE ORAL at 19:15

## 2022-01-21 RX ADMIN — TETANUS AND DIPHTHERIA TOXOIDS ADSORBED 0.5 MILLILITER(S): 2; 2 INJECTION INTRAMUSCULAR at 19:13

## 2022-01-21 RX ADMIN — MORPHINE SULFATE 6 MILLIGRAM(S): 50 CAPSULE, EXTENDED RELEASE ORAL at 19:58
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
## 2022-01-21 NOTE — ED ADULT NURSE NOTE - NSFALLRSKHARMRISK_ED_ALL_ED
Last OV with MXAINE polk on 11-22-17. FU scheduled 1-24-18. This patient was seen in the Hospital.    Patient called the office today requesting a refill on her Reglan 5 mg Qid.  Jacob serrato
no
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
## 2022-01-21 NOTE — ED PROVIDER NOTE - NSICDXPASTMEDICALHX_GEN_ALL_CORE_FT
PAST MEDICAL HISTORY:  CAD (coronary artery disease) s/p PTCA WITH 2 STENTS    DM2 (diabetes mellitus, type 2)     H/O hyperlipidemia     H/O: HTN (hypertension)

## 2022-01-21 NOTE — ED PROVIDER NOTE - NS ED ROS FT
Constitutional: No fatigue, confusion, or amnesia.  Head: No headache  ENT: No visual changes.  Cardiac:  No chest pain or SOB.  Respiratory:  No respiratory distress or hemoptysis.  GI:  No nausea, vomiting, or abdominal pain.  :  No incontinence.  MS:  No joint pain or back pain.  Neuro:  No dizziness, LOC, paralysis, or N/T.  Skin:  No lacerations or abrasions. Constitutional: No fatigue, +confusion, no amnesia.  Head: No headache  ENT: No visual changes.  Cardiac:  No chest pain or SOB.  Respiratory:  No respiratory distress or hemoptysis.  GI:  No nausea, vomiting, or abdominal pain.  :  No incontinence.  MS:  +LT lateral chest wall pain, + RT knee pain  Neuro:  No dizziness, LOC, paralysis, or N/T.  Skin:  No lacerations or abrasions.

## 2022-01-21 NOTE — ED PROVIDER NOTE - PHYSICAL EXAMINATION
CONSTITUTIONAL: WDWN. NAD. Speaking in full sentences, moving all extremities.  SKIN: No lacerations or abrasions.  HEAD: NCAT  EYES: PERRLA, EOMI  ENT: TMs WNL. No hemotympanum noted.  NECK: No posterior midline cervical tenderness  CARD: +S1, S2 no murmurs, gallops, or rubs. Regular rate and rhythm. Radial, DP, PT 2+/4 bilaterally  RESP: LCTAB. No wheezes, rales or rhonchi.  ABD: Abdomen soft, nontender, nondistended.  NEURO: Alert, oriented, grossly unremarkable. Strength 5/5 in bilateral UE and LEs. CN 2-12 grossly intact. Follows commands.  MSK: No TTP in UE and LEs. +L chest wall tenderness, no subq emphysema, +abrasions over b/l knees and L lateral wrist   BACK: No posterior midline tenderness  PSYCH: Cooperative, appropriate. CONSTITUTIONAL: WDWN. NAD. Speaking in full sentences, moving all extremities.  SKIN: No lacerations or abrasions.  HEAD: NCAT  EYES: PERRLA, EOMI  ENT: TMs WNL. No hemotympanum noted.  NECK: No posterior midline cervical tenderness  CARD: +S1, S2 no murmurs, gallops, or rubs. Regular rate and rhythm. Radial, DP, PT 2+/4 bilaterally  RESP: LCTAB. No wheezes, rales or rhonchi.  ABD: Abdomen soft, nontender, nondistended.  NEURO: Alert, oriented to year but not month or day. Strength 5/5 in bilateral UE and LEs. CN 2-12 grossly intact. Follows commands.  MSK: No TTP in UE and LEs. +L chest wall tenderness, no subq emphysema, +abrasions over b/l knees and L lateral wrist   BACK: No posterior midline tenderness  PSYCH: Cooperative, appropriate.

## 2022-01-22 VITALS
RESPIRATION RATE: 18 BRPM | TEMPERATURE: 98 F | OXYGEN SATURATION: 98 % | SYSTOLIC BLOOD PRESSURE: 124 MMHG | DIASTOLIC BLOOD PRESSURE: 63 MMHG | HEART RATE: 69 BPM

## 2022-01-22 PROCEDURE — 99236 HOSP IP/OBS SAME DATE HI 85: CPT | Mod: 25

## 2022-01-22 PROCEDURE — 70553 MRI BRAIN STEM W/O & W/DYE: CPT | Mod: 26,MA

## 2022-01-22 PROCEDURE — 95816 EEG AWAKE AND DROWSY: CPT | Mod: 26

## 2022-01-22 PROCEDURE — 99283 EMERGENCY DEPT VISIT LOW MDM: CPT

## 2022-01-22 RX ORDER — TIZANIDINE 4 MG/1
1 TABLET ORAL
Qty: 15 | Refills: 0
Start: 2022-01-22 | End: 2022-01-26

## 2022-01-22 RX ORDER — ACETAMINOPHEN 500 MG
650 TABLET ORAL ONCE
Refills: 0 | Status: COMPLETED | OUTPATIENT
Start: 2022-01-22 | End: 2022-01-22

## 2022-01-22 RX ORDER — METOPROLOL TARTRATE 50 MG
25 TABLET ORAL ONCE
Refills: 0 | Status: COMPLETED | OUTPATIENT
Start: 2022-01-22 | End: 2022-01-22

## 2022-01-22 RX ORDER — ACETAMINOPHEN 500 MG
975 TABLET ORAL ONCE
Refills: 0 | Status: COMPLETED | OUTPATIENT
Start: 2022-01-22 | End: 2022-01-22

## 2022-01-22 RX ORDER — IBUPROFEN 200 MG
600 TABLET ORAL ONCE
Refills: 0 | Status: COMPLETED | OUTPATIENT
Start: 2022-01-22 | End: 2022-01-22

## 2022-01-22 RX ORDER — ASPIRIN/CALCIUM CARB/MAGNESIUM 324 MG
81 TABLET ORAL ONCE
Refills: 0 | Status: COMPLETED | OUTPATIENT
Start: 2022-01-22 | End: 2022-01-22

## 2022-01-22 RX ORDER — METFORMIN HYDROCHLORIDE 850 MG/1
1000 TABLET ORAL ONCE
Refills: 0 | Status: DISCONTINUED | OUTPATIENT
Start: 2022-01-22 | End: 2022-01-22

## 2022-01-22 RX ADMIN — Medication 975 MILLIGRAM(S): at 01:14

## 2022-01-22 RX ADMIN — Medication 600 MILLIGRAM(S): at 03:15

## 2022-01-22 RX ADMIN — Medication 25 MILLIGRAM(S): at 10:34

## 2022-01-22 RX ADMIN — Medication 81 MILLIGRAM(S): at 10:34
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
## 2022-01-22 NOTE — ED CDU PROVIDER INITIAL DAY NOTE - NS ED ROS FT
Constitutional: No fatigue, +confusion, no amnesia.  Head: No headache  ENT: No visual changes.  Cardiac:  No chest pain or SOB.  Respiratory:  No respiratory distress or hemoptysis.  GI:  No nausea, vomiting, or abdominal pain.  :  No incontinence.  MS:  +LT lateral chest wall pain, + RT knee pain  Neuro:  No dizziness, LOC, paralysis, or N/T.  Skin:  No lacerations or abrasions.

## 2022-01-22 NOTE — ED CDU PROVIDER DISPOSITION NOTE - PATIENT PORTAL LINK FT
You can access the FollowMyHealth Patient Portal offered by [facility] by registering at the following website: [email or web address]. By joining [email or web address]’s FollowMyHealth portal, you will also be able to view your health information using other applications (apps) compatible with our system.

## 2022-01-22 NOTE — ED CDU PROVIDER DISPOSITION NOTE - NSFOLLOWUPINSTRUCTIONS_ED_ALL_ED_FT
return if symptoms persist or gets worse  follow up with neurology/primary care doctor /cardiologist

## 2022-01-22 NOTE — ED CDU PROVIDER DISPOSITION NOTE - CARE PROVIDER_API CALL
Nikhil Ann)  Neurology  [address], [address]  [place] [address]  Phone: [phone]  Fax: [phone]  Follow Up Time: 4-6 Days    Isaías Miller)  Cardiovascular Disease; Internal Medicine; Interventional Cardiology  [address]  [place] [address]  Phone: [phone]  Fax: [phone]  Follow Up Time: 4-6 Days
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
## 2022-01-22 NOTE — ED CDU PROVIDER INITIAL DAY NOTE - MEDICAL DECISION MAKING DETAILS
Patient placed in's for MRI of the brain following abnormal CT done as a part of her trauma work-up.  Evaluated by neurology MRI was done and negative for acute pathology patient was cleared for discharge home outpatient follow-up with neurology was advised.
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
## 2022-01-22 NOTE — ED CDU PROVIDER INITIAL DAY NOTE - PHYSICAL EXAMINATION
CONSTITUTIONAL: WDWN. NAD. Speaking in full sentences, moving all extremities.  SKIN: No lacerations or abrasions.  HEAD: NCAT  EYES: PERRLA, EOMI  ENT: TMs WNL. No hemotympanum noted.  NECK: No posterior midline cervical tenderness  CARD: +S1, S2 no murmurs, gallops, or rubs. Regular rate and rhythm. Radial, DP, PT 2+/4 bilaterally  RESP: LCTAB. No wheezes, rales or rhonchi.  ABD: Abdomen soft, nontender, nondistended.  NEURO: Alert, oriented to year but not month or day. Strength 5/5 in bilateral UE and LEs. CN 2-12 grossly intact. Follows commands.  MSK: No TTP in UE and LEs. +L chest wall tenderness, no subq emphysema, +abrasions over b/l knees and L lateral wrist   BACK: No posterior midline tenderness  PSYCH: Cooperative, appropriate.

## 2022-01-22 NOTE — ED CDU PROVIDER INITIAL DAY NOTE - NS ED MD PROGRESS NOTE ADD

## 2022-01-22 NOTE — ED CDU PROVIDER DISPOSITION NOTE - PROVIDER TOKENS
PROVIDER:[TOKEN:[[id number]],FOLLOWUP:[4-6 Days]],PROVIDER:[TOKEN:[[id number]],FOLLOWUP:[4-6 Days]]
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
## 2022-01-22 NOTE — ED CDU PROVIDER DISPOSITION NOTE - CARE PROVIDERS DIRECT ADDRESSES
[email or web address]
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
## 2022-02-02 ENCOUNTER — TRANSCRIPTION ENCOUNTER (OUTPATIENT)
Age: 62
End: 2022-02-02

## 2022-02-07 NOTE — ED PROVIDER NOTE - INTERPRETATION
Anticoagulation Clinic Progress Note    Anticoagulation Summary  As of 2022    INR goal:  2.0-3.0   TTR:  78.2 % (1.3 y)   INR used for dosin.3 (2022)   Warfarin maintenance plan:  2 mg every Mon, Wed, Fri; 4 mg all other days   Weekly warfarin total:  22 mg   No change documented:  Sea Chisholm, [facility]   Plan last modified:  Ken Craven, PharmD (2021)   Next INR check:  3/7/2022   Target end date:      Indications    H/O mechanical aortic valve replacement [Z95.2]             Anticoagulation Episode Summary     INR check location:      Preferred lab:      Send INR reminders to:   [facility]INICAL POOL    Comments:        Anticoagulation Care Providers     Provider Role Specialty Phone number    Errol Mantilla MD Referring Cardiology [phone]          Clinic Interview:  Patient Findings     Positives:  Change in diet/appetite    Negatives:  Signs/symptoms of thrombosis, Signs/symptoms of bleeding,   Laboratory test error suspected, Change in health, Change in alcohol use,   Change in activity, Upcoming invasive procedure, Emergency department   visit, Upcoming dental procedure, Missed doses, Extra doses, Change in   medications, Hospital admission, Bruising, Other complaints    Comments:  Pt reports eating 4-5 salads this week with plans to resume   2-3 per week.      Clinical Outcomes     Negatives:  Major bleeding event, Thromboembolic event,   Anticoagulation-related hospital admission, Anticoagulation-related ED   visit, Anticoagulation-related fatality    Comments:  Pt reports eating 4-5 salads this week with plans to resume   2-3 per week.        INR History:  Anticoagulation Monitoring 2022   INR 2.7 2.2 2.3   INR Date 2022   INR Goal 2.0-3.0 2.0-3.0 2.0-3.0   Trend Same Same Same   Last Week Total 22 mg 22 mg 22 mg   Next Week Total 22 mg 22 mg 22 mg   Sun 4 mg 4 mg 4 mg   Mon 2 mg 2 mg 2 mg   Tue 4 mg 4 mg 4 mg   Wed 2 mg 2 mg  2 mg   Thu 4 mg 4 mg 4 mg   Fri 2 mg 2 mg 2 mg   Sat 4 mg 4 mg 4 mg   Visit Report - - -   Some recent data might be hidden       Plan:  1. INR is Therapeutic today- see above in Anticoagulation Summary.  Will instruct Jackson Alba to Continue their warfarin regimen- see above in Anticoagulation Summary.  2. Follow up in 4 weeks  3. Patient declines warfarin refills.  4. Verbal and written information provided. Patient expresses understanding and has no further questions at this time.    Sea Chisholm, PharmD  Pharmacy Resident  02/07/22 15:38 EST     normal

## 2022-02-15 ENCOUNTER — APPOINTMENT (OUTPATIENT)
Dept: CARDIOLOGY | Facility: CLINIC | Age: 62
End: 2022-02-15

## 2022-03-01 ENCOUNTER — RX RENEWAL (OUTPATIENT)
Age: 62
End: 2022-03-01

## 2022-03-08 NOTE — PHYSICAL THERAPY INITIAL EVALUATION ADULT - ASR WT BEARING STATUS EVAL
Denies tobacco and alcohol use. Smokes marijuana on avg 4-5x/week, 1-2 joints each time. no weight-bearing restrictions

## 2022-04-08 ENCOUNTER — RX RENEWAL (OUTPATIENT)
Age: 62
End: 2022-04-08
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
## 2023-02-27 ENCOUNTER — RX RENEWAL (OUTPATIENT)
Age: 63
End: 2023-02-27

## 2023-04-20 ENCOUNTER — RX RENEWAL (OUTPATIENT)
Age: 63
End: 2023-04-20

## 2023-05-18 LAB
ALBUMIN SERPL ELPH-MCNC: 4.9 G/DL
ALP BLD-CCNC: 72 U/L
ALT SERPL-CCNC: 19 U/L
ANION GAP SERPL CALC-SCNC: 10 MMOL/L
AST SERPL-CCNC: 23 U/L
BASOPHILS # BLD AUTO: 0.08 K/UL
BASOPHILS NFR BLD AUTO: 1.2 %
BILIRUB SERPL-MCNC: 0.9 MG/DL
BUN SERPL-MCNC: 21 MG/DL
CALCIUM SERPL-MCNC: 9.6 MG/DL
CHLORIDE SERPL-SCNC: 103 MMOL/L
CHOLEST SERPL-MCNC: 208 MG/DL
CO2 SERPL-SCNC: 26 MMOL/L
CREAT SERPL-MCNC: 0.9 MG/DL
EGFR: 96 ML/MIN/1.73M2
EOSINOPHIL # BLD AUTO: 0.18 K/UL
EOSINOPHIL NFR BLD AUTO: 2.7 %
ESTIMATED AVERAGE GLUCOSE: 157 MG/DL
GLUCOSE SERPL-MCNC: 111 MG/DL
HBA1C MFR BLD HPLC: 7.1 %
HCT VFR BLD CALC: 41.8 %
HDLC SERPL-MCNC: 41 MG/DL
HGB BLD-MCNC: 13.6 G/DL
IMM GRANULOCYTES NFR BLD AUTO: 0.2 %
LDLC SERPL CALC-MCNC: 148 MG/DL
LYMPHOCYTES # BLD AUTO: 2.46 K/UL
LYMPHOCYTES NFR BLD AUTO: 37.2 %
MAN DIFF?: NORMAL
MCHC RBC-ENTMCNC: 29.9 PG
MCHC RBC-ENTMCNC: 32.5 G/DL
MCV RBC AUTO: 91.9 FL
MONOCYTES # BLD AUTO: 0.55 K/UL
MONOCYTES NFR BLD AUTO: 8.3 %
NEUTROPHILS # BLD AUTO: 3.34 K/UL
NEUTROPHILS NFR BLD AUTO: 50.4 %
NONHDLC SERPL-MCNC: 167 MG/DL
PLATELET # BLD AUTO: 222 K/UL
POTASSIUM SERPL-SCNC: 5 MMOL/L
PROT SERPL-MCNC: 6.9 G/DL
RBC # BLD: 4.55 M/UL
RBC # FLD: 12.8 %
SODIUM SERPL-SCNC: 139 MMOL/L
TRIGL SERPL-MCNC: 97 MG/DL
TSH SERPL-ACNC: 1.93 UIU/ML
WBC # FLD AUTO: 6.62 K/UL

## 2023-05-22 ENCOUNTER — APPOINTMENT (OUTPATIENT)
Dept: CARDIOLOGY | Facility: CLINIC | Age: 63
End: 2023-05-22
Payer: MEDICAID

## 2023-05-22 VITALS
BODY MASS INDEX: 25.05 KG/M2 | WEIGHT: 175 LBS | SYSTOLIC BLOOD PRESSURE: 120 MMHG | DIASTOLIC BLOOD PRESSURE: 74 MMHG | HEART RATE: 65 BPM | HEIGHT: 70 IN

## 2023-05-22 DIAGNOSIS — I25.10 ATHEROSCLEROTIC HEART DISEASE OF NATIVE CORONARY ARTERY W/OUT ANGINA PECTORIS: ICD-10-CM

## 2023-05-22 DIAGNOSIS — E78.5 HYPERLIPIDEMIA, UNSPECIFIED: ICD-10-CM

## 2023-05-22 PROCEDURE — 99214 OFFICE O/P EST MOD 30 MIN: CPT | Mod: 25

## 2023-05-22 PROCEDURE — 93000 ELECTROCARDIOGRAM COMPLETE: CPT

## 2023-05-22 RX ORDER — ASPIRIN 81 MG
81 TABLET, DELAYED RELEASE (ENTERIC COATED) ORAL DAILY
Refills: 0 | Status: DISCONTINUED | COMMUNITY
End: 2023-05-22

## 2023-05-22 RX ORDER — ATORVASTATIN CALCIUM 40 MG/1
40 TABLET, FILM COATED ORAL
Qty: 90 | Refills: 1 | Status: DISCONTINUED | COMMUNITY
Start: 2020-03-25 | End: 2023-05-22

## 2023-05-22 NOTE — REVIEW OF SYSTEMS
[Muscle Cramps] : muscle cramps [Numbness (Hypoesthesia)] : numbness [Negative] : Gastrointestinal [Chest Discomfort] : chest discomfort [Rash] : no rash [Dizziness] : no dizziness [Weakness] : no weakness [Limb Weakness (Paresis)] : no limb weakness (Paresis) [Anxiety] : no anxiety [Easy Bleeding] : no tendency for easy bleeding [Easy Bruising] : no tendency for easy bruising

## 2023-05-22 NOTE — HISTORY OF PRESENT ILLNESS
[FreeTextEntry1] : 61-yo male with h/o hyperlipidemia, HTN, DM, CAD. Patient initially presented to [facility] with unstable angina, OM1 stented. Restenosis in 2009, DAQUAN placed.\par \par Unstable angina again in February/March 2020. LHC confirmed in-stent occlusion of OM1 (collateralized), severe LAD/D1 bifurcation disease. S/p CABG at [facility] on March 18, 2020 (LIMA to LAD, SVG to D1, radial artery to OM1). Transient right homonymous hemianopsia prior to discharge, CTA showed moderate stenosis of right PCA. \par \par 05/22/2023: Patient complains of chest pain on the left side which worsens with point pressure and with cold weather. Denies SOB, dizziness. Patient was off Rosuvastatin for 2 months.\par \par \par

## 2023-05-22 NOTE — ASSESSMENT
[FreeTextEntry1] : CAD, s/p CABG in 2020. \par Moderate stenosis of right PCA, asymptomatic at this point.\par HTN controlled.\par DM2 uncontrolled.\par Hyperlipidemia uncontrolled due to non-compliance with Rosuvastatin.\par Episodes of left-sided chest pain.\par \par Plan:\par Continue treatment.\par Resume Rosuvastatin 20 mg daily.\par F/u with PCP regarding treatment for DM.\par Fasting blood work ordered, repeat after 2 month of statin\par Exercise stress echocardiogram.\par \par Edward Paez MD\par

## 2023-05-22 NOTE — PHYSICAL EXAM
[Well Nourished] : well nourished [Well Developed] : well developed [No Acute Distress] : no acute distress [Normal Conjunctiva] : normal conjunctiva [Normal Venous Pressure] : normal venous pressure [No Carotid Bruit] : no carotid bruit [Normal S1, S2] : normal S1, S2 [No Murmur] : no murmur [No Rub] : no rub [S4] : S4 [Clear Lung Fields] : clear lung fields [Good Air Entry] : good air entry [No Respiratory Distress] : no respiratory distress  [Soft] : abdomen soft [Non Tender] : non-tender [Normal Bowel Sounds] : normal bowel sounds [Normal Gait] : normal gait [No Edema] : no edema [No Cyanosis] : no cyanosis [No Clubbing] : no clubbing [No Varicosities] : no varicosities [No Rash] : no rash [Moves all extremities] : moves all extremities [Normal Speech] : normal speech [Alert and Oriented] : alert and oriented [Normal memory] : normal memory

## 2023-05-23 ENCOUNTER — RESULT CHARGE (OUTPATIENT)
Age: 63
End: 2023-05-23

## 2023-07-19 ENCOUNTER — APPOINTMENT (OUTPATIENT)
Dept: CARDIOLOGY | Facility: CLINIC | Age: 63
End: 2023-07-19
Payer: MEDICAID

## 2023-07-19 DIAGNOSIS — Z95.1 PRESENCE OF AORTOCORONARY BYPASS GRAFT: ICD-10-CM

## 2023-07-19 DIAGNOSIS — I25.9 CHRONIC ISCHEMIC HEART DISEASE, UNSPECIFIED: ICD-10-CM

## 2023-07-19 DIAGNOSIS — R07.89 OTHER CHEST PAIN: ICD-10-CM

## 2023-07-19 DIAGNOSIS — I10 ESSENTIAL (PRIMARY) HYPERTENSION: ICD-10-CM

## 2023-07-19 PROCEDURE — 93325 DOPPLER ECHO COLOR FLOW MAPG: CPT

## 2023-07-19 PROCEDURE — 93351 STRESS TTE COMPLETE: CPT

## 2023-07-19 PROCEDURE — 93320 DOPPLER ECHO COMPLETE: CPT

## 2023-07-28 PROBLEM — I10 HTN (HYPERTENSION), BENIGN: Status: ACTIVE | Noted: 2021-04-19

## 2023-07-28 PROBLEM — I25.9 CHRONIC ISCHEMIC HEART DISEASE: Status: ACTIVE | Noted: 2023-05-22

## 2023-07-28 PROBLEM — Z95.1 S/P CABG X 3: Status: ACTIVE | Noted: 2020-03-27

## 2023-07-28 PROBLEM — R07.89 ATYPICAL CHEST PAIN: Status: ACTIVE | Noted: 2023-05-22

## 2023-08-18 ENCOUNTER — EMERGENCY (EMERGENCY)
Facility: HOSPITAL | Age: 63
LOS: 0 days | Discharge: HOME | End: 2023-08-18
Attending: EMERGENCY MEDICINE
Payer: MEDICAID

## 2023-08-18 VITALS
OXYGEN SATURATION: 97 % | RESPIRATION RATE: 20 BRPM | DIASTOLIC BLOOD PRESSURE: 89 MMHG | WEIGHT: 175.93 LBS | SYSTOLIC BLOOD PRESSURE: 177 MMHG | TEMPERATURE: 98 F | HEART RATE: 79 BPM
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
## 2023-08-18 DIAGNOSIS — I25.10 ATHEROSCLEROTIC HEART DISEASE OF NATIVE CORONARY ARTERY WITHOUT ANGINA PECTORIS: ICD-10-CM

## 2023-08-18 DIAGNOSIS — Z79.02 LONG TERM (CURRENT) USE OF ANTITHROMBOTICS/ANTIPLATELETS: ICD-10-CM

## 2023-08-18 DIAGNOSIS — Z90.89 ACQUIRED ABSENCE OF OTHER ORGANS: Chronic | ICD-10-CM

## 2023-08-18 DIAGNOSIS — S01.412A LACERATION WITHOUT FOREIGN BODY OF LEFT CHEEK AND TEMPOROMANDIBULAR AREA, INITIAL ENCOUNTER: ICD-10-CM

## 2023-08-18 DIAGNOSIS — Z79.4 LONG TERM (CURRENT) USE OF INSULIN: ICD-10-CM

## 2023-08-18 DIAGNOSIS — Z98.890 OTHER SPECIFIED POSTPROCEDURAL STATES: Chronic | ICD-10-CM

## 2023-08-18 DIAGNOSIS — E78.5 HYPERLIPIDEMIA, UNSPECIFIED: ICD-10-CM

## 2023-08-18 DIAGNOSIS — Z79.84 LONG TERM (CURRENT) USE OF ORAL HYPOGLYCEMIC DRUGS: ICD-10-CM
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
## 2023-08-18 DIAGNOSIS — E11.9 TYPE 2 DIABETES MELLITUS WITHOUT COMPLICATIONS: ICD-10-CM

## 2023-08-18 DIAGNOSIS — I10 ESSENTIAL (PRIMARY) HYPERTENSION: ICD-10-CM

## 2023-08-18 PROCEDURE — 73140 X-RAY EXAM OF FINGER(S): CPT | Mod: LT

## 2023-08-18 PROCEDURE — 70486 CT MAXILLOFACIAL W/O DYE: CPT | Mod: 26,MA

## 2023-08-18 PROCEDURE — 12011 RPR F/E/E/N/L/M 2.5 CM/<: CPT

## 2023-08-18 PROCEDURE — 73140 X-RAY EXAM OF FINGER(S): CPT | Mod: 26,LT

## 2023-08-18 PROCEDURE — 70486 CT MAXILLOFACIAL W/O DYE: CPT | Mod: MA

## 2023-08-18 PROCEDURE — 99284 EMERGENCY DEPT VISIT MOD MDM: CPT | Mod: 25

## 2023-08-18 PROCEDURE — 99284 EMERGENCY DEPT VISIT MOD MDM: CPT

## 2023-08-18 RX ORDER — IBUPROFEN 200 MG
800 TABLET ORAL ONCE
Refills: 0 | Status: COMPLETED | OUTPATIENT
Start: 2023-08-18 | End: 2023-08-18

## 2023-08-18 RX ADMIN — Medication 800 MILLIGRAM(S): at 19:46

## 2023-08-18 NOTE — ED PROVIDER NOTE - NSFOLLOWUPINSTRUCTIONS_ED_ALL_ED_FT
Facial Laceration    A facial laceration is a cut (laceration) on the face. You can get a facial laceration from any accident or injury that cuts or tears the skin or tissues on your face. Facial lacerations can bleed and be painful. You may need medical attention to stop the bleeding, help the wound heal, lower your risk for infection, and prevent scarring. Lacerations usually heal quickly after treatment.  What are the causes?  Facial lacerations are often caused by:   A motor vehicle accident.  A sports injury.  A violent attack.  A fall.  What are the signs or symptoms?  Common symptoms of this condition include:   An obvious cut on the face.  Bleeding.  Pain.  Swelling.  Bruising.  A change in the appearance of the face (deformity).  How is this diagnosed?  Your health care provider can diagnose a facial laceration by doing a physical exam and asking how the injury happened. Your provider will also check for areas of bleeding, tissue damage, nerve injury, and a foreign body in your wound.  How is this treated?  Treatment for a facial laceration depends on how severe and deep the wound is. It also depends on the risk for infection. First, your health care provider will clean the wound to prevent infection. Then, your health care provider will decide whether to close the wound. This depends on how deep the laceration is and how long ago your injury happened. If there is an increased risk of infection, the wound will not be closed.   If your wound needs to be closed:   Your health care provider will use stitches (sutures), skin glue (skin adhesive), or skin adhesive strips to repair the laceration.  Your health care provider may first numb the area around your wound by injecting a numbing medicine (local anesthetic) in and around your laceration before doing the sutures.  Torn skin edges or dead skin may be removed.  If sutures are used, the laceration may be closed in layers. Absorbable sutures will be used for deep tissues and muscle. Removable sutures will be used to close the skin.  You may be given:   Pain medicine.  A tetanus shot.  Oral antibiotic medicines.  Antibiotic ointment.  Follow these instructions at home:  Wound care   Follow your health care provider’s instructions for wound care. These instructions will vary depending on how the wound was closed.  For sutures:   Keep the wound clean and dry.  If you were given a bandage (dressing), change it at least once a day, or as told by your health care provider. Also change the dressing if it gets wet or dirty.  Wash the wound with soap and water two times a day, or as told by your health care provider. Rinse off the soap with water. Pat the wound dry with a clean towel.  After cleaning, apply a thin layer of antibiotic ointment as told by your health care provider. This helps prevent infection and keeps the dressing from sticking to the wound.  You may shower as usual after the first 24 hours. Do not soak the wound until the sutures are removed.  Return to have you sutures removed as told by your health care provider.  Do not wear makeup until your health care provider has approved.  For skin adhesive:   You may briefly wet your wound in the shower or bath.  Do not soak or scrub the wound.  Do not swim.  Do not sweat heavily until the skin adhesive has fallen off on its own.  After showering or bathing, gently pat the wound dry with a clean towel.  Do not apply liquid medicine, cream medicine, ointment, or makeup to your wound while the skin adhesive is in place. This may loosen the film before your wound is healed.  If you have a dressing over your wound, be careful not to apply tape directly over the skin adhesive. This may pull off the adhesive before the wound is healed.  Do not spend a long time in the sun or use a tanning lamp while the skin adhesive is in place.  The skin adhesive will usually remain in place for 5–10 days and then naturally fall off the skin. Do not pick at the adhesive film.  For skin adhesive strips:   Keep the wound clean and dry.  Do not let the skin adhesive strips get wet.  Bathe carefully to keep the wound and adhesive strips dry. If the wound gets wet, pat it dry with a clean towel right away.  Skin adhesive strips fall off on their own over time. You may trim the strips as the wound heals. Do not remove skin adhesive strips that are still stuck to the wound.  General instructions      Check your wound area every day for signs of infection. Check for:   Redness, swelling, or pain.  Fluid or blood.  Warmth.  Pus or a bad smell.  Take over-the-counter and prescription medicines only as told by your health care provider.  If you were prescribed an antibiotic, take or apply it as told by your health care provider. Do not stop using the antibiotic even if your condition improves.  After the laceration has healed:   Know that it can take a year or two for redness or scarring to fade.  Apply sunscreen to the skin of your healed wound to minimize scarring. Ultraviolet (UV) rays can darken scar tissue.  Contact a health care provider if:  You have a fever.  You have redness, swelling, or pain around your wound.  You have fluid or blood coming from your wound.  Your wound feels warm to the touch.  You have pus or a bad smell coming from your wound.  Get help right away if:  You have a red streak going away from your wound.  Summary  You may need treatment for a facial laceration to prevent infection, stop bleeding, help healing, and prevent scarring.  A deep laceration may be closed with stitches (sutures).  Follow your health care provider's wound care instructions carefully.  This information is not intended to replace advice given to you by your health care provider. Make sure you discuss any questions you have with your health care provider.      Facial or Scalp Contusion       A facial or scalp contusion is a deep bruise (contusion) on the face or head. Injuries to the face and head generally cause a lot of swelling, especially around the eyes. Contusions are the result of an injury that caused bleeding under the skin. The contusion may turn blue, purple, or yellow. Minor injuries will give you a painless contusion, but more severe contusions may stay painful and swollen for a few weeks.      What are the causes?    A facial or scalp contusion is caused by a blunt injury, fall, or trauma to the face or head area.      What are the signs or symptoms?  Symptoms of this condition include:  •Swelling of the injured area.      •Discoloration of the injured area.      •Tenderness, soreness, or pain in the injured area.        How is this diagnosed?    This condition is diagnosed based on your medical history and a physical exam. An X-ray exam, CT scan, or MRI may be needed to check for any additional injuries, such as broken bones (fractures).      How is this treated?    Often, the best treatment for a facial or scalp contusion is applying cold compresses to the injured area. Over-the-counter medicines may also be recommended for pain control.      Follow these instructions at home:    •Take over-the-counter and prescription medicines only as told by your health care provider.    •If directed, apply ice to the injured area.  •Put ice in a plastic bag.      •Place a towel between your skin and the bag.      •Leave the ice on for 20 minutes, 2–3 times a day.        •Keep all follow-up visits as told by your health care provider. This is important.        Contact a health care provider if:    •You have trouble biting or chewing.      •Your pain or swelling gets worse.      •You have pain when you move your eyes.        Get help right away if:    •You have severe pain or a headache that is not relieved by medicine.      •You have unusual sleepiness, confusion, or personality changes.      •You vomit.      •You have a nosebleed that does not stop.      •You have double vision or blurred vision.      •You have a continuous clear fluid draining from your nose or ear.      •You have trouble walking or using your arms or legs.      •You have severe dizziness.        Summary    •A facial or scalp contusion is a deep bruise (contusion) on the face or head.      •Contusions are the result of an injury that caused bleeding under the skin.      •Minor injuries will give you a painless contusion, but more severe contusions may stay painful and swollen for a few weeks.      •Often, the best treatment for a facial or scalp contusion is applying cold compresses to the injured area.      This information is not intended to replace advice given to you by your health care provider. Make sure you discuss any questions you have with your health care provider.      Document Revised: 11/30/2018 Document Reviewed: 11/07/2017    [employer] Patient Education © 2020 [employer]

## 2023-08-18 NOTE — ED PROVIDER NOTE - PHYSICAL EXAMINATION
Constitutional: Well developed, well nourished, no acute distress  Head: Normocephalic, hematoma to left maxilla, periorbital swelling, tenderness over frontal and maxillary sinuses   Eyes: PERRLA, EOMI, conjunctiva and sclera WNL, mild ecchymosis and hematoma surrounding left eye, no raccoon eyes   ENT: Moist mucous membranes, no rhinorrhea,   Neck: Supple, Nontender, normal thyroid, no cervical tenderness with full rom  Neurologic: AAO x 4; Normal motor and sensory function  Psychiatric: Appropriate affect, normal mood
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
## 2023-10-23 ENCOUNTER — RX RENEWAL (OUTPATIENT)
Age: 63
End: 2023-10-23

## 2023-10-23 RX ORDER — METOPROLOL TARTRATE 25 MG/1
25 TABLET, FILM COATED ORAL
Qty: 60 | Refills: 2 | Status: ACTIVE | COMMUNITY
Start: 2020-03-25 | End: 1900-01-01

## 2023-11-13 ENCOUNTER — RX RENEWAL (OUTPATIENT)
Age: 63
End: 2023-11-13

## 2023-11-13 RX ORDER — ROSUVASTATIN CALCIUM 20 MG/1
20 TABLET, FILM COATED ORAL
Qty: 90 | Refills: 1 | Status: ACTIVE | COMMUNITY
Start: 2021-10-05 | End: 1900-01-01

## 2024-01-01 NOTE — PROGRESS NOTE ADULT - SUBJECTIVE AND OBJECTIVE BOX
SP CTA for transient confusion and visual disturbance  symptoms currently resolved  based on symptoms resolution and CTA report below it is unlikely that the patient has a CVA      < from: CT Angio Head w/ IV Cont (03.20.20 @ 11:16) >  EXAM:  CT ANGIO BRAIN (W)AW IC        EXAM:  CT ANGIO NECK (W)AW IC            PROCEDURE DATE:  03/20/2020            INTERPRETATION:  Clinical History / Reason for exam: Confusion. Stroke code.    Technique: CT angiogram of the head and neck. CTA of the head and neck was performed following the intravenous administration of 100 cc Optiray 320 (0 cc discarded) with coronal, sagittal and multiple 3-D MIP and volume rendered reformats.    Comparison: None    Findings:     NECK:  The visualized aortic arch and great vessel origins are patent.  The common, internal and external carotid arteries are patent.  The vertebral arteries are patent.    HEAD:  The distal internal carotid arteries are patent. The anterior and middle cerebral arteries arepatent. There is mild stenosis of the right MCA M1 segment.    The distal vertebral arteries are patent.  The basilar artery is patent. There is moderate stenosis of the distal P2 segment of the right posterior cerebral artery.    OTHER:  Mild cervical spine degenerative changes.  Right neck hemodialysis catheter.  Small bilateral pleural effusions with subjacent compressive atelectasis.    IMPRESSION:     1.  No evidence of acute large vessel occlusion.    2.  Mild stenosis of the right MCA proximal M1 segment. Moderate stenosis of the right PCA distal P2 segment.      < end of copied text > 0

## 2024-01-19 NOTE — ED PROVIDER NOTE - PATIENT PORTAL LINK FT
You can access the FollowMyHealth Patient Portal offered by [facility] by registering at the following website: [email or web address]. By joining "[employer]"’s FollowMyHealth portal, you will also be able to view your health information using other applications (apps) compatible with our system. none

## 2024-12-16 ENCOUNTER — APPOINTMENT (OUTPATIENT)
Dept: CARDIOLOGY | Facility: CLINIC | Age: 64
End: 2024-12-16
Payer: MEDICAID

## 2024-12-16 VITALS
HEART RATE: 69 BPM | HEIGHT: 70 IN | BODY MASS INDEX: 25.48 KG/M2 | SYSTOLIC BLOOD PRESSURE: 128 MMHG | WEIGHT: 178 LBS | DIASTOLIC BLOOD PRESSURE: 70 MMHG

## 2024-12-16 DIAGNOSIS — E78.5 HYPERLIPIDEMIA, UNSPECIFIED: ICD-10-CM

## 2024-12-16 DIAGNOSIS — I10 ESSENTIAL (PRIMARY) HYPERTENSION: ICD-10-CM

## 2024-12-16 DIAGNOSIS — Z95.1 PRESENCE OF AORTOCORONARY BYPASS GRAFT: ICD-10-CM

## 2024-12-16 DIAGNOSIS — I25.10 ATHEROSCLEROTIC HEART DISEASE OF NATIVE CORONARY ARTERY W/OUT ANGINA PECTORIS: ICD-10-CM

## 2024-12-16 DIAGNOSIS — E11.9 TYPE 2 DIABETES MELLITUS W/OUT COMPLICATIONS: ICD-10-CM

## 2024-12-16 PROCEDURE — 93000 ELECTROCARDIOGRAM COMPLETE: CPT

## 2024-12-16 PROCEDURE — 99214 OFFICE O/P EST MOD 30 MIN: CPT | Mod: 25

## 2024-12-16 RX ORDER — ASPIRIN 81 MG/1
81 TABLET ORAL DAILY
Refills: 0 | Status: ACTIVE | COMMUNITY

## 2025-06-17 ENCOUNTER — APPOINTMENT (OUTPATIENT)
Dept: CARDIOLOGY | Facility: CLINIC | Age: 65
End: 2025-06-17